# Patient Record
Sex: FEMALE | Race: WHITE | NOT HISPANIC OR LATINO
[De-identification: names, ages, dates, MRNs, and addresses within clinical notes are randomized per-mention and may not be internally consistent; named-entity substitution may affect disease eponyms.]

---

## 2020-05-04 ENCOUNTER — TRANSCRIPTION ENCOUNTER (OUTPATIENT)
Age: 85
End: 2020-05-04

## 2021-01-01 ENCOUNTER — TRANSCRIPTION ENCOUNTER (OUTPATIENT)
Age: 86
End: 2021-01-01

## 2021-01-01 ENCOUNTER — NON-APPOINTMENT (OUTPATIENT)
Age: 86
End: 2021-01-01

## 2021-01-01 ENCOUNTER — APPOINTMENT (OUTPATIENT)
Dept: UROLOGY | Facility: CLINIC | Age: 86
End: 2021-01-01
Payer: MEDICARE

## 2021-01-01 VITALS
BODY MASS INDEX: 23.6 KG/M2 | WEIGHT: 125 LBS | SYSTOLIC BLOOD PRESSURE: 133 MMHG | DIASTOLIC BLOOD PRESSURE: 81 MMHG | RESPIRATION RATE: 13 BRPM | HEIGHT: 61 IN | HEART RATE: 88 BPM

## 2021-01-01 DIAGNOSIS — Z86.79 PERSONAL HISTORY OF OTHER DISEASES OF THE CIRCULATORY SYSTEM: ICD-10-CM

## 2021-01-01 DIAGNOSIS — N32.81 OVERACTIVE BLADDER: ICD-10-CM

## 2021-01-01 LAB
APPEARANCE: ABNORMAL
BACTERIA UR CULT: NORMAL
BACTERIA: ABNORMAL
BILIRUBIN URINE: ABNORMAL
BLOOD URINE: ABNORMAL
COLOR: ABNORMAL
GLUCOSE QUALITATIVE U: NEGATIVE
HYALINE CASTS: 0 /LPF
KETONES URINE: NORMAL
LEUKOCYTE ESTERASE URINE: ABNORMAL
MICROSCOPIC-UA: NORMAL
NITRITE URINE: POSITIVE
PH URINE: 7.5
PROTEIN URINE: ABNORMAL
RED BLOOD CELLS URINE: >720 /HPF
SPECIFIC GRAVITY URINE: 1.02
SQUAMOUS EPITHELIAL CELLS: 0 /HPF
UROBILINOGEN URINE: NORMAL
WHITE BLOOD CELLS URINE: >720 /HPF

## 2021-01-01 PROCEDURE — 99204 OFFICE O/P NEW MOD 45 MIN: CPT

## 2021-01-01 RX ORDER — PROPRANOLOL HYDROCHLORIDE 80 MG/1
TABLET ORAL
Refills: 0 | Status: ACTIVE | COMMUNITY

## 2021-01-01 RX ORDER — OXYBUTYNIN CHLORIDE 5 MG/1
5 TABLET ORAL
Refills: 0 | Status: DISCONTINUED | COMMUNITY

## 2021-01-25 ENCOUNTER — TRANSCRIPTION ENCOUNTER (OUTPATIENT)
Age: 86
End: 2021-01-25

## 2021-02-10 ENCOUNTER — TRANSCRIPTION ENCOUNTER (OUTPATIENT)
Age: 86
End: 2021-02-10

## 2021-12-20 PROBLEM — Z00.00 ENCOUNTER FOR PREVENTIVE HEALTH EXAMINATION: Status: ACTIVE | Noted: 2021-01-01

## 2021-12-22 PROBLEM — N32.81 OVERACTIVE BLADDER: Status: RESOLVED | Noted: 2021-01-01 | Resolved: 2021-01-01

## 2021-12-22 PROBLEM — Z86.79 HISTORY OF HYPERTENSION: Status: RESOLVED | Noted: 2021-01-01 | Resolved: 2021-01-01

## 2021-12-22 NOTE — HISTORY OF PRESENT ILLNESS
[Currently Experiencing ___] :  [unfilled] [Urinary Urgency] : urinary urgency [Urinary Frequency] : urinary frequency [Nocturia] : nocturia [Hematuria - Gross] : gross hematuria [None] : None [FreeTextEntry1] : Ms. Gutierrez is a very pleasant 89 year old woman here today for evaluation of hematuria and possible UTI.\par She reports that for the past 2 years she has had "cloudy urine" and sometimes pink tinged.\par She reports that additionally that she has increased urgency and frequency.\par She also reports nocturnal enuresis if she is not able to stay awake.\par Records show Urgent Care visit for UTIs 5 times this year.\par She denies any dysuria or pain.\par She was given oxybutynin by Dr. Mariano Rojas, urology, with [facility], but reports it was not helpful.\par Denies personal or family history of kidney stones.\par Denies familial history of urological cancers.\par Denies history of smoking.\par \par

## 2021-12-22 NOTE — ASSESSMENT
[FreeTextEntry1] : Ms. Gutierrez is a very pleasant 89 year old woman here today for evaluation of gross hematuria and possible UTIs.\par She reports that for the past 2 years she has had "cloudy urine" and sometimes pink tinged.\par She reports that additionally that she has increased urgency and frequency.\par She also reports nocturnal enuresis if she is not able to stay awake.\par Records show Urgent Care visit for UTIs 5 times this year.\par She denies any dysuria or pain.\par She was given oxybutynin by Dr. Mariano Rojas, urology, with [facility], but reports it was not helpful.\par Denies personal or family history of kidney stones.\par Denies familial history of urological cancers.\par Denies history of smoking.\par Stop Ditropan as this has been ineffective\par Urine culture\par Urinalysis deferred as patient did not give a sufficient sample for urinalysis.  We will check urinalysis at next visit\par Start Bactrim BID for 7 days given concern for urinary tract infection\par -urine cytology deferred due to insufficient sample at this time.  We will check this at her next visit\par -CT Urogram and renal ultrasound.  I recommended that she undergo upper tract imaging with a CT scan.  She does not wish to do this.  I therefore recommended an ultrasound, however she reports that she does not feel that this is necessary.  We discussed the risk of upper tract malignancy with gross hematuria and recurrent urinary tract infections and after thorough discussion of the risks and benefits of both the exam and undiagnosed malignancy and other etiologies of hematuria she wishes to forego all imaging\par -cystoscopy.  Patient wishes to forego this as well despite recommendation to do so and risks and benefits of both exam as well as risk for malignancy\par -We discussed AUA guidelines regarding risk stratification for hematuria\par -Extensive discussion of the potential etiologies of hematuria, as well as the recommendation to complete full work up for evaluation of cancer or other  sources of hematuria, the patient wishes to defer this at this time\par Follow-up in 2 weeks\par \par \par \par \par

## 2022-01-01 ENCOUNTER — APPOINTMENT (OUTPATIENT)
Dept: UROLOGY | Facility: CLINIC | Age: 87
End: 2022-01-01
Payer: MEDICARE

## 2022-01-01 ENCOUNTER — TRANSCRIPTION ENCOUNTER (OUTPATIENT)
Age: 87
End: 2022-01-01

## 2022-01-01 ENCOUNTER — INPATIENT (INPATIENT)
Facility: HOSPITAL | Age: 87
LOS: 13 days | Discharge: NURSING, REHAB OR LONG-TERM CARE | DRG: 481 | End: 2022-02-16
Attending: ORTHOPAEDIC SURGERY | Admitting: ORTHOPAEDIC SURGERY
Payer: MEDICARE

## 2022-01-01 ENCOUNTER — INPATIENT (INPATIENT)
Facility: HOSPITAL | Age: 87
LOS: 1 days | Discharge: HOSPICE | DRG: 682 | End: 2022-03-17
Attending: STUDENT IN AN ORGANIZED HEALTH CARE EDUCATION/TRAINING PROGRAM | Admitting: STUDENT IN AN ORGANIZED HEALTH CARE EDUCATION/TRAINING PROGRAM
Payer: MEDICARE

## 2022-01-01 ENCOUNTER — INPATIENT (INPATIENT)
Facility: HOSPITAL | Age: 87
LOS: 1 days | DRG: 951 | End: 2022-03-19
Attending: FAMILY MEDICINE | Admitting: FAMILY MEDICINE
Payer: OTHER MISCELLANEOUS

## 2022-01-01 ENCOUNTER — NON-APPOINTMENT (OUTPATIENT)
Age: 87
End: 2022-01-01

## 2022-01-01 ENCOUNTER — APPOINTMENT (OUTPATIENT)
Dept: UROLOGY | Facility: CLINIC | Age: 87
End: 2022-01-01

## 2022-01-01 VITALS
OXYGEN SATURATION: 92 % | DIASTOLIC BLOOD PRESSURE: 59 MMHG | TEMPERATURE: 98 F | SYSTOLIC BLOOD PRESSURE: 95 MMHG | RESPIRATION RATE: 15 BRPM | HEART RATE: 102 BPM

## 2022-01-01 VITALS
HEART RATE: 89 BPM | OXYGEN SATURATION: 90 % | HEIGHT: 62 IN | RESPIRATION RATE: 18 BRPM | DIASTOLIC BLOOD PRESSURE: 76 MMHG | SYSTOLIC BLOOD PRESSURE: 116 MMHG | TEMPERATURE: 98 F

## 2022-01-01 VITALS
OXYGEN SATURATION: 95 % | HEART RATE: 89 BPM | SYSTOLIC BLOOD PRESSURE: 128 MMHG | RESPIRATION RATE: 18 BRPM | TEMPERATURE: 99 F | DIASTOLIC BLOOD PRESSURE: 67 MMHG

## 2022-01-01 VITALS
OXYGEN SATURATION: 97 % | HEART RATE: 74 BPM | DIASTOLIC BLOOD PRESSURE: 70 MMHG | RESPIRATION RATE: 17 BRPM | SYSTOLIC BLOOD PRESSURE: 131 MMHG | TEMPERATURE: 98 F

## 2022-01-01 VITALS
TEMPERATURE: 98 F | OXYGEN SATURATION: 100 % | DIASTOLIC BLOOD PRESSURE: 43 MMHG | SYSTOLIC BLOOD PRESSURE: 100 MMHG | RESPIRATION RATE: 18 BRPM | HEART RATE: 89 BPM

## 2022-01-01 VITALS — TEMPERATURE: 92.8 F

## 2022-01-01 VITALS — WEIGHT: 130.29 LBS

## 2022-01-01 DIAGNOSIS — Z02.9 ENCOUNTER FOR ADMINISTRATIVE EXAMINATIONS, UNSPECIFIED: ICD-10-CM

## 2022-01-01 DIAGNOSIS — D72.829 ELEVATED WHITE BLOOD CELL COUNT, UNSPECIFIED: ICD-10-CM

## 2022-01-01 DIAGNOSIS — Z29.9 ENCOUNTER FOR PROPHYLACTIC MEASURES, UNSPECIFIED: ICD-10-CM

## 2022-01-01 DIAGNOSIS — N39.0 URINARY TRACT INFECTION, SITE NOT SPECIFIED: ICD-10-CM

## 2022-01-01 DIAGNOSIS — I10 ESSENTIAL (PRIMARY) HYPERTENSION: ICD-10-CM

## 2022-01-01 DIAGNOSIS — E87.5 HYPERKALEMIA: ICD-10-CM

## 2022-01-01 DIAGNOSIS — K11.7 DISTURBANCES OF SALIVARY SECRETION: ICD-10-CM

## 2022-01-01 DIAGNOSIS — J96.91 RESPIRATORY FAILURE, UNSPECIFIED WITH HYPOXIA: ICD-10-CM

## 2022-01-01 DIAGNOSIS — C50.919 MALIGNANT NEOPLASM OF UNSPECIFIED SITE OF UNSPECIFIED FEMALE BREAST: ICD-10-CM

## 2022-01-01 DIAGNOSIS — R06.00 DYSPNEA, UNSPECIFIED: ICD-10-CM

## 2022-01-01 DIAGNOSIS — N17.9 ACUTE KIDNEY FAILURE, UNSPECIFIED: ICD-10-CM

## 2022-01-01 DIAGNOSIS — Z51.5 ENCOUNTER FOR PALLIATIVE CARE: ICD-10-CM

## 2022-01-01 DIAGNOSIS — R35.0 FREQUENCY OF MICTURITION: ICD-10-CM

## 2022-01-01 DIAGNOSIS — N18.9 CHRONIC KIDNEY DISEASE, UNSPECIFIED: ICD-10-CM

## 2022-01-01 DIAGNOSIS — J18.9 PNEUMONIA, UNSPECIFIED ORGANISM: ICD-10-CM

## 2022-01-01 DIAGNOSIS — R53.81 OTHER MALAISE: ICD-10-CM

## 2022-01-01 DIAGNOSIS — R45.1 RESTLESSNESS AND AGITATION: ICD-10-CM

## 2022-01-01 DIAGNOSIS — S72.009A FRACTURE OF UNSPECIFIED PART OF NECK OF UNSPECIFIED FEMUR, INITIAL ENCOUNTER FOR CLOSED FRACTURE: ICD-10-CM

## 2022-01-01 DIAGNOSIS — R31.9 HEMATURIA, UNSPECIFIED: ICD-10-CM

## 2022-01-01 DIAGNOSIS — N32.89 OTHER SPECIFIED DISORDERS OF BLADDER: ICD-10-CM

## 2022-01-01 DIAGNOSIS — R93.89 ABNORMAL FINDINGS ON DIAGNOSTIC IMAGING OF OTHER SPECIFIED BODY STRUCTURES: ICD-10-CM

## 2022-01-01 DIAGNOSIS — E88.3 TUMOR LYSIS SYNDROME: ICD-10-CM

## 2022-01-01 DIAGNOSIS — R19.00 INTRA-ABDOMINAL AND PELVIC SWELLING, MASS AND LUMP, UNSPECIFIED SITE: ICD-10-CM

## 2022-01-01 DIAGNOSIS — S72.002A FRACTURE OF UNSPECIFIED PART OF NECK OF LEFT FEMUR, INITIAL ENCOUNTER FOR CLOSED FRACTURE: ICD-10-CM

## 2022-01-01 DIAGNOSIS — R31.0 GROSS HEMATURIA: ICD-10-CM

## 2022-01-01 DIAGNOSIS — S42.402A UNSPECIFIED FRACTURE OF LOWER END OF LEFT HUMERUS, INITIAL ENCOUNTER FOR CLOSED FRACTURE: ICD-10-CM

## 2022-01-01 DIAGNOSIS — M25.552 PAIN IN LEFT HIP: ICD-10-CM

## 2022-01-01 DIAGNOSIS — E43 UNSPECIFIED SEVERE PROTEIN-CALORIE MALNUTRITION: ICD-10-CM

## 2022-01-01 DIAGNOSIS — K56.7 ILEUS, UNSPECIFIED: ICD-10-CM

## 2022-01-01 LAB
% ALBUMIN: 51.9 % — SIGNIFICANT CHANGE UP
% ALPHA 1: 10 % — SIGNIFICANT CHANGE UP
% ALPHA 2: 15.2 % — SIGNIFICANT CHANGE UP
% BETA: 11 % — SIGNIFICANT CHANGE UP
% GAMMA: 11.9 % — SIGNIFICANT CHANGE UP
A1C WITH ESTIMATED AVERAGE GLUCOSE RESULT: 5.4 % — SIGNIFICANT CHANGE UP (ref 4–5.6)
ABO RH CONFIRMATION: SIGNIFICANT CHANGE UP
ALBUMIN SERPL ELPH-MCNC: 1.5 G/DL — LOW (ref 3.5–5)
ALBUMIN SERPL ELPH-MCNC: 1.7 G/DL — LOW (ref 3.5–5)
ALBUMIN SERPL ELPH-MCNC: 1.7 G/DL — LOW (ref 3.5–5)
ALBUMIN SERPL ELPH-MCNC: 1.8 G/DL — LOW (ref 3.5–5)
ALBUMIN SERPL ELPH-MCNC: 1.9 G/DL — LOW (ref 3.5–5)
ALBUMIN SERPL ELPH-MCNC: 2.1 G/DL — LOW (ref 3.5–5)
ALBUMIN SERPL ELPH-MCNC: 2.1 G/DL — LOW (ref 3.5–5)
ALBUMIN SERPL ELPH-MCNC: 2.3 G/DL — LOW (ref 3.6–5.5)
ALBUMIN SERPL ELPH-MCNC: 2.6 G/DL — LOW (ref 3.5–5)
ALBUMIN/GLOB SERPL ELPH: 1 RATIO — SIGNIFICANT CHANGE UP
ALP SERPL-CCNC: 103 U/L — SIGNIFICANT CHANGE UP (ref 40–120)
ALP SERPL-CCNC: 127 U/L — HIGH (ref 40–120)
ALP SERPL-CCNC: 129 U/L — HIGH (ref 40–120)
ALP SERPL-CCNC: 136 U/L — HIGH (ref 40–120)
ALP SERPL-CCNC: 58 U/L — SIGNIFICANT CHANGE UP (ref 40–120)
ALP SERPL-CCNC: 58 U/L — SIGNIFICANT CHANGE UP (ref 40–120)
ALP SERPL-CCNC: 60 U/L — SIGNIFICANT CHANGE UP (ref 40–120)
ALP SERPL-CCNC: 63 U/L — SIGNIFICANT CHANGE UP (ref 40–120)
ALP SERPL-CCNC: 65 U/L — SIGNIFICANT CHANGE UP (ref 40–120)
ALP SERPL-CCNC: 65 U/L — SIGNIFICANT CHANGE UP (ref 40–120)
ALP SERPL-CCNC: 78 U/L — SIGNIFICANT CHANGE UP (ref 40–120)
ALP SERPL-CCNC: 79 U/L — SIGNIFICANT CHANGE UP (ref 40–120)
ALP SERPL-CCNC: 82 U/L — SIGNIFICANT CHANGE UP (ref 40–120)
ALP SERPL-CCNC: 93 U/L — SIGNIFICANT CHANGE UP (ref 40–120)
ALPHA1 GLOB SERPL ELPH-MCNC: 0.4 G/DL — SIGNIFICANT CHANGE UP (ref 0.1–0.4)
ALPHA2 GLOB SERPL ELPH-MCNC: 0.7 G/DL — SIGNIFICANT CHANGE UP (ref 0.5–1)
ALT FLD-CCNC: 10 U/L DA — SIGNIFICANT CHANGE UP (ref 10–60)
ALT FLD-CCNC: 10 U/L DA — SIGNIFICANT CHANGE UP (ref 10–60)
ALT FLD-CCNC: 11 U/L DA — SIGNIFICANT CHANGE UP (ref 10–60)
ALT FLD-CCNC: 11 U/L DA — SIGNIFICANT CHANGE UP (ref 10–60)
ALT FLD-CCNC: 12 U/L DA — SIGNIFICANT CHANGE UP (ref 10–60)
ALT FLD-CCNC: 30 U/L DA — SIGNIFICANT CHANGE UP (ref 10–60)
ALT FLD-CCNC: 31 U/L DA — SIGNIFICANT CHANGE UP (ref 10–60)
ALT FLD-CCNC: 33 U/L DA — SIGNIFICANT CHANGE UP (ref 10–60)
ALT FLD-CCNC: 36 U/L DA — SIGNIFICANT CHANGE UP (ref 10–60)
ALT FLD-CCNC: 36 U/L DA — SIGNIFICANT CHANGE UP (ref 10–60)
ALT FLD-CCNC: 40 U/L DA — SIGNIFICANT CHANGE UP (ref 10–60)
ALT FLD-CCNC: 8 U/L DA — LOW (ref 10–60)
ALT FLD-CCNC: 9 U/L DA — LOW (ref 10–60)
ALT FLD-CCNC: 9 U/L DA — LOW (ref 10–60)
ANION GAP SERPL CALC-SCNC: 1 MMOL/L — LOW (ref 5–17)
ANION GAP SERPL CALC-SCNC: 11 MMOL/L — SIGNIFICANT CHANGE UP (ref 5–17)
ANION GAP SERPL CALC-SCNC: 12 MMOL/L — SIGNIFICANT CHANGE UP (ref 5–17)
ANION GAP SERPL CALC-SCNC: 13 MMOL/L — SIGNIFICANT CHANGE UP (ref 5–17)
ANION GAP SERPL CALC-SCNC: 13 MMOL/L — SIGNIFICANT CHANGE UP (ref 5–17)
ANION GAP SERPL CALC-SCNC: 15 MMOL/L — SIGNIFICANT CHANGE UP (ref 5–17)
ANION GAP SERPL CALC-SCNC: 5 MMOL/L — SIGNIFICANT CHANGE UP (ref 5–17)
ANION GAP SERPL CALC-SCNC: 5 MMOL/L — SIGNIFICANT CHANGE UP (ref 5–17)
ANION GAP SERPL CALC-SCNC: 6 MMOL/L — SIGNIFICANT CHANGE UP (ref 5–17)
ANION GAP SERPL CALC-SCNC: 7 MMOL/L — SIGNIFICANT CHANGE UP (ref 5–17)
ANISOCYTOSIS BLD QL: SLIGHT — SIGNIFICANT CHANGE UP
APPEARANCE UR: ABNORMAL
APPEARANCE: ABNORMAL
APTT BLD: 30.8 SEC — SIGNIFICANT CHANGE UP (ref 27.5–35.5)
APTT BLD: 31 SEC — SIGNIFICANT CHANGE UP (ref 27.5–35.5)
APTT BLD: 31 SEC — SIGNIFICANT CHANGE UP (ref 27.5–35.5)
AST SERPL-CCNC: 10 U/L — SIGNIFICANT CHANGE UP (ref 10–40)
AST SERPL-CCNC: 10 U/L — SIGNIFICANT CHANGE UP (ref 10–40)
AST SERPL-CCNC: 12 U/L — SIGNIFICANT CHANGE UP (ref 10–40)
AST SERPL-CCNC: 15 U/L — SIGNIFICANT CHANGE UP (ref 10–40)
AST SERPL-CCNC: 18 U/L — SIGNIFICANT CHANGE UP (ref 10–40)
AST SERPL-CCNC: 22 U/L — SIGNIFICANT CHANGE UP (ref 10–40)
AST SERPL-CCNC: 23 U/L — SIGNIFICANT CHANGE UP (ref 10–40)
AST SERPL-CCNC: 36 U/L — SIGNIFICANT CHANGE UP (ref 10–40)
AST SERPL-CCNC: 37 U/L — SIGNIFICANT CHANGE UP (ref 10–40)
AST SERPL-CCNC: 43 U/L — HIGH (ref 10–40)
AST SERPL-CCNC: 60 U/L — HIGH (ref 10–40)
AST SERPL-CCNC: 68 U/L — HIGH (ref 10–40)
B-GLOBULIN SERPL ELPH-MCNC: 0.5 G/DL — SIGNIFICANT CHANGE UP (ref 0.5–1)
BACTERIA # UR AUTO: ABNORMAL /HPF
BACTERIA # UR AUTO: ABNORMAL /HPF
BACTERIA # UR AUTO: NEGATIVE /HPF — SIGNIFICANT CHANGE UP
BACTERIA UR CULT: ABNORMAL
BACTERIA: ABNORMAL
BASOPHILS # BLD AUTO: 0 K/UL — SIGNIFICANT CHANGE UP (ref 0–0.2)
BASOPHILS # BLD AUTO: 0 K/UL — SIGNIFICANT CHANGE UP (ref 0–0.2)
BASOPHILS # BLD AUTO: 0.01 K/UL — SIGNIFICANT CHANGE UP (ref 0–0.2)
BASOPHILS # BLD AUTO: 0.06 K/UL — SIGNIFICANT CHANGE UP (ref 0–0.2)
BASOPHILS # BLD AUTO: 0.06 K/UL — SIGNIFICANT CHANGE UP (ref 0–0.2)
BASOPHILS # BLD AUTO: 0.1 K/UL — SIGNIFICANT CHANGE UP (ref 0–0.2)
BASOPHILS NFR BLD AUTO: 0 % — SIGNIFICANT CHANGE UP (ref 0–2)
BASOPHILS NFR BLD AUTO: 0.3 % — SIGNIFICANT CHANGE UP (ref 0–2)
BILIRUB DIRECT SERPL-MCNC: 0.1 MG/DL — SIGNIFICANT CHANGE UP (ref 0–0.3)
BILIRUB INDIRECT FLD-MCNC: 0.3 MG/DL — SIGNIFICANT CHANGE UP (ref 0.2–1)
BILIRUB SERPL-MCNC: 0.4 MG/DL — SIGNIFICANT CHANGE UP (ref 0.2–1.2)
BILIRUB SERPL-MCNC: 0.5 MG/DL — SIGNIFICANT CHANGE UP (ref 0.2–1.2)
BILIRUB SERPL-MCNC: 0.6 MG/DL — SIGNIFICANT CHANGE UP (ref 0.2–1.2)
BILIRUB SERPL-MCNC: 0.6 MG/DL — SIGNIFICANT CHANGE UP (ref 0.2–1.2)
BILIRUB SERPL-MCNC: 0.7 MG/DL — SIGNIFICANT CHANGE UP (ref 0.2–1.2)
BILIRUB SERPL-MCNC: 0.7 MG/DL — SIGNIFICANT CHANGE UP (ref 0.2–1.2)
BILIRUB SERPL-MCNC: 0.8 MG/DL — SIGNIFICANT CHANGE UP (ref 0.2–1.2)
BILIRUB UR-MCNC: NEGATIVE — SIGNIFICANT CHANGE UP
BILIRUBIN URINE: NEGATIVE
BLD GP AB SCN SERPL QL: SIGNIFICANT CHANGE UP
BLD GP AB SCN SERPL QL: SIGNIFICANT CHANGE UP
BLOOD URINE: ABNORMAL
BUN SERPL-MCNC: 115 MG/DL — HIGH (ref 7–18)
BUN SERPL-MCNC: 124 MG/DL — HIGH (ref 7–18)
BUN SERPL-MCNC: 18 MG/DL — SIGNIFICANT CHANGE UP (ref 7–18)
BUN SERPL-MCNC: 22 MG/DL — HIGH (ref 7–18)
BUN SERPL-MCNC: 24 MG/DL — HIGH (ref 7–18)
BUN SERPL-MCNC: 31 MG/DL — HIGH (ref 7–18)
BUN SERPL-MCNC: 32 MG/DL — HIGH (ref 7–18)
BUN SERPL-MCNC: 32 MG/DL — HIGH (ref 7–18)
BUN SERPL-MCNC: 33 MG/DL — HIGH (ref 7–18)
BUN SERPL-MCNC: 35 MG/DL — HIGH (ref 7–18)
BUN SERPL-MCNC: 36 MG/DL — HIGH (ref 7–18)
BUN SERPL-MCNC: 39 MG/DL — HIGH (ref 7–18)
BUN SERPL-MCNC: 42 MG/DL — HIGH (ref 7–18)
BUN SERPL-MCNC: 42 MG/DL — HIGH (ref 7–18)
BUN SERPL-MCNC: 43 MG/DL — HIGH (ref 7–18)
BUN SERPL-MCNC: 44 MG/DL — HIGH (ref 7–18)
BUN SERPL-MCNC: 47 MG/DL — HIGH (ref 7–18)
BUN SERPL-MCNC: 93 MG/DL — HIGH (ref 7–18)
BUN SERPL-MCNC: 96 MG/DL — HIGH (ref 7–18)
BUN SERPL-MCNC: 98 MG/DL — HIGH (ref 7–18)
CALCIUM SERPL-MCNC: 7.3 MG/DL — LOW (ref 8.4–10.5)
CALCIUM SERPL-MCNC: 7.6 MG/DL — LOW (ref 8.4–10.5)
CALCIUM SERPL-MCNC: 7.8 MG/DL — LOW (ref 8.4–10.5)
CALCIUM SERPL-MCNC: 7.9 MG/DL — LOW (ref 8.4–10.5)
CALCIUM SERPL-MCNC: 8 MG/DL — LOW (ref 8.4–10.5)
CALCIUM SERPL-MCNC: 8.1 MG/DL — LOW (ref 8.4–10.5)
CALCIUM SERPL-MCNC: 8.3 MG/DL — LOW (ref 8.4–10.5)
CALCIUM SERPL-MCNC: 8.3 MG/DL — LOW (ref 8.4–10.5)
CALCIUM SERPL-MCNC: 8.4 MG/DL — SIGNIFICANT CHANGE UP (ref 8.4–10.5)
CALCIUM SERPL-MCNC: 8.6 MG/DL — SIGNIFICANT CHANGE UP (ref 8.4–10.5)
CALCIUM SERPL-MCNC: 8.9 MG/DL — SIGNIFICANT CHANGE UP (ref 8.4–10.5)
CALCIUM SERPL-MCNC: 9.1 MG/DL — SIGNIFICANT CHANGE UP (ref 8.4–10.5)
CHLORIDE SERPL-SCNC: 100 MMOL/L — SIGNIFICANT CHANGE UP (ref 96–108)
CHLORIDE SERPL-SCNC: 100 MMOL/L — SIGNIFICANT CHANGE UP (ref 96–108)
CHLORIDE SERPL-SCNC: 104 MMOL/L — SIGNIFICANT CHANGE UP (ref 96–108)
CHLORIDE SERPL-SCNC: 105 MMOL/L — SIGNIFICANT CHANGE UP (ref 96–108)
CHLORIDE SERPL-SCNC: 106 MMOL/L — SIGNIFICANT CHANGE UP (ref 96–108)
CHLORIDE SERPL-SCNC: 106 MMOL/L — SIGNIFICANT CHANGE UP (ref 96–108)
CHLORIDE SERPL-SCNC: 107 MMOL/L — SIGNIFICANT CHANGE UP (ref 96–108)
CHLORIDE SERPL-SCNC: 108 MMOL/L — SIGNIFICANT CHANGE UP (ref 96–108)
CHLORIDE SERPL-SCNC: 109 MMOL/L — HIGH (ref 96–108)
CHLORIDE SERPL-SCNC: 109 MMOL/L — HIGH (ref 96–108)
CHLORIDE SERPL-SCNC: 110 MMOL/L — HIGH (ref 96–108)
CHLORIDE SERPL-SCNC: 111 MMOL/L — HIGH (ref 96–108)
CHLORIDE SERPL-SCNC: 112 MMOL/L — HIGH (ref 96–108)
CHLORIDE SERPL-SCNC: 112 MMOL/L — HIGH (ref 96–108)
CHLORIDE SERPL-SCNC: 115 MMOL/L — HIGH (ref 96–108)
CHOLEST SERPL-MCNC: 100 MG/DL — SIGNIFICANT CHANGE UP
CO2 SERPL-SCNC: 13 MMOL/L — LOW (ref 22–31)
CO2 SERPL-SCNC: 14 MMOL/L — LOW (ref 22–31)
CO2 SERPL-SCNC: 15 MMOL/L — LOW (ref 22–31)
CO2 SERPL-SCNC: 15 MMOL/L — LOW (ref 22–31)
CO2 SERPL-SCNC: 17 MMOL/L — LOW (ref 22–31)
CO2 SERPL-SCNC: 20 MMOL/L — LOW (ref 22–31)
CO2 SERPL-SCNC: 21 MMOL/L — LOW (ref 22–31)
CO2 SERPL-SCNC: 21 MMOL/L — LOW (ref 22–31)
CO2 SERPL-SCNC: 22 MMOL/L — SIGNIFICANT CHANGE UP (ref 22–31)
CO2 SERPL-SCNC: 23 MMOL/L — SIGNIFICANT CHANGE UP (ref 22–31)
CO2 SERPL-SCNC: 24 MMOL/L — SIGNIFICANT CHANGE UP (ref 22–31)
CO2 SERPL-SCNC: 25 MMOL/L — SIGNIFICANT CHANGE UP (ref 22–31)
CO2 SERPL-SCNC: 26 MMOL/L — SIGNIFICANT CHANGE UP (ref 22–31)
CO2 SERPL-SCNC: 26 MMOL/L — SIGNIFICANT CHANGE UP (ref 22–31)
COLOR SPEC: ABNORMAL
COLOR: ABNORMAL
CREAT ?TM UR-MCNC: 30 MG/DL — SIGNIFICANT CHANGE UP
CREAT SERPL-MCNC: 0.53 MG/DL — SIGNIFICANT CHANGE UP (ref 0.5–1.3)
CREAT SERPL-MCNC: 0.6 MG/DL — SIGNIFICANT CHANGE UP (ref 0.5–1.3)
CREAT SERPL-MCNC: 0.78 MG/DL — SIGNIFICANT CHANGE UP (ref 0.5–1.3)
CREAT SERPL-MCNC: 0.96 MG/DL — SIGNIFICANT CHANGE UP (ref 0.5–1.3)
CREAT SERPL-MCNC: 1.03 MG/DL — SIGNIFICANT CHANGE UP (ref 0.5–1.3)
CREAT SERPL-MCNC: 1.05 MG/DL — SIGNIFICANT CHANGE UP (ref 0.5–1.3)
CREAT SERPL-MCNC: 1.06 MG/DL — SIGNIFICANT CHANGE UP (ref 0.5–1.3)
CREAT SERPL-MCNC: 1.07 MG/DL — SIGNIFICANT CHANGE UP (ref 0.5–1.3)
CREAT SERPL-MCNC: 1.09 MG/DL — SIGNIFICANT CHANGE UP (ref 0.5–1.3)
CREAT SERPL-MCNC: 1.23 MG/DL — SIGNIFICANT CHANGE UP (ref 0.5–1.3)
CREAT SERPL-MCNC: 1.27 MG/DL — SIGNIFICANT CHANGE UP (ref 0.5–1.3)
CREAT SERPL-MCNC: 1.35 MG/DL — HIGH (ref 0.5–1.3)
CREAT SERPL-MCNC: 1.48 MG/DL — HIGH (ref 0.5–1.3)
CREAT SERPL-MCNC: 1.61 MG/DL — HIGH (ref 0.5–1.3)
CREAT SERPL-MCNC: 2.84 MG/DL — HIGH (ref 0.5–1.3)
CREAT SERPL-MCNC: 5.1 MG/DL — HIGH (ref 0.5–1.3)
CREAT SERPL-MCNC: 5.21 MG/DL — HIGH (ref 0.5–1.3)
CREAT SERPL-MCNC: 5.24 MG/DL — HIGH (ref 0.5–1.3)
CREAT SERPL-MCNC: 5.73 MG/DL — HIGH (ref 0.5–1.3)
CREAT SERPL-MCNC: 6.36 MG/DL — HIGH (ref 0.5–1.3)
CULTURE RESULTS: SIGNIFICANT CHANGE UP
D DIMER BLD IA.RAPID-MCNC: 2936 NG/ML DDU — HIGH
DIFF PNL FLD: ABNORMAL
EGFR: 6 ML/MIN/1.73M2 — LOW
EGFR: 7 ML/MIN/1.73M2 — LOW
EOSINOPHIL # BLD AUTO: 0 K/UL — SIGNIFICANT CHANGE UP (ref 0–0.5)
EOSINOPHIL # BLD AUTO: 0.01 K/UL — SIGNIFICANT CHANGE UP (ref 0–0.5)
EOSINOPHIL # BLD AUTO: 0.02 K/UL — SIGNIFICANT CHANGE UP (ref 0–0.5)
EOSINOPHIL # BLD AUTO: 0.05 K/UL — SIGNIFICANT CHANGE UP (ref 0–0.5)
EOSINOPHIL NFR BLD AUTO: 0 % — SIGNIFICANT CHANGE UP (ref 0–6)
EOSINOPHIL NFR BLD AUTO: 0.3 % — SIGNIFICANT CHANGE UP (ref 0–6)
EPI CELLS # UR: NEGATIVE /HPF — SIGNIFICANT CHANGE UP
EPI CELLS # UR: NEGATIVE /HPF — SIGNIFICANT CHANGE UP
EPI CELLS # UR: SIGNIFICANT CHANGE UP /HPF
ESTIMATED AVERAGE GLUCOSE: 108 MG/DL — SIGNIFICANT CHANGE UP (ref 68–114)
FERRITIN SERPL-MCNC: 191 NG/ML — HIGH (ref 15–150)
GAMMA GLOBULIN: 0.5 G/DL — LOW (ref 0.6–1.6)
GLUCOSE BLDC GLUCOMTR-MCNC: 119 MG/DL — HIGH (ref 70–99)
GLUCOSE BLDC GLUCOMTR-MCNC: 144 MG/DL — HIGH (ref 70–99)
GLUCOSE QUALITATIVE U: NEGATIVE
GLUCOSE SERPL-MCNC: 100 MG/DL — HIGH (ref 70–99)
GLUCOSE SERPL-MCNC: 100 MG/DL — HIGH (ref 70–99)
GLUCOSE SERPL-MCNC: 105 MG/DL — HIGH (ref 70–99)
GLUCOSE SERPL-MCNC: 106 MG/DL — HIGH (ref 70–99)
GLUCOSE SERPL-MCNC: 107 MG/DL — HIGH (ref 70–99)
GLUCOSE SERPL-MCNC: 108 MG/DL — HIGH (ref 70–99)
GLUCOSE SERPL-MCNC: 110 MG/DL — HIGH (ref 70–99)
GLUCOSE SERPL-MCNC: 117 MG/DL — HIGH (ref 70–99)
GLUCOSE SERPL-MCNC: 130 MG/DL — HIGH (ref 70–99)
GLUCOSE SERPL-MCNC: 141 MG/DL — HIGH (ref 70–99)
GLUCOSE SERPL-MCNC: 161 MG/DL — HIGH (ref 70–99)
GLUCOSE SERPL-MCNC: 278 MG/DL — HIGH (ref 70–99)
GLUCOSE SERPL-MCNC: 80 MG/DL — SIGNIFICANT CHANGE UP (ref 70–99)
GLUCOSE SERPL-MCNC: 81 MG/DL — SIGNIFICANT CHANGE UP (ref 70–99)
GLUCOSE SERPL-MCNC: 87 MG/DL — SIGNIFICANT CHANGE UP (ref 70–99)
GLUCOSE SERPL-MCNC: 88 MG/DL — SIGNIFICANT CHANGE UP (ref 70–99)
GLUCOSE SERPL-MCNC: 90 MG/DL — SIGNIFICANT CHANGE UP (ref 70–99)
GLUCOSE SERPL-MCNC: 93 MG/DL — SIGNIFICANT CHANGE UP (ref 70–99)
GLUCOSE SERPL-MCNC: 96 MG/DL — SIGNIFICANT CHANGE UP (ref 70–99)
GLUCOSE SERPL-MCNC: 98 MG/DL — SIGNIFICANT CHANGE UP (ref 70–99)
GLUCOSE UR QL: NEGATIVE — SIGNIFICANT CHANGE UP
HCT VFR BLD CALC: 24.1 % — LOW (ref 34.5–45)
HCT VFR BLD CALC: 26 % — LOW (ref 34.5–45)
HCT VFR BLD CALC: 27.1 % — LOW (ref 34.5–45)
HCT VFR BLD CALC: 28.1 % — LOW (ref 34.5–45)
HCT VFR BLD CALC: 28.5 % — LOW (ref 34.5–45)
HCT VFR BLD CALC: 28.7 % — LOW (ref 34.5–45)
HCT VFR BLD CALC: 29.2 % — LOW (ref 34.5–45)
HCT VFR BLD CALC: 29.5 % — LOW (ref 34.5–45)
HCT VFR BLD CALC: 30 % — LOW (ref 34.5–45)
HCT VFR BLD CALC: 30.2 % — LOW (ref 34.5–45)
HCT VFR BLD CALC: 31.2 % — LOW (ref 34.5–45)
HCT VFR BLD CALC: 31.2 % — LOW (ref 34.5–45)
HCT VFR BLD CALC: 32.2 % — LOW (ref 34.5–45)
HCT VFR BLD CALC: 32.4 % — LOW (ref 34.5–45)
HCT VFR BLD CALC: 33.7 % — LOW (ref 34.5–45)
HCT VFR BLD CALC: 35.5 % — SIGNIFICANT CHANGE UP (ref 34.5–45)
HCT VFR BLD CALC: 36.1 % — SIGNIFICANT CHANGE UP (ref 34.5–45)
HDLC SERPL-MCNC: 48 MG/DL — LOW
HGB BLD-MCNC: 10 G/DL — LOW (ref 11.5–15.5)
HGB BLD-MCNC: 10 G/DL — LOW (ref 11.5–15.5)
HGB BLD-MCNC: 10.3 G/DL — LOW (ref 11.5–15.5)
HGB BLD-MCNC: 10.5 G/DL — LOW (ref 11.5–15.5)
HGB BLD-MCNC: 10.6 G/DL — LOW (ref 11.5–15.5)
HGB BLD-MCNC: 10.7 G/DL — LOW (ref 11.5–15.5)
HGB BLD-MCNC: 10.9 G/DL — LOW (ref 11.5–15.5)
HGB BLD-MCNC: 11.1 G/DL — LOW (ref 11.5–15.5)
HGB BLD-MCNC: 11.3 G/DL — LOW (ref 11.5–15.5)
HGB BLD-MCNC: 11.9 G/DL — SIGNIFICANT CHANGE UP (ref 11.5–15.5)
HGB BLD-MCNC: 7.9 G/DL — LOW (ref 11.5–15.5)
HGB BLD-MCNC: 8.7 G/DL — LOW (ref 11.5–15.5)
HGB BLD-MCNC: 8.9 G/DL — LOW (ref 11.5–15.5)
HGB BLD-MCNC: 9.2 G/DL — LOW (ref 11.5–15.5)
HGB BLD-MCNC: 9.3 G/DL — LOW (ref 11.5–15.5)
HGB BLD-MCNC: 9.3 G/DL — LOW (ref 11.5–15.5)
HGB BLD-MCNC: 9.6 G/DL — LOW (ref 11.5–15.5)
HGB BLD-MCNC: 9.7 G/DL — LOW (ref 11.5–15.5)
HGB BLD-MCNC: 9.9 G/DL — LOW (ref 11.5–15.5)
HYALINE CASTS: 0 /LPF
HYPOCHROMIA BLD QL: SLIGHT — SIGNIFICANT CHANGE UP
IMM GRANULOCYTES NFR BLD AUTO: 0.7 % — SIGNIFICANT CHANGE UP (ref 0–1.5)
IMM GRANULOCYTES NFR BLD AUTO: 0.9 % — SIGNIFICANT CHANGE UP (ref 0–1.5)
IMM GRANULOCYTES NFR BLD AUTO: 2.6 % — HIGH (ref 0–1.5)
IMM GRANULOCYTES NFR BLD AUTO: 2.9 % — HIGH (ref 0–1.5)
INR BLD: 1.02 RATIO — SIGNIFICANT CHANGE UP (ref 0.88–1.16)
INR BLD: 1.04 RATIO — SIGNIFICANT CHANGE UP (ref 0.88–1.16)
INR BLD: 1.04 RATIO — SIGNIFICANT CHANGE UP (ref 0.88–1.16)
INR BLD: 1.05 RATIO — SIGNIFICANT CHANGE UP (ref 0.88–1.16)
INTERPRETATION SERPL IFE-IMP: SIGNIFICANT CHANGE UP
IRON SATN MFR SERPL: 23 % — SIGNIFICANT CHANGE UP (ref 15–50)
IRON SATN MFR SERPL: 44 UG/DL — SIGNIFICANT CHANGE UP (ref 40–150)
KETONES UR-MCNC: ABNORMAL
KETONES UR-MCNC: NEGATIVE — SIGNIFICANT CHANGE UP
KETONES URINE: NEGATIVE
LACTATE SERPL-SCNC: 2.4 MMOL/L — HIGH (ref 0.7–2)
LACTATE SERPL-SCNC: 2.5 MMOL/L — HIGH (ref 0.7–2)
LACTATE SERPL-SCNC: 2.9 MMOL/L — HIGH (ref 0.7–2)
LEUKOCYTE ESTERASE UR-ACNC: ABNORMAL
LEUKOCYTE ESTERASE URINE: ABNORMAL
LIDOCAIN IGE QN: 130 U/L — SIGNIFICANT CHANGE UP (ref 73–393)
LIDOCAIN IGE QN: 52 U/L — LOW (ref 73–393)
LIPID PNL WITH DIRECT LDL SERPL: 40 MG/DL — SIGNIFICANT CHANGE UP
LYMPHOCYTES # BLD AUTO: 0.32 K/UL — LOW (ref 1–3.3)
LYMPHOCYTES # BLD AUTO: 0.71 K/UL — LOW (ref 1–3.3)
LYMPHOCYTES # BLD AUTO: 1 % — LOW (ref 13–44)
LYMPHOCYTES # BLD AUTO: 1.17 K/UL — SIGNIFICANT CHANGE UP (ref 1–3.3)
LYMPHOCYTES # BLD AUTO: 1.23 K/UL — SIGNIFICANT CHANGE UP (ref 1–3.3)
LYMPHOCYTES # BLD AUTO: 1.3 K/UL — SIGNIFICANT CHANGE UP (ref 1–3.3)
LYMPHOCYTES # BLD AUTO: 1.34 K/UL — SIGNIFICANT CHANGE UP (ref 1–3.3)
LYMPHOCYTES # BLD AUTO: 1.8 % — LOW (ref 13–44)
LYMPHOCYTES # BLD AUTO: 2 % — LOW (ref 13–44)
LYMPHOCYTES # BLD AUTO: 2.5 % — LOW (ref 13–44)
LYMPHOCYTES # BLD AUTO: 5.6 % — LOW (ref 13–44)
LYMPHOCYTES # BLD AUTO: 6 % — LOW (ref 13–44)
MAGNESIUM SERPL-MCNC: 2.2 MG/DL — SIGNIFICANT CHANGE UP (ref 1.6–2.6)
MAGNESIUM SERPL-MCNC: 2.3 MG/DL — SIGNIFICANT CHANGE UP (ref 1.6–2.6)
MAGNESIUM SERPL-MCNC: 2.4 MG/DL — SIGNIFICANT CHANGE UP (ref 1.6–2.6)
MAGNESIUM SERPL-MCNC: 2.5 MG/DL — SIGNIFICANT CHANGE UP (ref 1.6–2.6)
MANUAL SMEAR VERIFICATION: SIGNIFICANT CHANGE UP
MCHC RBC-ENTMCNC: 29.7 PG — SIGNIFICANT CHANGE UP (ref 27–34)
MCHC RBC-ENTMCNC: 30.4 PG — SIGNIFICANT CHANGE UP (ref 27–34)
MCHC RBC-ENTMCNC: 30.7 PG — SIGNIFICANT CHANGE UP (ref 27–34)
MCHC RBC-ENTMCNC: 30.9 GM/DL — LOW (ref 32–36)
MCHC RBC-ENTMCNC: 30.9 PG — SIGNIFICANT CHANGE UP (ref 27–34)
MCHC RBC-ENTMCNC: 30.9 PG — SIGNIFICANT CHANGE UP (ref 27–34)
MCHC RBC-ENTMCNC: 31 PG — SIGNIFICANT CHANGE UP (ref 27–34)
MCHC RBC-ENTMCNC: 31 PG — SIGNIFICANT CHANGE UP (ref 27–34)
MCHC RBC-ENTMCNC: 31.1 PG — SIGNIFICANT CHANGE UP (ref 27–34)
MCHC RBC-ENTMCNC: 31.2 PG — SIGNIFICANT CHANGE UP (ref 27–34)
MCHC RBC-ENTMCNC: 31.3 PG — SIGNIFICANT CHANGE UP (ref 27–34)
MCHC RBC-ENTMCNC: 31.4 PG — SIGNIFICANT CHANGE UP (ref 27–34)
MCHC RBC-ENTMCNC: 31.6 PG — SIGNIFICANT CHANGE UP (ref 27–34)
MCHC RBC-ENTMCNC: 31.8 GM/DL — LOW (ref 32–36)
MCHC RBC-ENTMCNC: 31.8 PG — SIGNIFICANT CHANGE UP (ref 27–34)
MCHC RBC-ENTMCNC: 31.9 PG — SIGNIFICANT CHANGE UP (ref 27–34)
MCHC RBC-ENTMCNC: 32.1 GM/DL — SIGNIFICANT CHANGE UP (ref 32–36)
MCHC RBC-ENTMCNC: 32.2 PG — SIGNIFICANT CHANGE UP (ref 27–34)
MCHC RBC-ENTMCNC: 32.3 GM/DL — SIGNIFICANT CHANGE UP (ref 32–36)
MCHC RBC-ENTMCNC: 32.4 GM/DL — SIGNIFICANT CHANGE UP (ref 32–36)
MCHC RBC-ENTMCNC: 32.4 GM/DL — SIGNIFICANT CHANGE UP (ref 32–36)
MCHC RBC-ENTMCNC: 32.5 GM/DL — SIGNIFICANT CHANGE UP (ref 32–36)
MCHC RBC-ENTMCNC: 32.8 GM/DL — SIGNIFICANT CHANGE UP (ref 32–36)
MCHC RBC-ENTMCNC: 32.8 GM/DL — SIGNIFICANT CHANGE UP (ref 32–36)
MCHC RBC-ENTMCNC: 32.9 GM/DL — SIGNIFICANT CHANGE UP (ref 32–36)
MCHC RBC-ENTMCNC: 32.9 GM/DL — SIGNIFICANT CHANGE UP (ref 32–36)
MCHC RBC-ENTMCNC: 33 GM/DL — SIGNIFICANT CHANGE UP (ref 32–36)
MCHC RBC-ENTMCNC: 33.1 GM/DL — SIGNIFICANT CHANGE UP (ref 32–36)
MCHC RBC-ENTMCNC: 33.3 GM/DL — SIGNIFICANT CHANGE UP (ref 32–36)
MCHC RBC-ENTMCNC: 33.5 GM/DL — SIGNIFICANT CHANGE UP (ref 32–36)
MCHC RBC-ENTMCNC: 33.9 GM/DL — SIGNIFICANT CHANGE UP (ref 32–36)
MCHC RBC-ENTMCNC: 34.9 GM/DL — SIGNIFICANT CHANGE UP (ref 32–36)
MCV RBC AUTO: 91.2 FL — SIGNIFICANT CHANGE UP (ref 80–100)
MCV RBC AUTO: 93.2 FL — SIGNIFICANT CHANGE UP (ref 80–100)
MCV RBC AUTO: 93.7 FL — SIGNIFICANT CHANGE UP (ref 80–100)
MCV RBC AUTO: 93.9 FL — SIGNIFICANT CHANGE UP (ref 80–100)
MCV RBC AUTO: 94.5 FL — SIGNIFICANT CHANGE UP (ref 80–100)
MCV RBC AUTO: 94.7 FL — SIGNIFICANT CHANGE UP (ref 80–100)
MCV RBC AUTO: 94.8 FL — SIGNIFICANT CHANGE UP (ref 80–100)
MCV RBC AUTO: 94.8 FL — SIGNIFICANT CHANGE UP (ref 80–100)
MCV RBC AUTO: 94.9 FL — SIGNIFICANT CHANGE UP (ref 80–100)
MCV RBC AUTO: 94.9 FL — SIGNIFICANT CHANGE UP (ref 80–100)
MCV RBC AUTO: 95.1 FL — SIGNIFICANT CHANGE UP (ref 80–100)
MCV RBC AUTO: 95.5 FL — SIGNIFICANT CHANGE UP (ref 80–100)
MCV RBC AUTO: 95.6 FL — SIGNIFICANT CHANGE UP (ref 80–100)
MCV RBC AUTO: 95.6 FL — SIGNIFICANT CHANGE UP (ref 80–100)
MCV RBC AUTO: 96.3 FL — SIGNIFICANT CHANGE UP (ref 80–100)
MCV RBC AUTO: 96.4 FL — SIGNIFICANT CHANGE UP (ref 80–100)
MCV RBC AUTO: 99.4 FL — SIGNIFICANT CHANGE UP (ref 80–100)
MICROCYTES BLD QL: SLIGHT — SIGNIFICANT CHANGE UP
MICROSCOPIC-UA: NORMAL
MONOCYTES # BLD AUTO: 1.19 K/UL — HIGH (ref 0–0.9)
MONOCYTES # BLD AUTO: 1.28 K/UL — HIGH (ref 0–0.9)
MONOCYTES # BLD AUTO: 1.7 K/UL — HIGH (ref 0–0.9)
MONOCYTES # BLD AUTO: 1.71 K/UL — HIGH (ref 0–0.9)
MONOCYTES # BLD AUTO: 1.78 K/UL — HIGH (ref 0–0.9)
MONOCYTES # BLD AUTO: 3.69 K/UL — HIGH (ref 0–0.9)
MONOCYTES NFR BLD AUTO: 3 % — SIGNIFICANT CHANGE UP (ref 2–14)
MONOCYTES NFR BLD AUTO: 3.1 % — SIGNIFICANT CHANGE UP (ref 2–14)
MONOCYTES NFR BLD AUTO: 4 % — SIGNIFICANT CHANGE UP (ref 2–14)
MONOCYTES NFR BLD AUTO: 6 % — SIGNIFICANT CHANGE UP (ref 2–14)
MONOCYTES NFR BLD AUTO: 7.3 % — SIGNIFICANT CHANGE UP (ref 2–14)
MONOCYTES NFR BLD AUTO: 9.2 % — SIGNIFICANT CHANGE UP (ref 2–14)
NEUTROPHILS # BLD AUTO: 16.14 K/UL — HIGH (ref 1.8–7.4)
NEUTROPHILS # BLD AUTO: 20.1 K/UL — HIGH (ref 1.8–7.4)
NEUTROPHILS # BLD AUTO: 30.49 K/UL — HIGH (ref 1.8–7.4)
NEUTROPHILS # BLD AUTO: 36.5 K/UL — HIGH (ref 1.8–7.4)
NEUTROPHILS # BLD AUTO: 50.01 K/UL — HIGH (ref 1.8–7.4)
NEUTROPHILS # BLD AUTO: 56.64 K/UL — HIGH (ref 1.8–7.4)
NEUTROPHILS NFR BLD AUTO: 83.3 % — HIGH (ref 43–77)
NEUTROPHILS NFR BLD AUTO: 86.1 % — HIGH (ref 43–77)
NEUTROPHILS NFR BLD AUTO: 91 % — HIGH (ref 43–77)
NEUTROPHILS NFR BLD AUTO: 91.5 % — HIGH (ref 43–77)
NEUTROPHILS NFR BLD AUTO: 92.3 % — HIGH (ref 43–77)
NEUTROPHILS NFR BLD AUTO: 93 % — HIGH (ref 43–77)
NEUTS BAND # BLD: 2 % — SIGNIFICANT CHANGE UP (ref 0–8)
NITRITE UR-MCNC: NEGATIVE — SIGNIFICANT CHANGE UP
NITRITE URINE: NEGATIVE
NON HDL CHOLESTEROL: 52 MG/DL — SIGNIFICANT CHANGE UP
NRBC # BLD: 0 /100 WBCS — SIGNIFICANT CHANGE UP (ref 0–0)
NRBC # BLD: 0 /100 — SIGNIFICANT CHANGE UP (ref 0–0)
NT-PROBNP SERPL-SCNC: 454 PG/ML — HIGH (ref 0–450)
NT-PROBNP SERPL-SCNC: 896 PG/ML — HIGH (ref 0–450)
OSMOLALITY UR: 404 MOS/KG — SIGNIFICANT CHANGE UP (ref 50–1200)
PH UR: 6.5 — SIGNIFICANT CHANGE UP (ref 5–8)
PH UR: 7 — SIGNIFICANT CHANGE UP (ref 5–8)
PH URINE: 8
PHOSPHATE SERPL-MCNC: 2.4 MG/DL — LOW (ref 2.5–4.5)
PHOSPHATE SERPL-MCNC: 3.1 MG/DL — SIGNIFICANT CHANGE UP (ref 2.5–4.5)
PHOSPHATE SERPL-MCNC: 5.2 MG/DL — HIGH (ref 2.5–4.5)
PHOSPHATE SERPL-MCNC: 5.5 MG/DL — HIGH (ref 2.5–4.5)
PLAT MORPH BLD: NORMAL — SIGNIFICANT CHANGE UP
PLATELET # BLD AUTO: 217 K/UL — SIGNIFICANT CHANGE UP (ref 150–400)
PLATELET # BLD AUTO: 224 K/UL — SIGNIFICANT CHANGE UP (ref 150–400)
PLATELET # BLD AUTO: 274 K/UL — SIGNIFICANT CHANGE UP (ref 150–400)
PLATELET # BLD AUTO: 275 K/UL — SIGNIFICANT CHANGE UP (ref 150–400)
PLATELET # BLD AUTO: 278 K/UL — SIGNIFICANT CHANGE UP (ref 150–400)
PLATELET # BLD AUTO: 302 K/UL — SIGNIFICANT CHANGE UP (ref 150–400)
PLATELET # BLD AUTO: 305 K/UL — SIGNIFICANT CHANGE UP (ref 150–400)
PLATELET # BLD AUTO: 316 K/UL — SIGNIFICANT CHANGE UP (ref 150–400)
PLATELET # BLD AUTO: 317 K/UL — SIGNIFICANT CHANGE UP (ref 150–400)
PLATELET # BLD AUTO: 321 K/UL — SIGNIFICANT CHANGE UP (ref 150–400)
PLATELET # BLD AUTO: 353 K/UL — SIGNIFICANT CHANGE UP (ref 150–400)
PLATELET # BLD AUTO: 355 K/UL — SIGNIFICANT CHANGE UP (ref 150–400)
PLATELET # BLD AUTO: 358 K/UL — SIGNIFICANT CHANGE UP (ref 150–400)
PLATELET # BLD AUTO: 367 K/UL — SIGNIFICANT CHANGE UP (ref 150–400)
PLATELET # BLD AUTO: 369 K/UL — SIGNIFICANT CHANGE UP (ref 150–400)
PLATELET # BLD AUTO: 456 K/UL — HIGH (ref 150–400)
PLATELET # BLD AUTO: 463 K/UL — HIGH (ref 150–400)
PLATELET # BLD AUTO: 471 K/UL — HIGH (ref 150–400)
PLATELET # BLD AUTO: 489 K/UL — HIGH (ref 150–400)
POIKILOCYTOSIS BLD QL AUTO: SLIGHT — SIGNIFICANT CHANGE UP
POLYCHROMASIA BLD QL SMEAR: SLIGHT — SIGNIFICANT CHANGE UP
POTASSIUM SERPL-MCNC: 3.7 MMOL/L — SIGNIFICANT CHANGE UP (ref 3.5–5.3)
POTASSIUM SERPL-MCNC: 3.9 MMOL/L — SIGNIFICANT CHANGE UP (ref 3.5–5.3)
POTASSIUM SERPL-MCNC: 4.1 MMOL/L — SIGNIFICANT CHANGE UP (ref 3.5–5.3)
POTASSIUM SERPL-MCNC: 4.2 MMOL/L — SIGNIFICANT CHANGE UP (ref 3.5–5.3)
POTASSIUM SERPL-MCNC: 4.3 MMOL/L — SIGNIFICANT CHANGE UP (ref 3.5–5.3)
POTASSIUM SERPL-MCNC: 4.3 MMOL/L — SIGNIFICANT CHANGE UP (ref 3.5–5.3)
POTASSIUM SERPL-MCNC: 4.5 MMOL/L — SIGNIFICANT CHANGE UP (ref 3.5–5.3)
POTASSIUM SERPL-MCNC: 4.7 MMOL/L — SIGNIFICANT CHANGE UP (ref 3.5–5.3)
POTASSIUM SERPL-MCNC: 4.8 MMOL/L — SIGNIFICANT CHANGE UP (ref 3.5–5.3)
POTASSIUM SERPL-MCNC: 5 MMOL/L — SIGNIFICANT CHANGE UP (ref 3.5–5.3)
POTASSIUM SERPL-MCNC: 5 MMOL/L — SIGNIFICANT CHANGE UP (ref 3.5–5.3)
POTASSIUM SERPL-MCNC: 5.1 MMOL/L — SIGNIFICANT CHANGE UP (ref 3.5–5.3)
POTASSIUM SERPL-MCNC: 5.5 MMOL/L — HIGH (ref 3.5–5.3)
POTASSIUM SERPL-MCNC: 5.6 MMOL/L — HIGH (ref 3.5–5.3)
POTASSIUM SERPL-MCNC: 6 MMOL/L — HIGH (ref 3.5–5.3)
POTASSIUM SERPL-MCNC: 6.2 MMOL/L — CRITICAL HIGH (ref 3.5–5.3)
POTASSIUM SERPL-MCNC: 6.3 MMOL/L — CRITICAL HIGH (ref 3.5–5.3)
POTASSIUM SERPL-MCNC: 6.6 MMOL/L — CRITICAL HIGH (ref 3.5–5.3)
POTASSIUM SERPL-SCNC: 3.7 MMOL/L — SIGNIFICANT CHANGE UP (ref 3.5–5.3)
POTASSIUM SERPL-SCNC: 3.9 MMOL/L — SIGNIFICANT CHANGE UP (ref 3.5–5.3)
POTASSIUM SERPL-SCNC: 4.1 MMOL/L — SIGNIFICANT CHANGE UP (ref 3.5–5.3)
POTASSIUM SERPL-SCNC: 4.2 MMOL/L — SIGNIFICANT CHANGE UP (ref 3.5–5.3)
POTASSIUM SERPL-SCNC: 4.3 MMOL/L — SIGNIFICANT CHANGE UP (ref 3.5–5.3)
POTASSIUM SERPL-SCNC: 4.3 MMOL/L — SIGNIFICANT CHANGE UP (ref 3.5–5.3)
POTASSIUM SERPL-SCNC: 4.5 MMOL/L — SIGNIFICANT CHANGE UP (ref 3.5–5.3)
POTASSIUM SERPL-SCNC: 4.7 MMOL/L — SIGNIFICANT CHANGE UP (ref 3.5–5.3)
POTASSIUM SERPL-SCNC: 4.8 MMOL/L — SIGNIFICANT CHANGE UP (ref 3.5–5.3)
POTASSIUM SERPL-SCNC: 5 MMOL/L — SIGNIFICANT CHANGE UP (ref 3.5–5.3)
POTASSIUM SERPL-SCNC: 5 MMOL/L — SIGNIFICANT CHANGE UP (ref 3.5–5.3)
POTASSIUM SERPL-SCNC: 5.1 MMOL/L — SIGNIFICANT CHANGE UP (ref 3.5–5.3)
POTASSIUM SERPL-SCNC: 5.5 MMOL/L — HIGH (ref 3.5–5.3)
POTASSIUM SERPL-SCNC: 5.6 MMOL/L — HIGH (ref 3.5–5.3)
POTASSIUM SERPL-SCNC: 6 MMOL/L — HIGH (ref 3.5–5.3)
POTASSIUM SERPL-SCNC: 6.2 MMOL/L — CRITICAL HIGH (ref 3.5–5.3)
POTASSIUM SERPL-SCNC: 6.3 MMOL/L — CRITICAL HIGH (ref 3.5–5.3)
POTASSIUM SERPL-SCNC: 6.6 MMOL/L — CRITICAL HIGH (ref 3.5–5.3)
PROT PATTERN SERPL ELPH-IMP: SIGNIFICANT CHANGE UP
PROT SERPL-MCNC: 4.5 G/DL — LOW (ref 6–8.3)
PROT SERPL-MCNC: 4.5 G/DL — LOW (ref 6–8.3)
PROT SERPL-MCNC: 4.6 G/DL — LOW (ref 6–8.3)
PROT SERPL-MCNC: 4.8 G/DL — LOW (ref 6–8.3)
PROT SERPL-MCNC: 4.8 G/DL — LOW (ref 6–8.3)
PROT SERPL-MCNC: 4.9 G/DL — LOW (ref 6–8.3)
PROT SERPL-MCNC: 5 G/DL — LOW (ref 6–8.3)
PROT SERPL-MCNC: 5.1 G/DL — LOW (ref 6–8.3)
PROT SERPL-MCNC: 5.1 G/DL — LOW (ref 6–8.3)
PROT SERPL-MCNC: 5.3 G/DL — LOW (ref 6–8.3)
PROT SERPL-MCNC: 5.4 G/DL — LOW (ref 6–8.3)
PROT SERPL-MCNC: 5.7 G/DL — LOW (ref 6–8.3)
PROT SERPL-MCNC: 6 G/DL — SIGNIFICANT CHANGE UP (ref 6–8.3)
PROT SERPL-MCNC: 6.5 G/DL — SIGNIFICANT CHANGE UP (ref 6–8.3)
PROT UR-MCNC: 100
PROT UR-MCNC: 500 MG/DL
PROTEIN URINE: ABNORMAL
PROTHROM AB SERPL-ACNC: 12.1 SEC — SIGNIFICANT CHANGE UP (ref 10.6–13.6)
PROTHROM AB SERPL-ACNC: 12.3 SEC — SIGNIFICANT CHANGE UP (ref 10.6–13.6)
PROTHROM AB SERPL-ACNC: 12.4 SEC — SIGNIFICANT CHANGE UP (ref 10.6–13.6)
PROTHROM AB SERPL-ACNC: 12.5 SEC — SIGNIFICANT CHANGE UP (ref 10.6–13.6)
RBC # BLD: 2.5 M/UL — LOW (ref 3.8–5.2)
RBC # BLD: 2.77 M/UL — LOW (ref 3.8–5.2)
RBC # BLD: 2.86 M/UL — LOW (ref 3.8–5.2)
RBC # BLD: 2.98 M/UL — LOW (ref 3.8–5.2)
RBC # BLD: 2.98 M/UL — LOW (ref 3.8–5.2)
RBC # BLD: 3 M/UL — LOW (ref 3.8–5.2)
RBC # BLD: 3.07 M/UL — LOW (ref 3.8–5.2)
RBC # BLD: 3.11 M/UL — LOW (ref 3.8–5.2)
RBC # BLD: 3.14 M/UL — LOW (ref 3.8–5.2)
RBC # BLD: 3.19 M/UL — LOW (ref 3.8–5.2)
RBC # BLD: 3.26 M/UL — LOW (ref 3.8–5.2)
RBC # BLD: 3.29 M/UL — LOW (ref 3.8–5.2)
RBC # BLD: 3.39 M/UL — LOW (ref 3.8–5.2)
RBC # BLD: 3.4 M/UL — LOW (ref 3.8–5.2)
RBC # BLD: 3.42 M/UL — LOW (ref 3.8–5.2)
RBC # BLD: 3.42 M/UL — LOW (ref 3.8–5.2)
RBC # BLD: 3.55 M/UL — LOW (ref 3.8–5.2)
RBC # BLD: 3.81 M/UL — SIGNIFICANT CHANGE UP (ref 3.8–5.2)
RBC # BLD: 3.82 M/UL — SIGNIFICANT CHANGE UP (ref 3.8–5.2)
RBC # FLD: 13.3 % — SIGNIFICANT CHANGE UP (ref 10.3–14.5)
RBC # FLD: 13.4 % — SIGNIFICANT CHANGE UP (ref 10.3–14.5)
RBC # FLD: 13.8 % — SIGNIFICANT CHANGE UP (ref 10.3–14.5)
RBC # FLD: 13.9 % — SIGNIFICANT CHANGE UP (ref 10.3–14.5)
RBC # FLD: 14 % — SIGNIFICANT CHANGE UP (ref 10.3–14.5)
RBC # FLD: 14.1 % — SIGNIFICANT CHANGE UP (ref 10.3–14.5)
RBC # FLD: 14.1 % — SIGNIFICANT CHANGE UP (ref 10.3–14.5)
RBC # FLD: 14.2 % — SIGNIFICANT CHANGE UP (ref 10.3–14.5)
RBC # FLD: 14.3 % — SIGNIFICANT CHANGE UP (ref 10.3–14.5)
RBC # FLD: 14.3 % — SIGNIFICANT CHANGE UP (ref 10.3–14.5)
RBC # FLD: 14.4 % — SIGNIFICANT CHANGE UP (ref 10.3–14.5)
RBC # FLD: 14.5 % — SIGNIFICANT CHANGE UP (ref 10.3–14.5)
RBC # FLD: 14.5 % — SIGNIFICANT CHANGE UP (ref 10.3–14.5)
RBC # FLD: 14.6 % — HIGH (ref 10.3–14.5)
RBC # FLD: 14.6 % — HIGH (ref 10.3–14.5)
RBC # FLD: 14.7 % — HIGH (ref 10.3–14.5)
RBC # FLD: 14.9 % — HIGH (ref 10.3–14.5)
RBC BLD AUTO: ABNORMAL
RBC CASTS # UR COMP ASSIST: >50 /HPF (ref 0–2)
RBC CASTS # UR COMP ASSIST: >50 /HPF (ref 0–2)
RBC CASTS # UR COMP ASSIST: ABNORMAL /HPF (ref 0–2)
RED BLOOD CELLS URINE: 98 /HPF
SARS-COV-2 RNA SPEC QL NAA+PROBE: SIGNIFICANT CHANGE UP
SODIUM SERPL-SCNC: 130 MMOL/L — LOW (ref 135–145)
SODIUM SERPL-SCNC: 130 MMOL/L — LOW (ref 135–145)
SODIUM SERPL-SCNC: 132 MMOL/L — LOW (ref 135–145)
SODIUM SERPL-SCNC: 132 MMOL/L — LOW (ref 135–145)
SODIUM SERPL-SCNC: 134 MMOL/L — LOW (ref 135–145)
SODIUM SERPL-SCNC: 134 MMOL/L — LOW (ref 135–145)
SODIUM SERPL-SCNC: 136 MMOL/L — SIGNIFICANT CHANGE UP (ref 135–145)
SODIUM SERPL-SCNC: 136 MMOL/L — SIGNIFICANT CHANGE UP (ref 135–145)
SODIUM SERPL-SCNC: 138 MMOL/L — SIGNIFICANT CHANGE UP (ref 135–145)
SODIUM SERPL-SCNC: 139 MMOL/L — SIGNIFICANT CHANGE UP (ref 135–145)
SODIUM SERPL-SCNC: 140 MMOL/L — SIGNIFICANT CHANGE UP (ref 135–145)
SODIUM SERPL-SCNC: 141 MMOL/L — SIGNIFICANT CHANGE UP (ref 135–145)
SODIUM UR-SCNC: 44 MMOL/L — SIGNIFICANT CHANGE UP
SP GR SPEC: 1.01 — SIGNIFICANT CHANGE UP (ref 1.01–1.02)
SP GR SPEC: 1.01 — SIGNIFICANT CHANGE UP (ref 1.01–1.02)
SP GR SPEC: 1.02 — SIGNIFICANT CHANGE UP (ref 1.01–1.02)
SP GR SPEC: 1.02 — SIGNIFICANT CHANGE UP (ref 1.01–1.02)
SPECIFIC GRAVITY URINE: 1.02
SPECIMEN SOURCE: SIGNIFICANT CHANGE UP
SPHEROCYTES BLD QL SMEAR: SLIGHT — SIGNIFICANT CHANGE UP
SQUAMOUS EPITHELIAL CELLS: 2 /HPF
TIBC SERPL-MCNC: 192 UG/DL — LOW (ref 250–450)
TRIGL SERPL-MCNC: 60 MG/DL — SIGNIFICANT CHANGE UP
TROPONIN I, HIGH SENSITIVITY RESULT: 13.3 NG/L — SIGNIFICANT CHANGE UP
TROPONIN I, HIGH SENSITIVITY RESULT: 9 NG/L — SIGNIFICANT CHANGE UP
UIBC SERPL-MCNC: 148 UG/DL — SIGNIFICANT CHANGE UP (ref 110–370)
URATE SERPL-MCNC: 10 MG/DL — HIGH (ref 2.5–7)
UROBILINOGEN FLD QL: 1
UROBILINOGEN FLD QL: NEGATIVE — SIGNIFICANT CHANGE UP
UROBILINOGEN URINE: NORMAL
WBC # BLD: 19.38 K/UL — HIGH (ref 3.8–10.5)
WBC # BLD: 20.32 K/UL — HIGH (ref 3.8–10.5)
WBC # BLD: 21.08 K/UL — HIGH (ref 3.8–10.5)
WBC # BLD: 23.34 K/UL — HIGH (ref 3.8–10.5)
WBC # BLD: 24.6 K/UL — HIGH (ref 3.8–10.5)
WBC # BLD: 24.79 K/UL — HIGH (ref 3.8–10.5)
WBC # BLD: 25.52 K/UL — HIGH (ref 3.8–10.5)
WBC # BLD: 25.52 K/UL — HIGH (ref 3.8–10.5)
WBC # BLD: 25.91 K/UL — HIGH (ref 3.8–10.5)
WBC # BLD: 28.62 K/UL — HIGH (ref 3.8–10.5)
WBC # BLD: 29.12 K/UL — HIGH (ref 3.8–10.5)
WBC # BLD: 29.48 K/UL — HIGH (ref 3.8–10.5)
WBC # BLD: 32.09 K/UL — HIGH (ref 3.8–10.5)
WBC # BLD: 32.21 K/UL — HIGH (ref 3.8–10.5)
WBC # BLD: 32.43 K/UL — HIGH (ref 3.8–10.5)
WBC # BLD: 38.21 K/UL — HIGH (ref 3.8–10.5)
WBC # BLD: 39.51 K/UL — HIGH (ref 3.8–10.5)
WBC # BLD: 54.69 K/UL — CRITICAL HIGH (ref 3.8–10.5)
WBC # BLD: 61.56 K/UL — CRITICAL HIGH (ref 3.8–10.5)
WBC # FLD AUTO: 19.38 K/UL — HIGH (ref 3.8–10.5)
WBC # FLD AUTO: 20.32 K/UL — HIGH (ref 3.8–10.5)
WBC # FLD AUTO: 21.08 K/UL — HIGH (ref 3.8–10.5)
WBC # FLD AUTO: 23.34 K/UL — HIGH (ref 3.8–10.5)
WBC # FLD AUTO: 24.6 K/UL — HIGH (ref 3.8–10.5)
WBC # FLD AUTO: 24.79 K/UL — HIGH (ref 3.8–10.5)
WBC # FLD AUTO: 25.52 K/UL — HIGH (ref 3.8–10.5)
WBC # FLD AUTO: 25.52 K/UL — HIGH (ref 3.8–10.5)
WBC # FLD AUTO: 25.91 K/UL — HIGH (ref 3.8–10.5)
WBC # FLD AUTO: 28.62 K/UL — HIGH (ref 3.8–10.5)
WBC # FLD AUTO: 29.12 K/UL — HIGH (ref 3.8–10.5)
WBC # FLD AUTO: 29.48 K/UL — HIGH (ref 3.8–10.5)
WBC # FLD AUTO: 32.09 K/UL — HIGH (ref 3.8–10.5)
WBC # FLD AUTO: 32.21 K/UL — HIGH (ref 3.8–10.5)
WBC # FLD AUTO: 32.43 K/UL — HIGH (ref 3.8–10.5)
WBC # FLD AUTO: 38.21 K/UL — HIGH (ref 3.8–10.5)
WBC # FLD AUTO: 39.51 K/UL — HIGH (ref 3.8–10.5)
WBC # FLD AUTO: 54.69 K/UL — CRITICAL HIGH (ref 3.8–10.5)
WBC # FLD AUTO: 61.56 K/UL — CRITICAL HIGH (ref 3.8–10.5)
WBC UR QL: >50 /HPF (ref 0–5)
WBC UR QL: ABNORMAL /HPF (ref 0–5)
WBC UR QL: SIGNIFICANT CHANGE UP /HPF (ref 0–5)
WHITE BLOOD CELLS URINE: 62 /HPF

## 2022-01-01 PROCEDURE — 70450 CT HEAD/BRAIN W/O DYE: CPT | Mod: 26,MA

## 2022-01-01 PROCEDURE — 81001 URINALYSIS AUTO W/SCOPE: CPT

## 2022-01-01 PROCEDURE — 99233 SBSQ HOSP IP/OBS HIGH 50: CPT | Mod: GC

## 2022-01-01 PROCEDURE — 86850 RBC ANTIBODY SCREEN: CPT

## 2022-01-01 PROCEDURE — 99233 SBSQ HOSP IP/OBS HIGH 50: CPT

## 2022-01-01 PROCEDURE — 85610 PROTHROMBIN TIME: CPT

## 2022-01-01 PROCEDURE — 74177 CT ABD & PELVIS W/CONTRAST: CPT

## 2022-01-01 PROCEDURE — A9503: CPT

## 2022-01-01 PROCEDURE — 24345 REPR ELBW MED LIGMNT W/TISSU: CPT | Mod: AS,59,LT

## 2022-01-01 PROCEDURE — 70450 CT HEAD/BRAIN W/O DYE: CPT | Mod: MA

## 2022-01-01 PROCEDURE — 77071 MNL APPL STRS JT RADIOGRAPHY: CPT

## 2022-01-01 PROCEDURE — 84155 ASSAY OF PROTEIN SERUM: CPT

## 2022-01-01 PROCEDURE — 71045 X-RAY EXAM CHEST 1 VIEW: CPT | Mod: 26

## 2022-01-01 PROCEDURE — 74177 CT ABD & PELVIS W/CONTRAST: CPT | Mod: 26

## 2022-01-01 PROCEDURE — 83036 HEMOGLOBIN GLYCOSYLATED A1C: CPT

## 2022-01-01 PROCEDURE — 99497 ADVNCD CARE PLAN 30 MIN: CPT | Mod: 25

## 2022-01-01 PROCEDURE — 73080 X-RAY EXAM OF ELBOW: CPT | Mod: 26,LT

## 2022-01-01 PROCEDURE — 74178 CT ABD&PLV WO CNTR FLWD CNTR: CPT

## 2022-01-01 PROCEDURE — 85025 COMPLETE CBC W/AUTO DIFF WBC: CPT

## 2022-01-01 PROCEDURE — 99232 SBSQ HOSP IP/OBS MODERATE 35: CPT

## 2022-01-01 PROCEDURE — 72195 MRI PELVIS W/O DYE: CPT

## 2022-01-01 PROCEDURE — 86334 IMMUNOFIX E-PHORESIS SERUM: CPT

## 2022-01-01 PROCEDURE — 99223 1ST HOSP IP/OBS HIGH 75: CPT | Mod: GC

## 2022-01-01 PROCEDURE — 72192 CT PELVIS W/O DYE: CPT | Mod: 26

## 2022-01-01 PROCEDURE — 36415 COLL VENOUS BLD VENIPUNCTURE: CPT

## 2022-01-01 PROCEDURE — 92610 EVALUATE SWALLOWING FUNCTION: CPT

## 2022-01-01 PROCEDURE — 24343 REPR ELBOW LAT LIGMNT W/TISS: CPT | Mod: AS,59,LT

## 2022-01-01 PROCEDURE — 71250 CT THORAX DX C-: CPT

## 2022-01-01 PROCEDURE — 74176 CT ABD & PELVIS W/O CONTRAST: CPT | Mod: MA

## 2022-01-01 PROCEDURE — P9040: CPT

## 2022-01-01 PROCEDURE — 85027 COMPLETE CBC AUTOMATED: CPT

## 2022-01-01 PROCEDURE — 80053 COMPREHEN METABOLIC PANEL: CPT

## 2022-01-01 PROCEDURE — 83550 IRON BINDING TEST: CPT

## 2022-01-01 PROCEDURE — 80048 BASIC METABOLIC PNL TOTAL CA: CPT

## 2022-01-01 PROCEDURE — 78306 BONE IMAGING WHOLE BODY: CPT | Mod: 26

## 2022-01-01 PROCEDURE — 99231 SBSQ HOSP IP/OBS SF/LOW 25: CPT

## 2022-01-01 PROCEDURE — 97116 GAIT TRAINING THERAPY: CPT

## 2022-01-01 PROCEDURE — 84484 ASSAY OF TROPONIN QUANT: CPT

## 2022-01-01 PROCEDURE — 84100 ASSAY OF PHOSPHORUS: CPT

## 2022-01-01 PROCEDURE — 74018 RADEX ABDOMEN 1 VIEW: CPT

## 2022-01-01 PROCEDURE — 83540 ASSAY OF IRON: CPT

## 2022-01-01 PROCEDURE — 99222 1ST HOSP IP/OBS MODERATE 55: CPT

## 2022-01-01 PROCEDURE — 27066 REMOVE HIP BONE LES DEEP: CPT | Mod: AS,59,LT

## 2022-01-01 PROCEDURE — 82728 ASSAY OF FERRITIN: CPT

## 2022-01-01 PROCEDURE — 83735 ASSAY OF MAGNESIUM: CPT

## 2022-01-01 PROCEDURE — 74178 CT ABD&PLV WO CNTR FLWD CNTR: CPT | Mod: 26

## 2022-01-01 PROCEDURE — 86900 BLOOD TYPING SEROLOGIC ABO: CPT

## 2022-01-01 PROCEDURE — 87635 SARS-COV-2 COVID-19 AMP PRB: CPT

## 2022-01-01 PROCEDURE — 80076 HEPATIC FUNCTION PANEL: CPT

## 2022-01-01 PROCEDURE — C1713: CPT

## 2022-01-01 PROCEDURE — 87040 BLOOD CULTURE FOR BACTERIA: CPT

## 2022-01-01 PROCEDURE — 83935 ASSAY OF URINE OSMOLALITY: CPT

## 2022-01-01 PROCEDURE — 73718 MRI LOWER EXTREMITY W/O DYE: CPT

## 2022-01-01 PROCEDURE — 71250 CT THORAX DX C-: CPT | Mod: 26

## 2022-01-01 PROCEDURE — 99223 1ST HOSP IP/OBS HIGH 75: CPT

## 2022-01-01 PROCEDURE — 71250 CT THORAX DX C-: CPT | Mod: MA

## 2022-01-01 PROCEDURE — 24685 OPTX ULNAR FX PROX END W/FIX: CPT | Mod: AS,LT

## 2022-01-01 PROCEDURE — 97530 THERAPEUTIC ACTIVITIES: CPT

## 2022-01-01 PROCEDURE — 74176 CT ABD & PELVIS W/O CONTRAST: CPT | Mod: 26,MA

## 2022-01-01 PROCEDURE — 97110 THERAPEUTIC EXERCISES: CPT

## 2022-01-01 PROCEDURE — 36430 TRANSFUSION BLD/BLD COMPNT: CPT

## 2022-01-01 PROCEDURE — 74018 RADEX ABDOMEN 1 VIEW: CPT | Mod: 26

## 2022-01-01 PROCEDURE — 82962 GLUCOSE BLOOD TEST: CPT

## 2022-01-01 PROCEDURE — 83880 ASSAY OF NATRIURETIC PEPTIDE: CPT

## 2022-01-01 PROCEDURE — 76000 FLUOROSCOPY <1 HR PHYS/QHP: CPT | Mod: 26

## 2022-01-01 PROCEDURE — 72192 CT PELVIS W/O DYE: CPT

## 2022-01-01 PROCEDURE — 99239 HOSP IP/OBS DSCHRG MGMT >30: CPT

## 2022-01-01 PROCEDURE — 93005 ELECTROCARDIOGRAM TRACING: CPT

## 2022-01-01 PROCEDURE — 82570 ASSAY OF URINE CREATININE: CPT

## 2022-01-01 PROCEDURE — 99214 OFFICE O/P EST MOD 30 MIN: CPT

## 2022-01-01 PROCEDURE — 71250 CT THORAX DX C-: CPT | Mod: 26,MA

## 2022-01-01 PROCEDURE — 83690 ASSAY OF LIPASE: CPT

## 2022-01-01 PROCEDURE — 73502 X-RAY EXAM HIP UNI 2-3 VIEWS: CPT | Mod: 26,LT

## 2022-01-01 PROCEDURE — 99285 EMERGENCY DEPT VISIT HI MDM: CPT

## 2022-01-01 PROCEDURE — 73718 MRI LOWER EXTREMITY W/O DYE: CPT | Mod: 26,LT

## 2022-01-01 PROCEDURE — 83605 ASSAY OF LACTIC ACID: CPT

## 2022-01-01 PROCEDURE — 73080 X-RAY EXAM OF ELBOW: CPT

## 2022-01-01 PROCEDURE — 84300 ASSAY OF URINE SODIUM: CPT

## 2022-01-01 PROCEDURE — 27245 TREAT THIGH FRACTURE: CPT | Mod: AS,LT

## 2022-01-01 PROCEDURE — 85379 FIBRIN DEGRADATION QUANT: CPT

## 2022-01-01 PROCEDURE — 80061 LIPID PANEL: CPT

## 2022-01-01 PROCEDURE — 94640 AIRWAY INHALATION TREATMENT: CPT

## 2022-01-01 PROCEDURE — 73502 X-RAY EXAM HIP UNI 2-3 VIEWS: CPT

## 2022-01-01 PROCEDURE — 84165 PROTEIN E-PHORESIS SERUM: CPT

## 2022-01-01 PROCEDURE — 85730 THROMBOPLASTIN TIME PARTIAL: CPT

## 2022-01-01 PROCEDURE — 99291 CRITICAL CARE FIRST HOUR: CPT

## 2022-01-01 PROCEDURE — 86901 BLOOD TYPING SEROLOGIC RH(D): CPT

## 2022-01-01 PROCEDURE — 87086 URINE CULTURE/COLONY COUNT: CPT

## 2022-01-01 PROCEDURE — 71045 X-RAY EXAM CHEST 1 VIEW: CPT

## 2022-01-01 PROCEDURE — 72195 MRI PELVIS W/O DYE: CPT | Mod: 26

## 2022-01-01 PROCEDURE — 84550 ASSAY OF BLOOD/URIC ACID: CPT

## 2022-01-01 PROCEDURE — 97163 PT EVAL HIGH COMPLEX 45 MIN: CPT

## 2022-01-01 PROCEDURE — 20900 REMOVAL OF BONE FOR GRAFT: CPT | Mod: AS

## 2022-01-01 PROCEDURE — 76000 FLUOROSCOPY <1 HR PHYS/QHP: CPT

## 2022-01-01 PROCEDURE — 78306 BONE IMAGING WHOLE BODY: CPT

## 2022-01-01 PROCEDURE — 86923 COMPATIBILITY TEST ELECTRIC: CPT

## 2022-01-01 DEVICE — SCREW LOK FT T2 5X40MM VIT: Type: IMPLANTABLE DEVICE | Site: LEFT | Status: FUNCTIONAL

## 2022-01-01 DEVICE — IMPLANTABLE DEVICE: Type: IMPLANTABLE DEVICE | Site: LEFT | Status: FUNCTIONAL

## 2022-01-01 DEVICE — SCREW LOKG 3.5X14MM: Type: IMPLANTABLE DEVICE | Site: LEFT | Status: FUNCTIONAL

## 2022-01-01 DEVICE — SCREW LOCKING 3.5X20: Type: IMPLANTABLE DEVICE | Site: LEFT | Status: FUNCTIONAL

## 2022-01-01 DEVICE — SCREW 3.5X22: Type: IMPLANTABLE DEVICE | Site: LEFT | Status: FUNCTIONAL

## 2022-01-01 DEVICE — SCREW 3.5X20: Type: IMPLANTABLE DEVICE | Site: LEFT | Status: FUNCTIONAL

## 2022-01-01 DEVICE — SET SCR GAMMA: Type: IMPLANTABLE DEVICE | Site: LEFT | Status: FUNCTIONAL

## 2022-01-01 DEVICE — KWIRE 3.2X450MM: Type: IMPLANTABLE DEVICE | Site: LEFT | Status: FUNCTIONAL

## 2022-01-01 DEVICE — WIRE 2806 0085S: Type: IMPLANTABLE DEVICE | Site: LEFT | Status: FUNCTIONAL

## 2022-01-01 DEVICE — SCREW GAMMA LAG 10.5MMX80MM: Type: IMPLANTABLE DEVICE | Site: LEFT | Status: FUNCTIONAL

## 2022-01-01 DEVICE — SCREW VARIAX LOCKING 3.5X18MM: Type: IMPLANTABLE DEVICE | Site: LEFT | Status: FUNCTIONAL

## 2022-01-01 RX ORDER — OXYCODONE HYDROCHLORIDE 5 MG/1
2.5 TABLET ORAL EVERY 4 HOURS
Refills: 0 | Status: DISCONTINUED | OUTPATIENT
Start: 2022-01-01 | End: 2022-01-01

## 2022-01-01 RX ORDER — INSULIN HUMAN 100 [IU]/ML
10 INJECTION, SOLUTION SUBCUTANEOUS ONCE
Refills: 0 | Status: COMPLETED | OUTPATIENT
Start: 2022-01-01 | End: 2022-01-01

## 2022-01-01 RX ORDER — OXYBUTYNIN CHLORIDE 5 MG
10 TABLET ORAL
Refills: 0 | Status: DISCONTINUED | OUTPATIENT
Start: 2022-01-01 | End: 2022-01-01

## 2022-01-01 RX ORDER — HYDROMORPHONE HYDROCHLORIDE 2 MG/ML
0.5 INJECTION INTRAMUSCULAR; INTRAVENOUS; SUBCUTANEOUS
Qty: 0 | Refills: 0 | DISCHARGE
Start: 2022-01-01

## 2022-01-01 RX ORDER — OXYCODONE AND ACETAMINOPHEN 5; 325 MG/1; MG/1
1 TABLET ORAL ONCE
Refills: 0 | Status: DISCONTINUED | OUTPATIENT
Start: 2022-01-01 | End: 2022-01-01

## 2022-01-01 RX ORDER — ACETAMINOPHEN 500 MG
650 TABLET ORAL ONCE
Refills: 0 | Status: COMPLETED | OUTPATIENT
Start: 2022-01-01 | End: 2022-01-01

## 2022-01-01 RX ORDER — HYDROMORPHONE HYDROCHLORIDE 2 MG/ML
0.5 INJECTION INTRAMUSCULAR; INTRAVENOUS; SUBCUTANEOUS EVERY 4 HOURS
Refills: 0 | Status: DISCONTINUED | OUTPATIENT
Start: 2022-01-01 | End: 2022-01-01

## 2022-01-01 RX ORDER — ACETAMINOPHEN 500 MG
1000 TABLET ORAL EVERY 8 HOURS
Refills: 0 | Status: DISCONTINUED | OUTPATIENT
Start: 2022-01-01 | End: 2022-01-01

## 2022-01-01 RX ORDER — ENOXAPARIN SODIUM 100 MG/ML
40 INJECTION SUBCUTANEOUS
Qty: 0 | Refills: 0 | DISCHARGE
Start: 2022-01-01 | End: 2022-01-01

## 2022-01-01 RX ORDER — SODIUM ZIRCONIUM CYCLOSILICATE 10 G/10G
10 POWDER, FOR SUSPENSION ORAL ONCE
Refills: 0 | Status: COMPLETED | OUTPATIENT
Start: 2022-01-01 | End: 2022-01-01

## 2022-01-01 RX ORDER — FERROUS SULFATE 325(65) MG
1 TABLET ORAL
Qty: 0 | Refills: 0 | DISCHARGE

## 2022-01-01 RX ORDER — SENNA PLUS 8.6 MG/1
2 TABLET ORAL AT BEDTIME
Refills: 0 | Status: DISCONTINUED | OUTPATIENT
Start: 2022-01-01 | End: 2022-01-01

## 2022-01-01 RX ORDER — ALBUTEROL 90 UG/1
2.5 AEROSOL, METERED ORAL ONCE
Refills: 0 | Status: COMPLETED | OUTPATIENT
Start: 2022-01-01 | End: 2022-01-01

## 2022-01-01 RX ORDER — POLYETHYLENE GLYCOL 3350 17 G/17G
17 POWDER, FOR SOLUTION ORAL
Refills: 0 | Status: DISCONTINUED | OUTPATIENT
Start: 2022-01-01 | End: 2022-01-01

## 2022-01-01 RX ORDER — HYDROMORPHONE HYDROCHLORIDE 2 MG/ML
0.5 INJECTION INTRAMUSCULAR; INTRAVENOUS; SUBCUTANEOUS
Qty: 100 | Refills: 0 | Status: DISCONTINUED | OUTPATIENT
Start: 2022-01-01 | End: 2022-01-01

## 2022-01-01 RX ORDER — CEFTRIAXONE 500 MG/1
1000 INJECTION, POWDER, FOR SOLUTION INTRAMUSCULAR; INTRAVENOUS EVERY 24 HOURS
Refills: 0 | Status: COMPLETED | OUTPATIENT
Start: 2022-01-01 | End: 2022-01-01

## 2022-01-01 RX ORDER — SULFAMETHOXAZOLE AND TRIMETHOPRIM 800; 160 MG/1; MG/1
800-160 TABLET ORAL TWICE DAILY
Qty: 14 | Refills: 0 | Status: ACTIVE | COMMUNITY
Start: 2021-01-01 | End: 1900-01-01

## 2022-01-01 RX ORDER — RASBURICASE 7.5 MG
6 KIT INTRAVENOUS ONCE
Refills: 0 | Status: COMPLETED | OUTPATIENT
Start: 2022-01-01 | End: 2022-01-01

## 2022-01-01 RX ORDER — OXYCODONE HYDROCHLORIDE 5 MG/1
1 TABLET ORAL
Qty: 0 | Refills: 0 | DISCHARGE
Start: 2022-01-01

## 2022-01-01 RX ORDER — SODIUM CHLORIDE 9 MG/ML
1000 INJECTION INTRAMUSCULAR; INTRAVENOUS; SUBCUTANEOUS
Refills: 0 | Status: DISCONTINUED | OUTPATIENT
Start: 2022-01-01 | End: 2022-01-01

## 2022-01-01 RX ORDER — MEROPENEM 1 G/30ML
500 INJECTION INTRAVENOUS EVERY 24 HOURS
Refills: 0 | Status: DISCONTINUED | OUTPATIENT
Start: 2022-01-01 | End: 2022-01-01

## 2022-01-01 RX ORDER — MEROPENEM 1 G/30ML
INJECTION INTRAVENOUS
Refills: 0 | Status: DISCONTINUED | OUTPATIENT
Start: 2022-01-01 | End: 2022-01-01

## 2022-01-01 RX ORDER — HYDROMORPHONE HYDROCHLORIDE 2 MG/ML
0.5 INJECTION INTRAMUSCULAR; INTRAVENOUS; SUBCUTANEOUS ONCE
Refills: 0 | Status: DISCONTINUED | OUTPATIENT
Start: 2022-01-01 | End: 2022-01-01

## 2022-01-01 RX ORDER — SODIUM CHLORIDE 9 MG/ML
1000 INJECTION, SOLUTION INTRAVENOUS
Refills: 0 | Status: DISCONTINUED | OUTPATIENT
Start: 2022-01-01 | End: 2022-01-01

## 2022-01-01 RX ORDER — BUDESONIDE AND FORMOTEROL FUMARATE DIHYDRATE 160; 4.5 UG/1; UG/1
2 AEROSOL RESPIRATORY (INHALATION)
Refills: 0 | Status: DISCONTINUED | OUTPATIENT
Start: 2022-01-01 | End: 2022-01-01

## 2022-01-01 RX ORDER — FERROUS SULFATE 325(65) MG
1 TABLET ORAL
Qty: 0 | Refills: 0 | DISCHARGE
Start: 2022-01-01

## 2022-01-01 RX ORDER — HEPARIN SODIUM 5000 [USP'U]/ML
5000 INJECTION INTRAVENOUS; SUBCUTANEOUS EVERY 8 HOURS
Refills: 0 | Status: DISCONTINUED | OUTPATIENT
Start: 2022-01-01 | End: 2022-01-01

## 2022-01-01 RX ORDER — ACETAMINOPHEN 500 MG
1000 TABLET ORAL EVERY 8 HOURS
Refills: 0 | Status: COMPLETED | OUTPATIENT
Start: 2022-01-01 | End: 2022-01-01

## 2022-01-01 RX ORDER — HYDROMORPHONE HYDROCHLORIDE 2 MG/ML
0.5 INJECTION INTRAMUSCULAR; INTRAVENOUS; SUBCUTANEOUS
Refills: 0 | Status: DISCONTINUED | OUTPATIENT
Start: 2022-01-01 | End: 2022-01-01

## 2022-01-01 RX ORDER — SODIUM CHLORIDE 9 MG/ML
1000 INJECTION, SOLUTION INTRAVENOUS
Qty: 0 | Refills: 0 | DISCHARGE
Start: 2022-01-01

## 2022-01-01 RX ORDER — ONDANSETRON 8 MG/1
1 TABLET, FILM COATED ORAL
Qty: 0 | Refills: 0 | DISCHARGE

## 2022-01-01 RX ORDER — CEFAZOLIN SODIUM 1 G
2000 VIAL (EA) INJECTION EVERY 8 HOURS
Refills: 0 | Status: DISCONTINUED | OUTPATIENT
Start: 2022-01-01 | End: 2022-01-01

## 2022-01-01 RX ORDER — MAGNESIUM HYDROXIDE 400 MG/1
30 TABLET, CHEWABLE ORAL DAILY
Refills: 0 | Status: DISCONTINUED | OUTPATIENT
Start: 2022-01-01 | End: 2022-01-01

## 2022-01-01 RX ORDER — HYDROMORPHONE HYDROCHLORIDE 2 MG/ML
0.25 INJECTION INTRAMUSCULAR; INTRAVENOUS; SUBCUTANEOUS
Refills: 0 | Status: DISCONTINUED | OUTPATIENT
Start: 2022-01-01 | End: 2022-01-01

## 2022-01-01 RX ORDER — ALBUTEROL 90 UG/1
2 AEROSOL, METERED ORAL
Qty: 0 | Refills: 0 | DISCHARGE

## 2022-01-01 RX ORDER — MORPHINE SULFATE 50 MG/1
1 CAPSULE, EXTENDED RELEASE ORAL EVERY 4 HOURS
Refills: 0 | Status: DISCONTINUED | OUTPATIENT
Start: 2022-01-01 | End: 2022-01-01

## 2022-01-01 RX ORDER — ALBUTEROL 90 UG/1
2 AEROSOL, METERED ORAL EVERY 6 HOURS
Refills: 0 | Status: DISCONTINUED | OUTPATIENT
Start: 2022-01-01 | End: 2022-01-01

## 2022-01-01 RX ORDER — FUROSEMIDE 40 MG
1 TABLET ORAL
Qty: 0 | Refills: 0 | DISCHARGE

## 2022-01-01 RX ORDER — VANCOMYCIN HCL 1 G
1000 VIAL (EA) INTRAVENOUS ONCE
Refills: 0 | Status: COMPLETED | OUTPATIENT
Start: 2022-01-01 | End: 2022-01-01

## 2022-01-01 RX ORDER — LACTULOSE 10 G/15ML
10 SOLUTION ORAL ONCE
Refills: 0 | Status: COMPLETED | OUTPATIENT
Start: 2022-01-01 | End: 2022-01-01

## 2022-01-01 RX ORDER — ROBINUL 0.2 MG/ML
0 INJECTION INTRAMUSCULAR; INTRAVENOUS
Qty: 0 | Refills: 0 | DISCHARGE
Start: 2022-01-01

## 2022-01-01 RX ORDER — SOLIFENACIN SUCCINATE 10 MG/1
1 TABLET ORAL
Qty: 0 | Refills: 0 | DISCHARGE

## 2022-01-01 RX ORDER — SOLIFENACIN SUCCINATE 10 MG/1
10 TABLET ORAL
Qty: 90 | Refills: 1 | Status: ACTIVE | COMMUNITY
Start: 2021-01-01 | End: 1900-01-01

## 2022-01-01 RX ORDER — MAGNESIUM HYDROXIDE 400 MG/1
30 TABLET, CHEWABLE ORAL ONCE
Refills: 0 | Status: COMPLETED | OUTPATIENT
Start: 2022-01-01 | End: 2022-01-01

## 2022-01-01 RX ORDER — FERROUS SULFATE 325(65) MG
325 TABLET ORAL
Refills: 0 | Status: DISCONTINUED | OUTPATIENT
Start: 2022-01-01 | End: 2022-01-01

## 2022-01-01 RX ORDER — ZINC GLUCONATE 30 MG
0 TABLET ORAL
Qty: 0 | Refills: 0 | DISCHARGE

## 2022-01-01 RX ORDER — ACETAMINOPHEN 500 MG
975 TABLET ORAL EVERY 8 HOURS
Refills: 0 | Status: DISCONTINUED | OUTPATIENT
Start: 2022-01-01 | End: 2022-01-01

## 2022-01-01 RX ORDER — CALCIUM GLUCONATE 100 MG/ML
2 VIAL (ML) INTRAVENOUS ONCE
Refills: 0 | Status: COMPLETED | OUTPATIENT
Start: 2022-01-01 | End: 2022-01-01

## 2022-01-01 RX ORDER — ONDANSETRON 8 MG/1
4 TABLET, FILM COATED ORAL ONCE
Refills: 0 | Status: DISCONTINUED | OUTPATIENT
Start: 2022-01-01 | End: 2022-01-01

## 2022-01-01 RX ORDER — ROBINUL 0.2 MG/ML
0.4 INJECTION INTRAMUSCULAR; INTRAVENOUS
Refills: 0 | Status: DISCONTINUED | OUTPATIENT
Start: 2022-01-01 | End: 2022-01-01

## 2022-01-01 RX ORDER — POLYETHYLENE GLYCOL 3350 17 G/17G
17 POWDER, FOR SOLUTION ORAL DAILY
Refills: 0 | Status: DISCONTINUED | OUTPATIENT
Start: 2022-01-01 | End: 2022-01-01

## 2022-01-01 RX ORDER — LOSARTAN POTASSIUM 100 MG/1
1 TABLET, FILM COATED ORAL
Qty: 0 | Refills: 0 | DISCHARGE

## 2022-01-01 RX ORDER — OXYBUTYNIN CHLORIDE 15 MG/1
15 TABLET, EXTENDED RELEASE ORAL DAILY
Qty: 30 | Refills: 0 | Status: ACTIVE | COMMUNITY
Start: 2022-01-01 | End: 1900-01-01

## 2022-01-01 RX ORDER — LOSARTAN POTASSIUM 100 MG/1
50 TABLET, FILM COATED ORAL DAILY
Refills: 0 | Status: DISCONTINUED | OUTPATIENT
Start: 2022-01-01 | End: 2022-01-01

## 2022-01-01 RX ORDER — OXYCODONE AND ACETAMINOPHEN 5; 325 MG/1; MG/1
2 TABLET ORAL ONCE
Refills: 0 | Status: DISCONTINUED | OUTPATIENT
Start: 2022-01-01 | End: 2022-01-01

## 2022-01-01 RX ORDER — ROBINUL 0.2 MG/ML
0.4 INJECTION INTRAMUSCULAR; INTRAVENOUS EVERY 6 HOURS
Refills: 0 | Status: DISCONTINUED | OUTPATIENT
Start: 2022-01-01 | End: 2022-01-01

## 2022-01-01 RX ORDER — ONDANSETRON 8 MG/1
4 TABLET, FILM COATED ORAL EVERY 4 HOURS
Refills: 0 | Status: DISCONTINUED | OUTPATIENT
Start: 2022-01-01 | End: 2022-01-01

## 2022-01-01 RX ORDER — ENOXAPARIN SODIUM 100 MG/ML
40 INJECTION SUBCUTANEOUS EVERY 24 HOURS
Refills: 0 | Status: DISCONTINUED | OUTPATIENT
Start: 2022-01-01 | End: 2022-01-01

## 2022-01-01 RX ORDER — CIPROFLOXACIN HYDROCHLORIDE 500 MG/1
500 TABLET, FILM COATED ORAL TWICE DAILY
Qty: 28 | Refills: 0 | Status: ACTIVE | COMMUNITY
Start: 2022-01-01 | End: 1900-01-01

## 2022-01-01 RX ORDER — PIPERACILLIN AND TAZOBACTAM 4; .5 G/20ML; G/20ML
3.38 INJECTION, POWDER, LYOPHILIZED, FOR SOLUTION INTRAVENOUS ONCE
Refills: 0 | Status: COMPLETED | OUTPATIENT
Start: 2022-01-01 | End: 2022-01-01

## 2022-01-01 RX ORDER — SENNA PLUS 8.6 MG/1
2 TABLET ORAL
Qty: 0 | Refills: 0 | DISCHARGE
Start: 2022-01-01

## 2022-01-01 RX ORDER — OXYCODONE HYDROCHLORIDE 5 MG/1
5 TABLET ORAL EVERY 6 HOURS
Refills: 0 | Status: DISCONTINUED | OUTPATIENT
Start: 2022-01-01 | End: 2022-01-01

## 2022-01-01 RX ORDER — SODIUM CHLORIDE 9 MG/ML
1000 INJECTION INTRAMUSCULAR; INTRAVENOUS; SUBCUTANEOUS ONCE
Refills: 0 | Status: COMPLETED | OUTPATIENT
Start: 2022-01-01 | End: 2022-01-01

## 2022-01-01 RX ORDER — CEFAZOLIN SODIUM 1 G
1000 VIAL (EA) INJECTION EVERY 8 HOURS
Refills: 0 | Status: COMPLETED | OUTPATIENT
Start: 2022-01-01 | End: 2022-01-01

## 2022-01-01 RX ORDER — PANTOPRAZOLE SODIUM 20 MG/1
40 TABLET, DELAYED RELEASE ORAL
Refills: 0 | Status: DISCONTINUED | OUTPATIENT
Start: 2022-01-01 | End: 2022-01-01

## 2022-01-01 RX ORDER — ALBUTEROL 90 UG/1
2 AEROSOL, METERED ORAL
Qty: 0 | Refills: 0 | DISCHARGE
Start: 2022-01-01

## 2022-01-01 RX ORDER — ASCORBIC ACID 60 MG
1 TABLET,CHEWABLE ORAL
Qty: 0 | Refills: 0 | DISCHARGE

## 2022-01-01 RX ORDER — TIOTROPIUM BROMIDE 18 UG/1
1 CAPSULE ORAL; RESPIRATORY (INHALATION)
Qty: 0 | Refills: 0 | DISCHARGE
Start: 2022-01-01

## 2022-01-01 RX ORDER — FOLIC ACID 0.8 MG
1 TABLET ORAL
Qty: 0 | Refills: 0 | DISCHARGE

## 2022-01-01 RX ORDER — CEFAZOLIN SODIUM 1 G
2000 VIAL (EA) INJECTION ONCE
Refills: 0 | Status: COMPLETED | OUTPATIENT
Start: 2022-01-01 | End: 2022-01-01

## 2022-01-01 RX ORDER — TIOTROPIUM BROMIDE 18 UG/1
1 CAPSULE ORAL; RESPIRATORY (INHALATION) DAILY
Refills: 0 | Status: DISCONTINUED | OUTPATIENT
Start: 2022-01-01 | End: 2022-01-01

## 2022-01-01 RX ORDER — DEXTROSE 50 % IN WATER 50 %
50 SYRINGE (ML) INTRAVENOUS ONCE
Refills: 0 | Status: COMPLETED | OUTPATIENT
Start: 2022-01-01 | End: 2022-01-01

## 2022-01-01 RX ORDER — MINERAL OIL
133 OIL (ML) MISCELLANEOUS ONCE
Refills: 0 | Status: COMPLETED | OUTPATIENT
Start: 2022-01-01 | End: 2022-01-01

## 2022-01-01 RX ORDER — SODIUM ZIRCONIUM CYCLOSILICATE 10 G/10G
10 POWDER, FOR SUSPENSION ORAL EVERY 8 HOURS
Refills: 0 | Status: DISCONTINUED | OUTPATIENT
Start: 2022-01-01 | End: 2022-01-01

## 2022-01-01 RX ORDER — ACETAMINOPHEN 500 MG
650 TABLET ORAL EVERY 6 HOURS
Refills: 0 | Status: DISCONTINUED | OUTPATIENT
Start: 2022-01-01 | End: 2022-01-01

## 2022-01-01 RX ORDER — CEFAZOLIN SODIUM 1 G
VIAL (EA) INJECTION
Refills: 0 | Status: DISCONTINUED | OUTPATIENT
Start: 2022-01-01 | End: 2022-01-01

## 2022-01-01 RX ORDER — CEPHALEXIN 500 MG
500 CAPSULE ORAL
Refills: 0 | Status: DISCONTINUED | OUTPATIENT
Start: 2022-01-01 | End: 2022-01-01

## 2022-01-01 RX ORDER — POLYETHYLENE GLYCOL 3350 17 G/17G
17 POWDER, FOR SOLUTION ORAL
Qty: 0 | Refills: 0 | DISCHARGE
Start: 2022-01-01

## 2022-01-01 RX ORDER — PANTOPRAZOLE SODIUM 20 MG/1
1 TABLET, DELAYED RELEASE ORAL
Qty: 0 | Refills: 0 | DISCHARGE
Start: 2022-01-01

## 2022-01-01 RX ORDER — IPRATROPIUM BROMIDE 0.2 MG/ML
2 SOLUTION, NON-ORAL INHALATION
Qty: 0 | Refills: 0 | DISCHARGE

## 2022-01-01 RX ORDER — MIRTAZAPINE 45 MG/1
7.5 TABLET, ORALLY DISINTEGRATING ORAL AT BEDTIME
Refills: 0 | Status: DISCONTINUED | OUTPATIENT
Start: 2022-01-01 | End: 2022-01-01

## 2022-01-01 RX ORDER — ALBUTEROL 90 UG/1
2.5 AEROSOL, METERED ORAL EVERY 4 HOURS
Refills: 0 | Status: DISCONTINUED | OUTPATIENT
Start: 2022-01-01 | End: 2022-01-01

## 2022-01-01 RX ORDER — SENNA PLUS 8.6 MG/1
2 TABLET ORAL
Qty: 0 | Refills: 0 | DISCHARGE

## 2022-01-01 RX ORDER — ROBINUL 0.2 MG/ML
0.2 INJECTION INTRAMUSCULAR; INTRAVENOUS EVERY 6 HOURS
Refills: 0 | Status: DISCONTINUED | OUTPATIENT
Start: 2022-01-01 | End: 2022-01-01

## 2022-01-01 RX ORDER — METOCLOPRAMIDE HCL 10 MG
5 TABLET ORAL
Refills: 0 | Status: DISCONTINUED | OUTPATIENT
Start: 2022-01-01 | End: 2022-01-01

## 2022-01-01 RX ORDER — SODIUM CHLORIDE 9 MG/ML
500 INJECTION INTRAMUSCULAR; INTRAVENOUS; SUBCUTANEOUS ONCE
Refills: 0 | Status: COMPLETED | OUTPATIENT
Start: 2022-01-01 | End: 2022-01-01

## 2022-01-01 RX ORDER — ACETAMINOPHEN 500 MG
2 TABLET ORAL
Qty: 0 | Refills: 0 | DISCHARGE

## 2022-01-01 RX ORDER — MEROPENEM 1 G/30ML
500 INJECTION INTRAVENOUS ONCE
Refills: 0 | Status: COMPLETED | OUTPATIENT
Start: 2022-01-01 | End: 2022-01-01

## 2022-01-01 RX ORDER — OXYBUTYNIN CHLORIDE 5 MG
5 TABLET ORAL
Refills: 0 | Status: DISCONTINUED | OUTPATIENT
Start: 2022-01-01 | End: 2022-01-01

## 2022-01-01 RX ORDER — HEPARIN SODIUM 5000 [USP'U]/ML
5000 INJECTION INTRAVENOUS; SUBCUTANEOUS EVERY 12 HOURS
Refills: 0 | Status: DISCONTINUED | OUTPATIENT
Start: 2022-01-01 | End: 2022-01-01

## 2022-01-01 RX ADMIN — HYDROMORPHONE HYDROCHLORIDE 0.5 MILLIGRAM(S): 2 INJECTION INTRAMUSCULAR; INTRAVENOUS; SUBCUTANEOUS at 18:19

## 2022-01-01 RX ADMIN — SENNA PLUS 2 TABLET(S): 8.6 TABLET ORAL at 21:48

## 2022-01-01 RX ADMIN — INSULIN HUMAN 10 UNIT(S): 100 INJECTION, SOLUTION SUBCUTANEOUS at 09:25

## 2022-01-01 RX ADMIN — PANTOPRAZOLE SODIUM 40 MILLIGRAM(S): 20 TABLET, DELAYED RELEASE ORAL at 06:19

## 2022-01-01 RX ADMIN — ALBUTEROL 2 PUFF(S): 90 AEROSOL, METERED ORAL at 08:26

## 2022-01-01 RX ADMIN — ROBINUL 0.4 MILLIGRAM(S): 0.2 INJECTION INTRAMUSCULAR; INTRAVENOUS at 10:24

## 2022-01-01 RX ADMIN — OXYCODONE HYDROCHLORIDE 2.5 MILLIGRAM(S): 5 TABLET ORAL at 06:40

## 2022-01-01 RX ADMIN — SENNA PLUS 2 TABLET(S): 8.6 TABLET ORAL at 22:07

## 2022-01-01 RX ADMIN — HYDROMORPHONE HYDROCHLORIDE 0.5 MILLIGRAM(S): 2 INJECTION INTRAMUSCULAR; INTRAVENOUS; SUBCUTANEOUS at 00:42

## 2022-01-01 RX ADMIN — ONDANSETRON 4 MILLIGRAM(S): 8 TABLET, FILM COATED ORAL at 17:58

## 2022-01-01 RX ADMIN — Medication 500 MILLIGRAM(S): at 06:43

## 2022-01-01 RX ADMIN — SODIUM CHLORIDE 80 MILLILITER(S): 9 INJECTION INTRAMUSCULAR; INTRAVENOUS; SUBCUTANEOUS at 06:22

## 2022-01-01 RX ADMIN — HEPARIN SODIUM 5000 UNIT(S): 5000 INJECTION INTRAVENOUS; SUBCUTANEOUS at 18:06

## 2022-01-01 RX ADMIN — Medication 325 MILLIGRAM(S): at 05:55

## 2022-01-01 RX ADMIN — HYDROMORPHONE HYDROCHLORIDE 0.5 MILLIGRAM(S): 2 INJECTION INTRAMUSCULAR; INTRAVENOUS; SUBCUTANEOUS at 10:25

## 2022-01-01 RX ADMIN — HEPARIN SODIUM 5000 UNIT(S): 5000 INJECTION INTRAVENOUS; SUBCUTANEOUS at 06:22

## 2022-01-01 RX ADMIN — ENOXAPARIN SODIUM 40 MILLIGRAM(S): 100 INJECTION SUBCUTANEOUS at 06:08

## 2022-01-01 RX ADMIN — MAGNESIUM HYDROXIDE 30 MILLILITER(S): 400 TABLET, CHEWABLE ORAL at 13:13

## 2022-01-01 RX ADMIN — HYDROMORPHONE HYDROCHLORIDE 0.5 MILLIGRAM(S): 2 INJECTION INTRAMUSCULAR; INTRAVENOUS; SUBCUTANEOUS at 13:35

## 2022-01-01 RX ADMIN — OXYCODONE HYDROCHLORIDE 2.5 MILLIGRAM(S): 5 TABLET ORAL at 22:11

## 2022-01-01 RX ADMIN — HYDROMORPHONE HYDROCHLORIDE 0.5 MILLIGRAM(S): 2 INJECTION INTRAMUSCULAR; INTRAVENOUS; SUBCUTANEOUS at 05:50

## 2022-01-01 RX ADMIN — POLYETHYLENE GLYCOL 3350 17 GRAM(S): 17 POWDER, FOR SOLUTION ORAL at 17:48

## 2022-01-01 RX ADMIN — HYDROMORPHONE HYDROCHLORIDE 0.5 MILLIGRAM(S): 2 INJECTION INTRAMUSCULAR; INTRAVENOUS; SUBCUTANEOUS at 10:30

## 2022-01-01 RX ADMIN — HYDROMORPHONE HYDROCHLORIDE 0.5 MILLIGRAM(S): 2 INJECTION INTRAMUSCULAR; INTRAVENOUS; SUBCUTANEOUS at 12:28

## 2022-01-01 RX ADMIN — ENOXAPARIN SODIUM 40 MILLIGRAM(S): 100 INJECTION SUBCUTANEOUS at 22:07

## 2022-01-01 RX ADMIN — Medication 133 MILLILITER(S): at 10:39

## 2022-01-01 RX ADMIN — Medication 500 MILLIGRAM(S): at 17:48

## 2022-01-01 RX ADMIN — Medication 10 MILLIGRAM(S): at 06:08

## 2022-01-01 RX ADMIN — PANTOPRAZOLE SODIUM 40 MILLIGRAM(S): 20 TABLET, DELAYED RELEASE ORAL at 05:40

## 2022-01-01 RX ADMIN — SODIUM CHLORIDE 75 MILLILITER(S): 9 INJECTION, SOLUTION INTRAVENOUS at 21:48

## 2022-01-01 RX ADMIN — Medication 500 MILLIGRAM(S): at 12:00

## 2022-01-01 RX ADMIN — ALBUTEROL 2.5 MILLIGRAM(S): 90 AEROSOL, METERED ORAL at 16:48

## 2022-01-01 RX ADMIN — Medication 1000 MILLIGRAM(S): at 05:37

## 2022-01-01 RX ADMIN — Medication 5 MILLIGRAM(S): at 05:46

## 2022-01-01 RX ADMIN — Medication 100 MILLIGRAM(S): at 11:48

## 2022-01-01 RX ADMIN — PANTOPRAZOLE SODIUM 40 MILLIGRAM(S): 20 TABLET, DELAYED RELEASE ORAL at 05:19

## 2022-01-01 RX ADMIN — SODIUM CHLORIDE 500 MILLILITER(S): 9 INJECTION INTRAMUSCULAR; INTRAVENOUS; SUBCUTANEOUS at 17:33

## 2022-01-01 RX ADMIN — Medication 100 MILLIGRAM(S): at 22:18

## 2022-01-01 RX ADMIN — Medication 1000 MILLIGRAM(S): at 22:20

## 2022-01-01 RX ADMIN — POLYETHYLENE GLYCOL 3350 17 GRAM(S): 17 POWDER, FOR SOLUTION ORAL at 09:07

## 2022-01-01 RX ADMIN — HYDROMORPHONE HYDROCHLORIDE 0.5 MILLIGRAM(S): 2 INJECTION INTRAMUSCULAR; INTRAVENOUS; SUBCUTANEOUS at 12:30

## 2022-01-01 RX ADMIN — HEPARIN SODIUM 5000 UNIT(S): 5000 INJECTION INTRAVENOUS; SUBCUTANEOUS at 13:29

## 2022-01-01 RX ADMIN — HYDROMORPHONE HYDROCHLORIDE 0.5 MILLIGRAM(S): 2 INJECTION INTRAMUSCULAR; INTRAVENOUS; SUBCUTANEOUS at 08:55

## 2022-01-01 RX ADMIN — SENNA PLUS 2 TABLET(S): 8.6 TABLET ORAL at 22:40

## 2022-01-01 RX ADMIN — HEPARIN SODIUM 5000 UNIT(S): 5000 INJECTION INTRAVENOUS; SUBCUTANEOUS at 21:15

## 2022-01-01 RX ADMIN — Medication 1000 MILLIGRAM(S): at 21:21

## 2022-01-01 RX ADMIN — PIPERACILLIN AND TAZOBACTAM 200 GRAM(S): 4; .5 INJECTION, POWDER, LYOPHILIZED, FOR SOLUTION INTRAVENOUS at 17:32

## 2022-01-01 RX ADMIN — Medication 100 MILLIGRAM(S): at 02:02

## 2022-01-01 RX ADMIN — INSULIN HUMAN 10 UNIT(S): 100 INJECTION, SOLUTION SUBCUTANEOUS at 16:31

## 2022-01-01 RX ADMIN — Medication 500 MILLIGRAM(S): at 13:26

## 2022-01-01 RX ADMIN — HYDROMORPHONE HYDROCHLORIDE 0.5 MILLIGRAM(S): 2 INJECTION INTRAMUSCULAR; INTRAVENOUS; SUBCUTANEOUS at 13:04

## 2022-01-01 RX ADMIN — PANTOPRAZOLE SODIUM 40 MILLIGRAM(S): 20 TABLET, DELAYED RELEASE ORAL at 06:10

## 2022-01-01 RX ADMIN — Medication 10 MILLIGRAM(S): at 05:56

## 2022-01-01 RX ADMIN — Medication 1000 MILLIGRAM(S): at 14:02

## 2022-01-01 RX ADMIN — HYDROMORPHONE HYDROCHLORIDE 0.5 MILLIGRAM(S): 2 INJECTION INTRAMUSCULAR; INTRAVENOUS; SUBCUTANEOUS at 03:52

## 2022-01-01 RX ADMIN — HYDROMORPHONE HYDROCHLORIDE 0.5 MILLIGRAM(S): 2 INJECTION INTRAMUSCULAR; INTRAVENOUS; SUBCUTANEOUS at 09:13

## 2022-01-01 RX ADMIN — MORPHINE SULFATE 1 MILLIGRAM(S): 50 CAPSULE, EXTENDED RELEASE ORAL at 10:20

## 2022-01-01 RX ADMIN — SENNA PLUS 2 TABLET(S): 8.6 TABLET ORAL at 21:13

## 2022-01-01 RX ADMIN — SODIUM CHLORIDE 80 MILLILITER(S): 9 INJECTION INTRAMUSCULAR; INTRAVENOUS; SUBCUTANEOUS at 22:18

## 2022-01-01 RX ADMIN — Medication 1000 MILLIGRAM(S): at 21:14

## 2022-01-01 RX ADMIN — POLYETHYLENE GLYCOL 3350 17 GRAM(S): 17 POWDER, FOR SOLUTION ORAL at 11:47

## 2022-01-01 RX ADMIN — ALBUTEROL 2 PUFF(S): 90 AEROSOL, METERED ORAL at 11:31

## 2022-01-01 RX ADMIN — Medication 500 MILLIGRAM(S): at 06:10

## 2022-01-01 RX ADMIN — Medication 1000 MILLIGRAM(S): at 13:33

## 2022-01-01 RX ADMIN — ROBINUL 0.2 MILLIGRAM(S): 0.2 INJECTION INTRAMUSCULAR; INTRAVENOUS at 00:55

## 2022-01-01 RX ADMIN — Medication 650 MILLIGRAM(S): at 16:52

## 2022-01-01 RX ADMIN — Medication 250 MILLIGRAM(S): at 19:59

## 2022-01-01 RX ADMIN — HYDROMORPHONE HYDROCHLORIDE 0.5 MILLIGRAM(S): 2 INJECTION INTRAMUSCULAR; INTRAVENOUS; SUBCUTANEOUS at 17:14

## 2022-01-01 RX ADMIN — MEROPENEM 100 MILLIGRAM(S): 1 INJECTION INTRAVENOUS at 23:54

## 2022-01-01 RX ADMIN — SENNA PLUS 2 TABLET(S): 8.6 TABLET ORAL at 21:52

## 2022-01-01 RX ADMIN — MORPHINE SULFATE 1 MILLIGRAM(S): 50 CAPSULE, EXTENDED RELEASE ORAL at 09:54

## 2022-01-01 RX ADMIN — OXYCODONE HYDROCHLORIDE 2.5 MILLIGRAM(S): 5 TABLET ORAL at 04:23

## 2022-01-01 RX ADMIN — HYDROMORPHONE HYDROCHLORIDE 0.5 MILLIGRAM(S): 2 INJECTION INTRAMUSCULAR; INTRAVENOUS; SUBCUTANEOUS at 01:24

## 2022-01-01 RX ADMIN — Medication 10 MILLIGRAM(S): at 18:48

## 2022-01-01 RX ADMIN — ROBINUL 0.4 MILLIGRAM(S): 0.2 INJECTION INTRAMUSCULAR; INTRAVENOUS at 23:45

## 2022-01-01 RX ADMIN — Medication 1000 MILLIGRAM(S): at 21:12

## 2022-01-01 RX ADMIN — Medication 1000 MILLIGRAM(S): at 06:00

## 2022-01-01 RX ADMIN — Medication 50 MILLILITER(S): at 01:44

## 2022-01-01 RX ADMIN — Medication 10 MILLIGRAM(S): at 05:38

## 2022-01-01 RX ADMIN — Medication 325 MILLIGRAM(S): at 11:46

## 2022-01-01 RX ADMIN — HYDROMORPHONE HYDROCHLORIDE 0.5 MILLIGRAM(S): 2 INJECTION INTRAMUSCULAR; INTRAVENOUS; SUBCUTANEOUS at 17:19

## 2022-01-01 RX ADMIN — Medication 1000 MILLIGRAM(S): at 06:30

## 2022-01-01 RX ADMIN — POLYETHYLENE GLYCOL 3350 17 GRAM(S): 17 POWDER, FOR SOLUTION ORAL at 12:32

## 2022-01-01 RX ADMIN — HYDROMORPHONE HYDROCHLORIDE 0.5 MILLIGRAM(S): 2 INJECTION INTRAMUSCULAR; INTRAVENOUS; SUBCUTANEOUS at 00:12

## 2022-01-01 RX ADMIN — Medication 325 MILLIGRAM(S): at 17:09

## 2022-01-01 RX ADMIN — Medication 5 MILLIGRAM(S): at 11:57

## 2022-01-01 RX ADMIN — HYDROMORPHONE HYDROCHLORIDE 0.5 MILLIGRAM(S): 2 INJECTION INTRAMUSCULAR; INTRAVENOUS; SUBCUTANEOUS at 07:02

## 2022-01-01 RX ADMIN — POLYETHYLENE GLYCOL 3350 17 GRAM(S): 17 POWDER, FOR SOLUTION ORAL at 15:11

## 2022-01-01 RX ADMIN — Medication 200 GRAM(S): at 09:25

## 2022-01-01 RX ADMIN — HEPARIN SODIUM 5000 UNIT(S): 5000 INJECTION INTRAVENOUS; SUBCUTANEOUS at 10:56

## 2022-01-01 RX ADMIN — POLYETHYLENE GLYCOL 3350 17 GRAM(S): 17 POWDER, FOR SOLUTION ORAL at 14:57

## 2022-01-01 RX ADMIN — INSULIN HUMAN 10 UNIT(S): 100 INJECTION, SOLUTION SUBCUTANEOUS at 01:44

## 2022-01-01 RX ADMIN — HYDROMORPHONE HYDROCHLORIDE 0.5 MILLIGRAM(S): 2 INJECTION INTRAMUSCULAR; INTRAVENOUS; SUBCUTANEOUS at 08:25

## 2022-01-01 RX ADMIN — ENOXAPARIN SODIUM 40 MILLIGRAM(S): 100 INJECTION SUBCUTANEOUS at 05:46

## 2022-01-01 RX ADMIN — PANTOPRAZOLE SODIUM 40 MILLIGRAM(S): 20 TABLET, DELAYED RELEASE ORAL at 05:10

## 2022-01-01 RX ADMIN — HYDROMORPHONE HYDROCHLORIDE 0.5 MILLIGRAM(S): 2 INJECTION INTRAMUSCULAR; INTRAVENOUS; SUBCUTANEOUS at 21:47

## 2022-01-01 RX ADMIN — ENOXAPARIN SODIUM 40 MILLIGRAM(S): 100 INJECTION SUBCUTANEOUS at 05:37

## 2022-01-01 RX ADMIN — HYDROMORPHONE HYDROCHLORIDE 0.5 MILLIGRAM(S): 2 INJECTION INTRAMUSCULAR; INTRAVENOUS; SUBCUTANEOUS at 23:58

## 2022-01-01 RX ADMIN — HYDROMORPHONE HYDROCHLORIDE 0.5 MILLIGRAM(S): 2 INJECTION INTRAMUSCULAR; INTRAVENOUS; SUBCUTANEOUS at 00:54

## 2022-01-01 RX ADMIN — Medication 500 MILLIGRAM(S): at 12:30

## 2022-01-01 RX ADMIN — SODIUM CHLORIDE 1000 MILLILITER(S): 9 INJECTION INTRAMUSCULAR; INTRAVENOUS; SUBCUTANEOUS at 16:47

## 2022-01-01 RX ADMIN — HYDROMORPHONE HYDROCHLORIDE 0.5 MILLIGRAM(S): 2 INJECTION INTRAMUSCULAR; INTRAVENOUS; SUBCUTANEOUS at 02:12

## 2022-01-01 RX ADMIN — ROBINUL 0.4 MILLIGRAM(S): 0.2 INJECTION INTRAMUSCULAR; INTRAVENOUS at 05:35

## 2022-01-01 RX ADMIN — RASBURICASE 108 MILLIGRAM(S): KIT at 11:18

## 2022-01-01 RX ADMIN — Medication 1000 MILLIGRAM(S): at 13:54

## 2022-01-01 RX ADMIN — Medication 5 MILLIGRAM(S): at 14:56

## 2022-01-01 RX ADMIN — ROBINUL 0.4 MILLIGRAM(S): 0.2 INJECTION INTRAMUSCULAR; INTRAVENOUS at 12:39

## 2022-01-01 RX ADMIN — Medication 10 MILLIGRAM(S): at 21:24

## 2022-01-01 RX ADMIN — Medication 50 MILLILITER(S): at 16:31

## 2022-01-01 RX ADMIN — POLYETHYLENE GLYCOL 3350 17 GRAM(S): 17 POWDER, FOR SOLUTION ORAL at 08:58

## 2022-01-01 RX ADMIN — SODIUM CHLORIDE 75 MILLILITER(S): 9 INJECTION, SOLUTION INTRAVENOUS at 00:12

## 2022-01-01 RX ADMIN — POLYETHYLENE GLYCOL 3350 17 GRAM(S): 17 POWDER, FOR SOLUTION ORAL at 13:26

## 2022-01-01 RX ADMIN — PANTOPRAZOLE SODIUM 40 MILLIGRAM(S): 20 TABLET, DELAYED RELEASE ORAL at 06:00

## 2022-01-01 RX ADMIN — ENOXAPARIN SODIUM 40 MILLIGRAM(S): 100 INJECTION SUBCUTANEOUS at 06:43

## 2022-01-01 RX ADMIN — HYDROMORPHONE HYDROCHLORIDE 0.5 MILLIGRAM(S): 2 INJECTION INTRAMUSCULAR; INTRAVENOUS; SUBCUTANEOUS at 05:34

## 2022-01-01 RX ADMIN — ENOXAPARIN SODIUM 40 MILLIGRAM(S): 100 INJECTION SUBCUTANEOUS at 05:32

## 2022-01-01 RX ADMIN — POLYETHYLENE GLYCOL 3350 17 GRAM(S): 17 POWDER, FOR SOLUTION ORAL at 09:52

## 2022-01-01 RX ADMIN — Medication 100 MILLIGRAM(S): at 18:22

## 2022-01-01 RX ADMIN — ENOXAPARIN SODIUM 40 MILLIGRAM(S): 100 INJECTION SUBCUTANEOUS at 21:26

## 2022-01-01 RX ADMIN — SENNA PLUS 2 TABLET(S): 8.6 TABLET ORAL at 21:12

## 2022-01-01 RX ADMIN — ALBUTEROL 2.5 MILLIGRAM(S): 90 AEROSOL, METERED ORAL at 16:34

## 2022-01-01 RX ADMIN — Medication 325 MILLIGRAM(S): at 05:38

## 2022-01-01 RX ADMIN — Medication 10 MILLIGRAM(S): at 22:40

## 2022-01-01 RX ADMIN — Medication 1000 MILLIGRAM(S): at 05:10

## 2022-01-01 RX ADMIN — ENOXAPARIN SODIUM 40 MILLIGRAM(S): 100 INJECTION SUBCUTANEOUS at 06:18

## 2022-01-01 RX ADMIN — Medication 10 MILLIGRAM(S): at 17:37

## 2022-01-01 RX ADMIN — ROBINUL 0.4 MILLIGRAM(S): 0.2 INJECTION INTRAMUSCULAR; INTRAVENOUS at 17:33

## 2022-01-01 RX ADMIN — Medication 500 MILLIGRAM(S): at 05:46

## 2022-01-01 RX ADMIN — Medication 10 MILLIGRAM(S): at 05:37

## 2022-01-01 RX ADMIN — Medication 100 MILLIGRAM(S): at 14:56

## 2022-01-01 RX ADMIN — HYDROMORPHONE HYDROCHLORIDE 0.5 MILLIGRAM(S): 2 INJECTION INTRAMUSCULAR; INTRAVENOUS; SUBCUTANEOUS at 20:03

## 2022-01-01 RX ADMIN — HYDROMORPHONE HYDROCHLORIDE 0.5 MILLIGRAM(S): 2 INJECTION INTRAMUSCULAR; INTRAVENOUS; SUBCUTANEOUS at 22:17

## 2022-01-01 RX ADMIN — ALBUTEROL 2.5 MILLIGRAM(S): 90 AEROSOL, METERED ORAL at 17:32

## 2022-01-01 RX ADMIN — LACTULOSE 10 GRAM(S): 10 SOLUTION ORAL at 13:27

## 2022-01-01 RX ADMIN — Medication 1000 MILLIGRAM(S): at 06:39

## 2022-01-01 RX ADMIN — Medication 1 MILLIGRAM(S): at 02:12

## 2022-01-01 RX ADMIN — CEFTRIAXONE 100 MILLIGRAM(S): 500 INJECTION, POWDER, FOR SOLUTION INTRAMUSCULAR; INTRAVENOUS at 20:06

## 2022-01-01 RX ADMIN — Medication 50 MILLILITER(S): at 09:25

## 2022-01-01 RX ADMIN — MIRTAZAPINE 7.5 MILLIGRAM(S): 45 TABLET, ORALLY DISINTEGRATING ORAL at 21:52

## 2022-01-01 RX ADMIN — HEPARIN SODIUM 5000 UNIT(S): 5000 INJECTION INTRAVENOUS; SUBCUTANEOUS at 17:12

## 2022-01-01 RX ADMIN — SENNA PLUS 2 TABLET(S): 8.6 TABLET ORAL at 21:41

## 2022-01-01 RX ADMIN — CEFTRIAXONE 100 MILLIGRAM(S): 500 INJECTION, POWDER, FOR SOLUTION INTRAMUSCULAR; INTRAVENOUS at 18:05

## 2022-01-01 RX ADMIN — HYDROMORPHONE HYDROCHLORIDE 0.5 MILLIGRAM(S): 2 INJECTION INTRAMUSCULAR; INTRAVENOUS; SUBCUTANEOUS at 10:20

## 2022-01-01 RX ADMIN — HYDROMORPHONE HYDROCHLORIDE 0.5 MILLIGRAM(S): 2 INJECTION INTRAMUSCULAR; INTRAVENOUS; SUBCUTANEOUS at 02:38

## 2022-01-01 RX ADMIN — HYDROMORPHONE HYDROCHLORIDE 0.5 MILLIGRAM(S): 2 INJECTION INTRAMUSCULAR; INTRAVENOUS; SUBCUTANEOUS at 20:20

## 2022-01-01 RX ADMIN — ROBINUL 0.2 MILLIGRAM(S): 0.2 INJECTION INTRAMUSCULAR; INTRAVENOUS at 17:46

## 2022-01-01 RX ADMIN — ENOXAPARIN SODIUM 40 MILLIGRAM(S): 100 INJECTION SUBCUTANEOUS at 06:10

## 2022-01-01 RX ADMIN — PANTOPRAZOLE SODIUM 40 MILLIGRAM(S): 20 TABLET, DELAYED RELEASE ORAL at 06:44

## 2022-01-01 RX ADMIN — HYDROMORPHONE HYDROCHLORIDE 0.5 MILLIGRAM(S): 2 INJECTION INTRAMUSCULAR; INTRAVENOUS; SUBCUTANEOUS at 09:15

## 2022-01-01 RX ADMIN — PANTOPRAZOLE SODIUM 40 MILLIGRAM(S): 20 TABLET, DELAYED RELEASE ORAL at 05:11

## 2022-01-01 RX ADMIN — PANTOPRAZOLE SODIUM 40 MILLIGRAM(S): 20 TABLET, DELAYED RELEASE ORAL at 06:18

## 2022-01-01 RX ADMIN — Medication 500 MILLIGRAM(S): at 06:08

## 2022-01-01 RX ADMIN — HYDROMORPHONE HYDROCHLORIDE 0.5 MILLIGRAM(S): 2 INJECTION INTRAMUSCULAR; INTRAVENOUS; SUBCUTANEOUS at 22:33

## 2022-01-01 RX ADMIN — Medication 100 MILLIGRAM(S): at 05:33

## 2022-01-01 RX ADMIN — Medication 100 MILLIGRAM(S): at 22:12

## 2022-01-01 RX ADMIN — Medication 325 MILLIGRAM(S): at 21:22

## 2022-01-01 RX ADMIN — Medication 10 MILLIGRAM(S): at 21:48

## 2022-01-01 RX ADMIN — ALBUTEROL 2.5 MILLIGRAM(S): 90 AEROSOL, METERED ORAL at 13:47

## 2022-01-01 RX ADMIN — ONDANSETRON 4 MILLIGRAM(S): 8 TABLET, FILM COATED ORAL at 05:11

## 2022-01-01 RX ADMIN — HYDROMORPHONE HYDROCHLORIDE 0.5 MILLIGRAM(S): 2 INJECTION INTRAMUSCULAR; INTRAVENOUS; SUBCUTANEOUS at 09:49

## 2022-01-01 RX ADMIN — Medication 5 MILLIGRAM(S): at 17:48

## 2022-01-01 RX ADMIN — Medication 1000 MILLIGRAM(S): at 22:55

## 2022-01-01 RX ADMIN — PANTOPRAZOLE SODIUM 40 MILLIGRAM(S): 20 TABLET, DELAYED RELEASE ORAL at 06:20

## 2022-01-01 RX ADMIN — Medication 1000 MILLIGRAM(S): at 05:27

## 2022-01-01 RX ADMIN — Medication 500 MILLIGRAM(S): at 00:04

## 2022-01-01 RX ADMIN — ENOXAPARIN SODIUM 40 MILLIGRAM(S): 100 INJECTION SUBCUTANEOUS at 05:54

## 2022-01-01 RX ADMIN — Medication 650 MILLIGRAM(S): at 19:11

## 2022-01-01 RX ADMIN — OXYCODONE HYDROCHLORIDE 2.5 MILLIGRAM(S): 5 TABLET ORAL at 21:11

## 2022-01-01 RX ADMIN — HYDROMORPHONE HYDROCHLORIDE 0.5 MILLIGRAM(S): 2 INJECTION INTRAMUSCULAR; INTRAVENOUS; SUBCUTANEOUS at 18:21

## 2022-01-01 RX ADMIN — Medication 10 MILLIGRAM(S): at 18:55

## 2022-01-01 RX ADMIN — Medication 10 MILLIGRAM(S): at 17:23

## 2022-01-01 RX ADMIN — MAGNESIUM HYDROXIDE 30 MILLILITER(S): 400 TABLET, CHEWABLE ORAL at 13:54

## 2022-01-01 RX ADMIN — Medication 325 MILLIGRAM(S): at 18:48

## 2022-01-01 RX ADMIN — Medication 500 MILLIGRAM(S): at 19:02

## 2022-01-01 RX ADMIN — Medication 500 MILLIGRAM(S): at 23:47

## 2022-01-01 RX ADMIN — POLYETHYLENE GLYCOL 3350 17 GRAM(S): 17 POWDER, FOR SOLUTION ORAL at 11:35

## 2022-01-01 RX ADMIN — CEFTRIAXONE 100 MILLIGRAM(S): 500 INJECTION, POWDER, FOR SOLUTION INTRAMUSCULAR; INTRAVENOUS at 17:32

## 2022-01-01 RX ADMIN — HYDROMORPHONE HYDROCHLORIDE 0.5 MILLIGRAM(S): 2 INJECTION INTRAMUSCULAR; INTRAVENOUS; SUBCUTANEOUS at 17:44

## 2022-01-01 RX ADMIN — HYDROMORPHONE HYDROCHLORIDE 0.5 MILLIGRAM(S): 2 INJECTION INTRAMUSCULAR; INTRAVENOUS; SUBCUTANEOUS at 14:05

## 2022-01-01 RX ADMIN — HYDROMORPHONE HYDROCHLORIDE 0.5 MILLIGRAM(S): 2 INJECTION INTRAMUSCULAR; INTRAVENOUS; SUBCUTANEOUS at 23:43

## 2022-01-01 RX ADMIN — Medication 500 MILLIGRAM(S): at 17:36

## 2022-01-01 RX ADMIN — Medication 500 MILLIGRAM(S): at 00:19

## 2022-01-01 RX ADMIN — HYDROMORPHONE HYDROCHLORIDE 0.5 MILLIGRAM(S): 2 INJECTION INTRAMUSCULAR; INTRAVENOUS; SUBCUTANEOUS at 04:22

## 2022-01-01 RX ADMIN — ROBINUL 0.2 MILLIGRAM(S): 0.2 INJECTION INTRAMUSCULAR; INTRAVENOUS at 11:32

## 2022-01-01 RX ADMIN — Medication 1000 MILLIGRAM(S): at 13:32

## 2022-01-01 RX ADMIN — Medication 10 MILLIGRAM(S): at 19:03

## 2022-01-01 RX ADMIN — HYDROMORPHONE HYDROCHLORIDE 0.5 MILLIGRAM(S): 2 INJECTION INTRAMUSCULAR; INTRAVENOUS; SUBCUTANEOUS at 22:03

## 2022-01-01 RX ADMIN — Medication 100 MILLIGRAM(S): at 06:18

## 2022-01-01 RX ADMIN — Medication 500 MILLIGRAM(S): at 11:54

## 2022-01-01 RX ADMIN — SODIUM ZIRCONIUM CYCLOSILICATE 10 GRAM(S): 10 POWDER, FOR SUSPENSION ORAL at 13:31

## 2022-01-01 RX ADMIN — HYDROMORPHONE HYDROCHLORIDE 0.5 MILLIGRAM(S): 2 INJECTION INTRAMUSCULAR; INTRAVENOUS; SUBCUTANEOUS at 16:41

## 2022-01-01 RX ADMIN — Medication 500 MILLIGRAM(S): at 01:01

## 2022-01-01 RX ADMIN — ROBINUL 0.4 MILLIGRAM(S): 0.2 INJECTION INTRAMUSCULAR; INTRAVENOUS at 23:53

## 2022-01-01 RX ADMIN — HYDROMORPHONE HYDROCHLORIDE 0.5 MILLIGRAM(S): 2 INJECTION INTRAMUSCULAR; INTRAVENOUS; SUBCUTANEOUS at 07:32

## 2022-01-01 RX ADMIN — HEPARIN SODIUM 5000 UNIT(S): 5000 INJECTION INTRAVENOUS; SUBCUTANEOUS at 21:16

## 2022-01-01 RX ADMIN — POLYETHYLENE GLYCOL 3350 17 GRAM(S): 17 POWDER, FOR SOLUTION ORAL at 12:30

## 2022-01-01 RX ADMIN — Medication 1000 MILLIGRAM(S): at 16:53

## 2022-01-01 RX ADMIN — ROBINUL 0.2 MILLIGRAM(S): 0.2 INJECTION INTRAMUSCULAR; INTRAVENOUS at 07:02

## 2022-01-01 RX ADMIN — SODIUM CHLORIDE 70 MILLILITER(S): 9 INJECTION INTRAMUSCULAR; INTRAVENOUS; SUBCUTANEOUS at 11:18

## 2022-01-01 RX ADMIN — HYDROMORPHONE HYDROCHLORIDE 0.5 MG/HR: 2 INJECTION INTRAMUSCULAR; INTRAVENOUS; SUBCUTANEOUS at 11:33

## 2022-01-01 RX ADMIN — PANTOPRAZOLE SODIUM 40 MILLIGRAM(S): 20 TABLET, DELAYED RELEASE ORAL at 05:27

## 2022-01-12 NOTE — ASSESSMENT
[FreeTextEntry1] : Ms. Gutierrez is a very pleasant 89 year old woman here today for evaluation of gross hematuria and possible UTIs.\par She reports that for the past 2 years she has had "cloudy urine" and sometimes pink tinged.\par She reports that additionally that she has increased urgency and frequency.\par She also reports nocturnal enuresis if she is not able to stay awake.\par Records show Urgent Care visit for UTIs 5 times this year.\par She denies any dysuria or pain.\par She was given oxybutynin by Dr. Mariano Rojas, urology, with [facility], but reports it was not helpful.\par Denies personal or family history of kidney stones.\par Denies familial history of urological cancers.\par Denies history of smoking.\par Stop Ditropan as this has been ineffective\par Urine culture\par Urinalysis deferred as patient did not give a sufficient sample for urinalysis. We will check urinalysis at next visit\par Start Bactrim BID for 7 days given concern for urinary tract infection\par \par -UA\par -UCx\par -urine cytology \par -CT Urogram, patient agreed \par -cystoscopy- patient refusing\par -continue ditropan\par -Extensive discussion of the potential etiologies of hematuria, as well as the recommendation to complete full work up for evaluation of cancer or other  sources of hematuria, the patient wishes to defer this at this time\par Follow-up in 2 weeks

## 2022-01-12 NOTE — HISTORY OF PRESENT ILLNESS
[FreeTextEntry1] : Ms. Gutierrez is a very pleasant 89 year old woman here today for evaluation of hematuria and urinary frequency.\par She reports that for the past 2 years she has had "cloudy urine" and sometimes pink tinged.\par She reports that additionally that she has increased urgency and frequency.\par She also reports nocturnal enuresis if she is not able to stay awake.\par Records show Urgent Care visit for UTIs 5 times this year.\par She denies any dysuria or pain.\par She was given oxybutynin by Dr. Mariano Rojas, urology, with [facility], but reports it was not helpful.\par Denies personal or family history of kidney stones.\par Denies familial history of urological cancers.\par Denies history of smoking.\par \par On previous visit, UA with gross hematuria, >720 RBC and WBC. Sample not enough quantity for cytology. Refused CT urogram at that time. \par \par Continues to report bleeding when wiping but does not see blood in urine. Urine sample in the office grossly hematuric. Denies pain, dysuria, fevers. Continues to wake up every 2 hours at night to urinate. Does not see benefit with change from vesicare to oxybutynin.

## 2022-02-01 PROBLEM — R35.0 INCREASED FREQUENCY OF URINATION: Status: ACTIVE | Noted: 2021-01-01

## 2022-02-01 PROBLEM — R31.0 GROSS HEMATURIA: Status: ACTIVE | Noted: 2021-01-01

## 2022-02-01 PROBLEM — N39.0 URINARY TRACT INFECTION: Status: ACTIVE | Noted: 2021-01-01

## 2022-02-01 NOTE — HISTORY OF PRESENT ILLNESS
[FreeTextEntry1] : 89-year-old woman who presents for follow-up of gross hematuria, urinary frequency, UTI..  She was scheduled for a cystoscopy today, however she reports that she is unwilling to proceed with the examination.  She reports "I do not have bladder cancer".  She reports that she no longer sees blood in her urine.  She denies dysuria.  She is completing a course of Bactrim.  She reports that solifenacin improves her urinary symptoms.  She is unwilling to undergo evaluation for gross hematuria.

## 2022-02-01 NOTE — ASSESSMENT
[FreeTextEntry1] : 89-year-old woman who presents for follow-up of urinary frequency, urinary urgency, gross hematuria, UTI\par -Prior urinalysis demonstrates 98 red blood cells per high-powered field, 62 white blood cells per high-powered field, moderate leukocyte esterase\par -Urine culture demonstrates Klebsiella urinary tract infection sensitive to Bactrim which she is completing\par -Discussed I am very concerned about the possibility of a urologic malignancy given gross hematuria.  She is unwilling to entertain the possibility of malignancy.  She is also unwilling to undergo an evaluation for etiology of gross hematuria.  She understands the risks of this and verbalized this understanding\par -Complete course of Bactrim\par -Continue solifenacin–refill sent to the pharmacy\par -Follow-up in 3 months with telehealth visit per patient request

## 2022-02-02 NOTE — ED ADULT NURSE REASSESSMENT NOTE - NS ED NURSE REASSESS COMMENT FT1
Pt for transport to floor via stretcher with transport, A*Ox3, verbalizes understanding of continuity of care. jalloh flushed as per MD verbal order. Report given to RN Ne.

## 2022-02-02 NOTE — ED ADULT NURSE NOTE - OBJECTIVE STATEMENT
pt presents to ed for fall last night while attempting to use the restroom. pt neighbor at bedside. pt denies hitting head, loosing consciousness.

## 2022-02-02 NOTE — H&P ADULT - NSHPREVIEWOFSYSTEMS_GEN_ALL_CORE
CONSTITUTIONAL: No weakness, fevers or chills, no weight loss   EYES/ENT: No visual changes;  No vertigo or throat pain   NECK: No pain or stiffness  RESPIRATORY: No cough, wheezing, hemoptysis; No shortness of breath  CARDIOVASCULAR: No chest pain or palpitations  GASTROINTESTINAL: No abdominal or epigastric pain. No nausea, vomiting, or hematemesis; No diarrhea or constipation. No melena or hematochezia.  GENITOURINARY: No discharge, hematuria  NEUROLOGICAL: No numbness or weakness  All other review of systems is negative unless indicated above.

## 2022-02-02 NOTE — H&P ADULT - PROBLEM SELECTOR PLAN 1
Vitals stable  Physical exam Tenderness and LROM in left elbow and left hip  Orthopedics consulted  Will likely get an ORIF Left olecranon and Left hip IM nailing  Pt is NPO from midnight  started on IVF NS @80cc  ordered Coags, type and screen  Pain control with PRN morphine, oxycodone, tylenol

## 2022-02-02 NOTE — CHART NOTE - NSCHARTNOTEFT_GEN_A_CORE
EVENT:     OBJECTIVE:  Vital Signs Last 24 Hrs  T(C): 36.7 (02 Feb 2022 21:10), Max: 36.7 (02 Feb 2022 10:12)  T(F): 98.1 (02 Feb 2022 21:10), Max: 98.1 (02 Feb 2022 21:10)  HR: 83 (02 Feb 2022 21:10) (74 - 91)  BP: 118/61 (02 Feb 2022 21:10) (118/61 - 131/70)  BP(mean): --  RR: 18 (02 Feb 2022 21:10) (17 - 18)  SpO2: 97% (02 Feb 2022 21:10) (95% - 97%)    FOCUSED PHYSICAL EXAM:    LABS:                        10.3   25.91 )-----------( 305      ( 02 Feb 2022 22:13 )             31.2     02-02    138  |  107  |  32<H>  ----------------------------<  106<H>  5.0   |  24  |  0.96    Ca    8.9      02 Feb 2022 12:27  Mg     2.3     02-02    TPro  5.7<L>  /  Alb  2.6<L>  /  TBili  0.5  /  DBili  x   /  AST  37  /  ALT  31  /  AlkPhos  82  02-02      EKG:   IMGAGING:    ASSESSMENT:  HPI:  Pt is a 89 yo F lives alone at home, independent, ambulates sometimes with a rolator, baseline AAOx2-3 with PMH of Urinary incontinence, HTN and PSH of left mastectomy (left breast CA 30 yrs ago) presents to the ED after she had a fall this morning at home. Pt was going to the bathroom around 3am and on the way tripped between her legs and fell down. She couldn't stand up after the incident and called for help. She did not hit her head on the floor or lose consciousness. Her neighbor heard her around 7am and helped her to get up and called EMS to come to the hospital. No h/o fever, headache, dizziness, abdominal pain, N/V. Pt was prescribed yesterday Solifenacin for urinary incontinence. No recent travel/illness.  (02 Feb 2022 18:03)      PLAN:     FOLLOW UP / RESULT: EVENT: Poor IV access, multiple failed attempt, ICU contacted    BRIEF HPI:  89 yo F lives alone at home, independent, ambulates sometimes with a rolator, baseline AAOx2-3 with PMH of Urinary incontinence, HTN and PSH of left mastectomy (left breast CA 30 yrs ago) presents to the ED after she had a fall this morning at home. Consent for blood and IV contrast from emergency contact Summer Parveen [phone] placed on chart.    OBJECTIVE:  Vital Signs Last 24 Hrs  T(C): 36.7 (02 Feb 2022 21:10), Max: 36.7 (02 Feb 2022 10:12)  T(F): 98.1 (02 Feb 2022 21:10), Max: 98.1 (02 Feb 2022 21:10)  HR: 83 (02 Feb 2022 21:10) (74 - 91)  BP: 118/61 (02 Feb 2022 21:10) (118/61 - 131/70)  BP(mean): --  RR: 18 (02 Feb 2022 21:10) (17 - 18)  SpO2: 97% (02 Feb 2022 21:10) (95% - 97%)    FOCUSED PHYSICAL EXAM:  EXT: Elastic dsg to rt arm, with sling, pedal pulses 2+  NEURO: Alert, conversant, oriented X 2  RESP: Even, unlabored  CV: S1 S2  : Verduzco, mild hematuria    LABS:                        10.3   25.91 )-----------( 305      ( 02 Feb 2022 22:13 )             31.2     02-02    138  |  107  |  32<H>  ----------------------------<  106<H>  5.0   |  24  |  0.96    Ca    8.9      02 Feb 2022 12:27  Mg     2.3     02-02    TPro  5.7<L>  /  Alb  2.6<L>  /  TBili  0.5  /  DBili  x   /  AST  37  /  ALT  31  /  AlkPhos  82  02-02    IMAGING: Acute comminuted displaced fracture of the left intratrochanteric femur.  ACC: [id number] EXAM:  CT PELVIS BONY ONLY                        PROCEDURE DATE:  02/02/2022    Acute comminuted displaced fracture of the left intratrochanteric femur.  Large lobulated soft tissue density in curvilinear mineralization   expected region of the urinary bladder/uterus which raises likely related   to neoplasm. Verduzco catheter is in place with the balloon displaced to the   left by the soft tissue lesion. The tip of the Verduzco catheter tents the   urinary bladder at the left pelvic sidewall. Dedicated CT of the abdomen   and pelvis is recommended. Findings discussed with Dr. Tomlinson.    PROBLEM:  Acute comminuted displaced fracture of the left intratrochanteric femur, dedicated CT of the abdomen and pelvis is recommended  PLAN:   1. CT abdomen and pelvis with contrast pending.    FOLLOW UP / RESULT: IV access  ADDENDUM:   RT A/C access obtained by ICU. CT informed. Consent on chart.

## 2022-02-02 NOTE — PROGRESS NOTE ADULT - SUBJECTIVE AND OBJECTIVE BOX
89 yo F with left hip fracture, left olecranon fracture    AAOx3 pleasant female  no family, siblings or children available  patient designates her neighbor, Summer Saini, as per emergency contact for medical decisions, in case of mental incapacity.    Full code    no advanced directives

## 2022-02-02 NOTE — H&P ADULT - PROBLEM SELECTOR PLAN 3
Pt had a jalloh catheter placed in ED  Has gross hematuria  Pelvic Xray suspicious for a pelvic mass  Has history of left breast CA 30 yrs ago and had mastectomy  Urology consulted  ordered CT abdomen and pelvis with IV contrast  follow up CBC Q12h

## 2022-02-02 NOTE — ED ADULT TRIAGE NOTE - CHIEF COMPLAINT QUOTE
BIBA for unwitnessed fall in the bathroom around 3AM. Found by neighbor around 9 Am this morning. c/o of right elbow pain. No LOC. Unsure if she hit her head. No on AC

## 2022-02-02 NOTE — ED PROVIDER NOTE - CLINICAL SUMMARY MEDICAL DECISION MAKING FREE TEXT BOX
89 year old female with a mechanical fall with generalized weakness and a left elbow injury. Suspicion for underlying infection. Will perform labs, X-rays of the elbow, and reassess. 89 year old female with a mechanical fall with generalized weakness and a left elbow injury. Suspicion for underlying infection, elbow fracture. Will perform labs, X-rays of the elbow, and reassess.

## 2022-02-02 NOTE — ED PROVIDER NOTE - OBJECTIVE STATEMENT
89 year old female with PMHx of urinary frequency presents to the ED with complaints of a fall this morning. Patient states that she woke up at 03:00 this morning to use the bathroom and felt her legs give away, causing her to fall. Patient states that she was then unable to get back up due to weakness. Patient's neighbor, Summer, reportedly heard her yelling and called EMS. Upon EMS arrival, they were unable to have patient stand due to weakness. Patient is now complaining of pain to her left elbow. Patient states that she has been on medications through her urologist for the past week. Patient otherwise denies any fever, vomiting, shortness of breath, chest pain, changes in appetite, head injuries, and all other acute complaints. NKDA.

## 2022-02-02 NOTE — CONSULT NOTE ADULT - SUBJECTIVE AND OBJECTIVE BOX
HPI:  Pt is a 89 yo F lives alone at home, independent, ambulates sometimes with a rolator, baseline AAOx2-3 with PMH of Urinary incontinence, HTN and PSH of left mastectomy (left breast CA 30 yrs ago) presents to the ED after she had a fall this morning at home. Pt was going to the bathroom around 3am and on the way tripped between her legs and fell down. She couldn't stand up after the incident and called for help. She did not hit her head on the floor or lose consciousness. Her neighbor heard her around 7am and helped her to get up and called EMS to come to the hospital. No h/o fever, headache, dizziness, abdominal pain, N/V. Pt was prescribed yesterday Solifenacin for urinary incontinence. No recent travel/illness.  (02 Feb 2022 18:03)    < from: CT Pelvis Bony Only No Cont (02.02.22 @ 18:46) >  IMPRESSION:    Acute comminuted displaced fracture of the left intratrochanteric femur.    Large lobulated soft tissue density in curvilinear mineralization   expected region of the urinary bladder/uterus which raises likely related   to neoplasm. Jalloh catheter is in place with the balloon displaced to the   left by the soft tissue lesion. The tip of the Jalloh catheter tents the   urinary bladder at the left pelvic sidewall. Dedicated CT of the abdomen   and pelvis is recommended. Findings discussed with Dr. Tomlinson.    < end of copied text >    Orthopedics consulted for L hip fx  primary team called urology consult for hematuria and CT question of bladder mass  CT was done without contrast    jalloh cath was flushed in ED and cleared some    PAST MEDICAL & SURGICAL HISTORY:  Urinary frequency    Hypertension        Vital Signs Last 24 Hrs  T(C): 36.6 (02 Feb 2022 19:42), Max: 36.7 (02 Feb 2022 10:12)  T(F): 97.9 (02 Feb 2022 19:42), Max: 98 (02 Feb 2022 10:12)  HR: 85 (02 Feb 2022 19:42) (74 - 91)  BP: 118/66 (02 Feb 2022 19:42) (118/66 - 131/70)  BP(mean): --  RR: 17 (02 Feb 2022 19:42) (17 - 17)  SpO2: 95% (02 Feb 2022 19:42) (95% - 97%)                          10.7   32.09 )-----------( 316      ( 02 Feb 2022 12:27 )             32.4     02-02    138  |  107  |  32<H>  ----------------------------<  106<H>  5.0   |  24  |  0.96    Ca    8.9      02 Feb 2022 12:27  Mg     2.3     02-02    TPro  5.7<L>  /  Alb  2.6<L>  /  TBili  0.5  /  DBili  x   /  AST  37  /  ALT  31  /  AlkPhos  82  02-02        PHYSICAL EXAM  General: WN/WD NAD  Neurology: awake, alert, nonfocal  Respiratory: CTA B/L  CV: S1S2  Abdominal: Soft, NT, ND   Extremities: No edema, + peripheral pulses  jalloh urine bloody but no clots      ASSESSMENT/ PLAN:  case/CT findings discussed with Dr Dunham  cont med mgmt/clearance  flush jalloh prn, trend H&H  may need CBI if continues bloody or clotting  hip fx mgmt per ortho  rec repeat CT with iv contrast   HPI:  Pt is a 89 yo F lives alone at home, independent, ambulates sometimes with a rolator, baseline AAOx2-3 with PMH of Urinary incontinence, HTN and PSH of left mastectomy (left breast CA 30 yrs ago) presents to the ED after she had a fall at home. Pt was going to the bathroom around 3am and on the way tripped between her legs and fell down. She couldn't stand up after the incident and called for help. She did not hit her head on the floor or lose consciousness. Her neighbor heard her around 7am and helped her to get up and called EMS to come to the hospital. No h/o fever, headache, dizziness, abdominal pain, N/V. No recent travel/illness. No specific timing to her symptoms. No aggravating factors. Reports hematuria has cleared and now urine is clear yellow. No fevers or chills.        < from: CT Pelvis Bony Only No Cont (02.02.22 @ 18:46) >  IMPRESSION:    Acute comminuted displaced fracture of the left intratrochanteric femur.    Large lobulated soft tissue density in curvilinear mineralization   expected region of the urinary bladder/uterus which raises likely related   to neoplasm. Jalloh catheter is in place with the balloon displaced to the   left by the soft tissue lesion. The tip of the Jalloh catheter tents the   urinary bladder at the left pelvic sidewall. Dedicated CT of the abdomen   and pelvis is recommended. Findings discussed with Dr. Tomlinson.    < end of copied text >    Orthopedics consulted for L hip fx  primary team called urology consult for hematuria and CT question of bladder mass  CT was done without contrast    jalloh cath was flushed in ED and urine is now clear yellow    PAST MEDICAL & SURGICAL HISTORY:  Urinary frequency  Hypertension    Family History: No family history of  malignancy  Social History: Does not smoke    ROS: All others negative except as noted above.    Home Medications:  losartan 50 mg oral tablet: 1 tab(s) orally once a day (02 Feb 2022 17:25)  solifenacin 10 mg oral tablet: 1 tab(s) orally once a day (02 Feb 2022 17:25)    Allergies    No Known Allergies      Vital Signs Last 24 Hrs  T(C): 36.6 (02 Feb 2022 19:42), Max: 36.7 (02 Feb 2022 10:12)  T(F): 97.9 (02 Feb 2022 19:42), Max: 98 (02 Feb 2022 10:12)  HR: 85 (02 Feb 2022 19:42) (74 - 91)  BP: 118/66 (02 Feb 2022 19:42) (118/66 - 131/70)  BP(mean): --  RR: 17 (02 Feb 2022 19:42) (17 - 17)  SpO2: 95% (02 Feb 2022 19:42) (95% - 97%)                          10.7   32.09 )-----------( 316      ( 02 Feb 2022 12:27 )             32.4     02-02    138  |  107  |  32<H>  ----------------------------<  106<H>  5.0   |  24  |  0.96    Ca    8.9      02 Feb 2022 12:27  Mg     2.3     02-02    TPro  5.7<L>  /  Alb  2.6<L>  /  TBili  0.5  /  DBili  x   /  AST  37  /  ALT  31  /  AlkPhos  82  02-02

## 2022-02-02 NOTE — H&P ADULT - PROBLEM SELECTOR PLAN 5
On admission pt had white count of 32k  likely due to leukemoid reaction 2/2 fracture, infection or malignancy  will trend CBC with differential

## 2022-02-02 NOTE — CONSULT NOTE ADULT - SUBJECTIVE AND OBJECTIVE BOX
MARCOS GUEVARA  MRN-[id number]    ORTHOPEDICS    Diagnosis:    - Left Hip Intertrochanteric Fracture  - Left Olecranon fx (closed)     89yFemale, with Pmhx: urinary frequency, being treated by urologist outpatient, who was admitted s/p fall at home at approximately 3am early today, where she sustained a left hip intertroch fx and left olecranon fx. Pt was unable to stand nor ambulate. Patient called for help, where her neighbor came and called for an ambulance to seek medical attention. Pt accompanied by her neighbor, Summer Saini, [phone], in the ER. Pt reports no family or siblings/children, and wishes Summer to be her emergency contact for medical decisions, if patient may one day not have mental capacity to make medical decisions. Pain of the left hip is rated 5/10 at rest right now, pain is severe with ROM of left hip. Pain of the left elbow is rated 3/10, controlled with splint.   Pt denies Chest pain, SOB, dyspnea, paresthesias, N/V/D, abdominal pain, syncope, or pain anywhere else. Denies head trauma        Vital Signs Last 24 Hrs  T(C): 36.6 (2022 15:27), Max: 36.7 (2022 10:12)  T(F): 97.9 (2022 15:27), Max: 98 (2022 10:12)  HR: 91 (2022 15:27) (74 - 91)  BP: 126/63 (2022 15:27) (126/63 - 131/70)  BP(mean): --  RR: 17 (2022 15:27) (17 - 17)  SpO2: 96% (2022 15:27) (96% - 97%)  I&O's Detail      Physical Exam:    General: AAOx3, NAD, resting comfortably in bed.    Left elbow: Skin is spink, warm and tender over the fracture site. Skin is intact with bone tenting. no open lesions. Decreased ROM left elbow 2* to pain. +tricipital weakness noted against resistance  compartments soft. +ecchymoses noted. good cap refill 5/5  strength. FROM of the wrist and fingers. NT over the left shoulder and c-spine. good cap refill. Rad/uln/median nerves intact.     Left Hip:   Skin is pink and warm. Tender at the fracture site. Decreased ROM of the affected hip due to pain. SILT.  Positive logroll test.  Lower extremity:  No calf tenderness, calves are soft. 2+pulses. NVI. 5/5 Strength of EHL/TA/gastrocnemius B/L.  Good capillary refill. Warm, well-perfused.                           10.7   32.09 )-----------( 316      ( 2022 12:27 )             32.4     02-    138  |  107  |  32<H>  ----------------------------<  106<H>  5.0   |  24  |  0.96    Ca    8.9      2022 12:27  Mg     2.3         TPro  5.7<L>  /  Alb  2.6<L>  /  TBili  0.5  /  DBili  x   /  AST  37  /  ALT  31  /  AlkPhos  82        Urinalysis Basic - ( 2022 13:49 )    Color: Red / Appearance: very cloudy / S.020 / pH: x  Gluc: x / Ketone: Small  / Bili: Negative / Urobili: Negative   Blood: x / Protein: 500 mg/dL / Nitrite: Negative   Leuk Esterase: Small / RBC: >50 /HPF / WBC 3-5 /HPF   Sq Epi: x / Non Sq Epi: Negative /HPF / Bacteria: TNTC /HPF    < from: Xray Hip w/ Pelvis 2 or 3 Views, Left (22 @ 15:05) >    ACC: [id number] EXAM:  XR HIP WITH PELV 2-3V LT                          PROCEDURE DATE:  2022          INTERPRETATION:  Pelvis and left hip    HISTORY: Patient fell    Rotated view of the pelvis shows no evidence of fracture nor destructive   change of the intrinsic bones of the pelvis.    Three views of the left hip show angulated fracture of the femoral neck   with shortening of the femur.    IMPRESSION: Femoral neck fracture.        Thank you for this referral.    --- End of Report ---            SAVANNAH KOWALSKI MD; Attending Interventional Radiologist  This document has been electronically signed. 2022  3:24PM    < end of copied text >          Impression:  89yFemale  - Left Hip Intertrochanteric Fracture  - Left olecranon fracture    Plan:  -  Pain management  -  Dvt prophylaxis with Venodynes  -  Keep NPO after midnight tonight     > Verduzco cath to monitor i/o's  -  Surgeon:  Dr. Majano  -  Procedure:  Left hip IM nailing ORIF Left olecranon  -  For Surgery possibly tomorrow if medically optimized     > concerns for infection, where a work-up will be needed prior to surgery  -  Medical Optimization  -  Consent: verbal consent  -  Dr Majano called. Awaiting response   - Fracture care, bedrest. NWB of the affected extremity. MARCOS GUEVARA  [id number]    ORTHOPEDICS    Diagnosis:    - Left Hip Intertrochanteric Fracture  - Left Olecranon fx (closed)     89yFemale, with Pmhx: urinary frequency, being treated by urologist outpatient, who was admitted s/p fall at home at approximately 3am early today, where she sustained a left hip intertroch fx and left olecranon fx. Pt was unable to stand nor ambulate. Patient called for help, where her neighbor came and called for an ambulance to seek medical attention. Pt accompanied by her neighbor, Summer Saini, [phone], in the ER. Pt reports no family or siblings/children, and wishes Summer to be her emergency contact for medical decisions, if patient may one day not have mental capacity to make medical decisions. Pain of the left hip is rated 5/10 at rest right now, pain is severe with ROM of left hip. Pain of the left elbow is rated 3/10, controlled with splint.   Pt denies Chest pain, SOB, dyspnea, paresthesias, N/V/D, abdominal pain, syncope, or pain anywhere else. Denies head trauma        Vital Signs Last 24 Hrs  T(C): 36.6 (2022 15:27), Max: 36.7 (2022 10:12)  T(F): 97.9 (2022 15:27), Max: 98 (2022 10:12)  HR: 91 (2022 15:27) (74 - 91)  BP: 126/63 (2022 15:27) (126/63 - 131/70)  BP(mean): --  RR: 17 (2022 15:27) (17 - 17)  SpO2: 96% (2022 15:27) (96% - 97%)  I&O's Detail      Physical Exam:    General: AAOx3, NAD, resting comfortably in bed.    Left elbow: Skin is spink, warm and tender over the fracture site. Skin is intact with bone tenting. no open lesions. Decreased ROM left elbow 2* to pain. +tricipital weakness noted against resistance  compartments soft. +ecchymoses noted. good cap refill 5/5  strength. FROM of the wrist and fingers. NT over the left shoulder and c-spine. good cap refill. Rad/uln/median nerves intact.     Left Hip:   Skin is pink and warm. Tender at the fracture site. Decreased ROM of the affected hip due to pain. SILT.  Positive logroll test.  Lower extremity:  No calf tenderness, calves are soft. 2+pulses. NVI. 5/5 Strength of EHL/TA/gastrocnemius B/L.  Good capillary refill. Warm, well-perfused.                           10.7   32.09 )-----------( 316      ( 2022 12:27 )             32.4     02-    138  |  107  |  32<H>  ----------------------------<  106<H>  5.0   |  24  |  0.96    Ca    8.9      2022 12:27  Mg     2.3         TPro  5.7<L>  /  Alb  2.6<L>  /  TBili  0.5  /  DBili  x   /  AST  37  /  ALT  31  /  AlkPhos  82        Urinalysis Basic - ( 2022 13:49 )    Color: Red / Appearance: very cloudy / S.020 / pH: x  Gluc: x / Ketone: Small  / Bili: Negative / Urobili: Negative   Blood: x / Protein: 500 mg/dL / Nitrite: Negative   Leuk Esterase: Small / RBC: >50 /HPF / WBC 3-5 /HPF   Sq Epi: x / Non Sq Epi: Negative /HPF / Bacteria: TNTC /HPF    < from: Xray Hip w/ Pelvis 2 or 3 Views, Left (22 @ 15:05) >    ACC: [id number] EXAM:  XR HIP WITH PELV 2-3V LT                          PROCEDURE DATE:  2022          INTERPRETATION:  Pelvis and left hip    HISTORY: Patient fell    Rotated view of the pelvis shows no evidence of fracture nor destructive   change of the intrinsic bones of the pelvis.    Three views of the left hip show angulated fracture of the femoral neck   with shortening of the femur.    IMPRESSION: Femoral neck fracture.        Thank you for this referral.    --- End of Report ---            SAVANNAH KOWALSKI MD; Attending Interventional Radiologist  This document has been electronically signed. 2022  3:24PM    < end of copied text >          Impression:  89yFemale  - Left Hip Intertrochanteric Fracture  - Left olecranon fracture    Plan:  #LEFT HIP FX  -  Pain management  -  Dvt prophylaxis with Venodynes  -  Keep NPO after midnight tonight     > Verduzco cath to monitor i/o's  -  Surgeon:  Dr. Majano  -  Procedure:         > Left hip IM nailing AND ORIF Left olecranon       > Moderate Blood loss intra-op is expected. Dr Majano requesting to keep H/h above 10/30 prior to surgery.        > Will need blood cultures to rule out bacteremia prior to surgery   -  For Surgery possibly tomorrow if medically optimized     > concerns for infection, where a work-up will be needed prior to surgery  -  Medical Optimization  -  Consent: verbal consent  -  Dr Majano called. Awaiting response   - Fracture care, bedrest. NWB of the affected extremity.    #UTI  - c/w ROCEPHIN IV for UTI as per medicine    #?INFILTRATE on CXR  - Plan as per medicine MARCOS GUEVARA  MRN-[id number]    ORTHOPEDICS    Diagnosis:    - Left Hip Intertrochanteric Fracture  - Left Olecranon fx (closed)     89yFemale, with Pmhx: urinary frequency, being treated by urologist outpatient, who was admitted s/p fall at home at approximately 3am early today, where she sustained a left hip intertroch fx and left olecranon fx. Pt was unable to stand nor ambulate. Patient called for help, where her neighbor came and called for an ambulance to seek medical attention. Pt accompanied by her neighbor, Summer Saini, [phone], in the ER. Pt reports no family or siblings/children, and wishes Summer to be her emergency contact for medical decisions, if patient may one day not have mental capacity to make medical decisions. Pain of the left hip is rated 5/10 at rest right now, pain is severe with ROM of left hip. Pain of the left elbow is rated 3/10, controlled with splint.   Pt denies Chest pain, SOB, dyspnea, paresthesias, N/V/D, abdominal pain, syncope, or pain anywhere else. Denies head trauma        Vital Signs Last 24 Hrs  T(C): 36.6 (2022 15:27), Max: 36.7 (2022 10:12)  T(F): 97.9 (2022 15:27), Max: 98 (2022 10:12)  HR: 91 (2022 15:27) (74 - 91)  BP: 126/63 (2022 15:27) (126/63 - 131/70)  BP(mean): --  RR: 17 (2022 15:27) (17 - 17)  SpO2: 96% (2022 15:27) (96% - 97%)  I&O's Detail      Physical Exam:    General: AAOx3, NAD, resting comfortably in bed.    Left elbow: Skin is spink, warm and tender over the fracture site. Skin is intact with bone tenting. no open lesions. Decreased ROM left elbow 2* to pain. +tricipital weakness noted against resistance  compartments soft. +ecchymoses noted. good cap refill 5/5  strength. FROM of the wrist and fingers. NT over the left shoulder and c-spine. good cap refill. Rad/uln/median nerves intact.     Left Hip:   Skin is pink and warm. Tender at the fracture site. Decreased ROM of the affected hip due to pain. SILT.  Positive logroll test.  Lower extremity:  No calf tenderness, calves are soft. 2+pulses. NVI. 5/5 Strength of EHL/TA/gastrocnemius B/L.  Good capillary refill. Warm, well-perfused.                           10.7   32.09 )-----------( 316      ( 2022 12:27 )             32.4     02-    138  |  107  |  32<H>  ----------------------------<  106<H>  5.0   |  24  |  0.96    Ca    8.9      2022 12:27  Mg     2.3         TPro  5.7<L>  /  Alb  2.6<L>  /  TBili  0.5  /  DBili  x   /  AST  37  /  ALT  31  /  AlkPhos  82        Urinalysis Basic - ( 2022 13:49 )    Color: Red / Appearance: very cloudy / S.020 / pH: x  Gluc: x / Ketone: Small  / Bili: Negative / Urobili: Negative   Blood: x / Protein: 500 mg/dL / Nitrite: Negative   Leuk Esterase: Small / RBC: >50 /HPF / WBC 3-5 /HPF   Sq Epi: x / Non Sq Epi: Negative /HPF / Bacteria: TNTC /HPF    < from: Xray Hip w/ Pelvis 2 or 3 Views, Left (22 @ 15:05) >    ACC: [id number] EXAM:  XR HIP WITH PELV 2-3V LT                          PROCEDURE DATE:  2022          INTERPRETATION:  Pelvis and left hip    HISTORY: Patient fell    Rotated view of the pelvis shows no evidence of fracture nor destructive   change of the intrinsic bones of the pelvis.    Three views of the left hip show angulated fracture of the femoral neck   with shortening of the femur.    IMPRESSION: Femoral neck fracture.        Thank you for this referral.    --- End of Report ---            SAVANNAH KOWALSKI MD; Attending Interventional Radiologist  This document has been electronically signed. 2022  3:24PM    < end of copied text >          Impression:  89yFemale  - Left Hip Intertrochanteric Fracture  - Left olecranon fracture    Plan:  #LEFT HIP FX  -  Pain management  -  Dvt prophylaxis with Venodynes     > Verduzco cath to monitor i/o's       > Will need blood cultures to rule out bacteremia prior to surgery   -  May require surgery if medically optimized     > concerns for infection, where a work-up will be needed prior to surgery  -  Medical Optimization  -  Consent: verbal consent  - treat with closed Fracture treatment / care, bedrest. NWB of the affected extremity.    #UTI  - c/w ROCEPHIN IV for UTI as per medicine    #?INFILTRATE on CXR  - Plan as per medicine

## 2022-02-02 NOTE — CHART NOTE - NSCHARTNOTEFT_GEN_A_CORE
MEDICAL ATTENDING NOTE  Patient is a 89y old  Female who presents with a chief complaint of Left elbow and hip fracture (2022 18:03)      HPI:  Pt is a 89 yo F lives alone at home, independent, ambulates sometimes with a rolator, baseline AAOx2-3 with PMH of Urinary incontinence, HTN and PSH of left mastectomy presents to the ED after she had a fall this morning at home. Pt was going to the bathroom around 3am and on the way tripped between her legs and fell down. She couldn't stand up after the incident and called for help. She did not hit her head on the floor or lose consciousness. Her neighbor heard her around 7am and helped her to get up and called EMS to come to the hospital. No h/o fever, headache, dizziness, abdominal pain, N/V. Pt was prescribed yesterday Solifenacin for urinary incontinence. No recent travel/illness.  (2022 18:03)      INTERVAL HPI/OVERNIGHT EVENTS: no new complaints    MEDICATIONS  (STANDING):  cefTRIAXone   IVPB 1000 milliGRAM(s) IV Intermittent every 24 hours  sodium chloride 0.9%. 1000 milliLiter(s) (80 mL/Hr) IV Continuous <Continuous>    MEDICATIONS  (PRN):  acetaminophen     Tablet .. 975 milliGRAM(s) Oral every 8 hours PRN Mild Pain (1 - 3)  morphine  - Injectable 1 milliGRAM(s) IV Push every 4 hours PRN Severe Pain (7 - 10)  oxyCODONE    IR 2.5 milliGRAM(s) Oral every 4 hours PRN Moderate Pain (4 - 6)      __________________________________________________  REVIEW OF SYSTEMS:    CONSTITUTIONAL: No fever,   EYES: no acute visual disturbances  NECK: No pain or stiffness  RESPIRATORY: No cough; No shortness of breath  CARDIOVASCULAR: No chest pain, no palpitations  GASTROINTESTINAL: No pain. No nausea or vomiting; No diarrhea   NEUROLOGICAL: No headache or numbness, no tremors  MUSCULOSKELETAL: No joint pain, no muscle pain  GENITOURINARY: no dysuria, no frequency, no hesitancy  PSYCHIATRY: no depression , no anxiety  ALL OTHER  ROS negative        Vital Signs Last 24 Hrs  T(C): 36.6 (2022 19:42), Max: 36.7 (2022 10:12)  T(F): 97.9 (2022 19:42), Max: 98 (2022 10:12)  HR: 85 (2022 19:42) (74 - 91)  BP: 118/66 (2022 19:42) (118/66 - 131/70)  BP(mean): --  RR: 17 (2022 19:42) (17 - 17)  SpO2: 95% (2022 19:42) (95% - 97%)    ________________________________________________  PHYSICAL EXAM:  GENERAL: Alert, cooperative elderly woman in NAD  HEENT: Normocephalic;  conjunctivae and sclerae clear; moist mucous membranes;   NECK : supple  CHEST/LUNG: Clear to auscultation bilaterally with good air entry   HEART: S1 S2  regular; no murmurs, gallops or rubs  ABDOMEN: Soft, Nontender, Nondistended; Bowel sounds present  EXTREMITIES: left hip is externally rotated; no cyanosis; no edema; no calf tenderness; stasis changes on the anterior left leg; hypermelanotic areas on the right leg.   SKIN: stasis changes on the anterior left leg; hypermelanotic areas on the right leg  NERVOUS SYSTEM:  Awake and alert; Oriented  to place, person and time; motion of the left LE is limited by pain.     _________________________________________________  LABS:                        10.7   32.09 )-----------( 316      ( 2022 12:27 )             32.4     02-    138  |  107  |  32<H>  ----------------------------<  106<H>  5.0   |  24  |  0.96    Ca    8.9      2022 12:27  Mg     2.3         TPro  5.7<L>  /  Alb  2.6<L>  /  TBili  0.5  /  DBili  x   /  AST  37  /  ALT  31  /  AlkPhos  82        Urinalysis Basic - ( 2022 13:49 )    Color: Red / Appearance: very cloudy / S.020 / pH: x  Gluc: x / Ketone: Small  / Bili: Negative / Urobili: Negative   Blood: x / Protein: 500 mg/dL / Nitrite: Negative   Leuk Esterase: Small / RBC: >50 /HPF / WBC 3-5 /HPF   Sq Epi: x / Non Sq Epi: Negative /HPF / Bacteria: TNTC /HPF    < from: Xray Elbow AP + Lateral + Oblique, Left (22 @ 12:10) >      IMPRESSION: Olecranon fracture.    < end of copied text >    < from: Xray Chest 1 View AP/PA (22 @ 13:52) >    IMPRESSION:  Questionable left infiltrate. Follow-up study is recommended as   clinically warranted.    < end of copied text >    < from: Xray Hip w/ Pelvis 2 or 3 Views, Left (22 @ 15:05) >      IMPRESSION: Femoral neck fracture.    < end of copied text >    < from: CT Pelvis Bony Only No Cont (22 @ 18:46) >    Large lobulated soft tissue density in curvilinear mineralization   expected region of the urinary bladder/uterus which raises likely related   to neoplasm. Verduzco catheter is in place with the balloon displaced to the   left by the soft tissue lesion. The tip of the Verduzco catheter tents the   urinary bladder at the left pelvic sidewall. Dedicated CT of the abdomen   and pelvis is recommended. Findings discussed with Dr. Tomlinson.    < end of copied text >    EKG:  NSR, LVH    IMP  Left hip fracture after a fall has prevented here from mobilizing.           Hematuria with finding of a pelvic mass on the CT of the pelvis, likely a bladder mass, but radiologist advises CT with IV contrast to define.           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           Possible left infiltrate, no clinical evidence of hypoxia or sepsis.           venous stasis          melanotic skin lesion, right leg  Plan: Orthopedics consultation, appreciated.          Urology consultation, Dr. Dunham (Dr. Paze)          3-way Verduzco, as recommended by Urology.           CT pelvis with IV contrast.  CT Chest to follow up possible infiltrate.  No need to contrast.           IV hydration.          Type and screen PRBC         follow H/H at midnight and AM.

## 2022-02-02 NOTE — H&P ADULT - ATTENDING COMMENTS
Pt is a 89 yo F lives alone at home, independent, ambulates sometimes with a rolator, baseline AAOx2-3 with PMH of Urinary incontinence, HTN and PSH of left mastectomy (left breast CA 30 yrs ago) presents to the ED after she had a fall this morning at home. Pt was going to the bathroom around 3am and on the way tripped between her legs and fell down. She couldn't stand up after the incident and called for help. She did not hit her head on the floor or lose consciousness. Her neighbor heard her around 7am and helped her to get up and called EMS to come to the hospital. No h/o fever, headache, dizziness, abdominal pain, N/V. Pt was prescribed yesterday Solifenacin for urinary incontinence. No recent travel/illness.       PHYSICAL EXAM:  GENERAL: Alert, cooperative elderly woman in NAD  HEENT: Normocephalic;  conjunctivae and sclerae clear; moist mucous membranes;   NECK : supple  CHEST/LUNG: Clear to auscultation bilaterally with good air entry   HEART: S1 S2  regular; no murmurs, gallops or rubs  ABDOMEN: Soft, Nontender, Nondistended; Bowel sounds present  EXTREMITIES: left hip is externally rotated; no cyanosis; no edema; no calf tenderness; stasis changes on the anterior left leg; hypermelanotic areas on the right leg.   SKIN: stasis changes on the anterior left leg; hypermelanotic areas on the right leg  NERVOUS SYSTEM:  Awake and alert; Oriented  to place, person and time; motion of the left LE is limited by pain.     _________________________________________________  LABS:                        10.7   32.09 )-----------( 316      ( 2022 12:27 )             32.4     02-02    138  |  107  |  32<H>  ----------------------------<  106<H>  5.0   |  24  |  0.96    Ca    8.9      2022 12:27  Mg     2.3     02-    TPro  5.7<L>  /  Alb  2.6<L>  /  TBili  0.5  /  DBili  x   /  AST  37  /  ALT  31  /  AlkPhos  82        Urinalysis Basic - ( 2022 13:49 )    Color: Red / Appearance: very cloudy / S.020 / pH: x  Gluc: x / Ketone: Small  / Bili: Negative / Urobili: Negative   Blood: x / Protein: 500 mg/dL / Nitrite: Negative   Leuk Esterase: Small / RBC: >50 /HPF / WBC 3-5 /HPF   Sq Epi: x / Non Sq Epi: Negative /HPF / Bacteria: TNTC /HPF    < from: Xray Elbow AP + Lateral + Oblique, Left (22 @ 12:10) >      IMPRESSION: Olecranon fracture.    < end of copied text >    < from: Xray Chest 1 View AP/PA (22 @ 13:52) >    IMPRESSION:  Questionable left infiltrate. Follow-up study is recommended as   clinically warranted.    < end of copied text >    < from: Xray Hip w/ Pelvis 2 or 3 Views, Left (22 @ 15:05) >      IMPRESSION: Femoral neck fracture.    < end of copied text >    < from: CT Pelvis Bony Only No Cont (22 @ 18:46) >    Large lobulated soft tissue density in curvilinear mineralization   expected region of the urinary bladder/uterus which raises likely related   to neoplasm. Verduzco catheter is in place with the balloon displaced to the   left by the soft tissue lesion. The tip of the Verduzco catheter tents the   urinary bladder at the left pelvic sidewall. Dedicated CT of the abdomen   and pelvis is recommended. Findings discussed with Dr. Tomlinson.    < end of copied text >    EKG:  NSR, LVH    IMP  Left hip fracture after a fall has prevented here from mobilizing.           Hematuria with finding of a pelvic mass on the CT of the pelvis, likely a bladder mass, but radiologist advises CT with IV contrast to define.           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           Possible left infiltrate, no clinical evidence of hypoxia or sepsis.           venous stasis          melanotic skin lesion, right leg  Plan: Orthopedics consultation, appreciated.          Urology consultation, Dr. Dunham (Dr. Paez)          3-way Verduzco, as recommended by Urology.           CT pelvis with IV contrast.  CT Chest to follow up possible infiltrate.  No need to contrast.           IV hydration.          Type and screen PRBC         follow H/H at midnight and AM.

## 2022-02-02 NOTE — CONSULT NOTE ADULT - ASSESSMENT
89 yo female followed by Dr. Patel for gross hematuria in the office, refusing cystoscopy and CT urogram for hematuria workup, presenting after fall with hip and left olecranon fracture. Jalloh placed noting gross hematuria yesterday that resolved overnight.     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 Possibly involving bladder in setting of recurrent gross hematuria.  -- Patient reports that she refuses to believe that she has cancer despite discussing the very high likelihood of malignancy given findings on CT  -- Discussed concern for likely malignancy with patient who states that "this is not cancer"   -- Patient refusing cystoscopy; regardless, would not recommend at this time with WBC 23 and +UA  -- f/u urine culture  -- continue antibiotics  -- continue jalloh; monitor color and hand irrigate prn  -- recommend CT Urogram with delayed images  -- f/u oncologic workup   -- recommend goals of care discussion   -- will follow     Discussed with house staff

## 2022-02-02 NOTE — H&P ADULT - ASSESSMENT
Tenderness and LROM in left elbow and left hip Pt is a 89 yo F lives alone at home, independent, ambulates sometimes with a rolator, baseline AAOx2-3 with PMH of Urinary incontinence, HTN and PSH of left mastectomy presents to the ED after she had a fall this morning at home.    Tenderness and LROM in left elbow and left hip Pt is a 89 yo F lives alone at home, independent, ambulates sometimes with a rolator, baseline AAOx2-3 with PMH of Urinary incontinence, HTN and PSH of left mastectomy (left breast CA 30 yrs ago) presents to the ED after she had a fall this morning at home.      RCRI score 1 point - Class 1 risk 6% 30 day risk of death, MI, or cardiac arrest

## 2022-02-02 NOTE — ED PROVIDER NOTE - MUSCULOSKELETAL, MLM
Tenderness and ecchymosis with swelling to the left olecranon. Bilateral venous stasis changes in the lower extremities.

## 2022-02-02 NOTE — CONSULT NOTE ADULT - ATTENDING COMMENTS
Patient seen/examined. Agree with above and edited as appropriate. Patient seen/examined. Agree with above and edited as appropriate.  Pt will followed by Dr Patel.

## 2022-02-02 NOTE — H&P ADULT - HISTORY OF PRESENT ILLNESS
Pt is a 89 yo F lives alone at home, independent, ambulates sometimes with a rolator, baseline AAOx2-3 with PMH of Urinary incontinence, HTN and PSH of left mastectomy presents to the ED after she had a fall this morning at home. Pt was going to the bathroom around 3am and on the way tripped between her legs and fell down. She couldn't stand up after the incident and called for help. She did not hit her head on the floor or lose consciousness. Her neighbor heard her around 7am and helped her to get up and called EMS to come to the hospital. No h/o fever, headache, dizziness, abdominal pain, N/V. Pt was prescribed yesterday Solifenacin for urinary incontinence. No recent travel/illness.  Pt is a 89 yo F lives alone at home, independent, ambulates sometimes with a rolator, baseline AAOx2-3 with PMH of Urinary incontinence, HTN and PSH of left mastectomy (left breast CA 30 yrs ago) presents to the ED after she had a fall this morning at home. Pt was going to the bathroom around 3am and on the way tripped between her legs and fell down. She couldn't stand up after the incident and called for help. She did not hit her head on the floor or lose consciousness. Her neighbor heard her around 7am and helped her to get up and called EMS to come to the hospital. No h/o fever, headache, dizziness, abdominal pain, N/V. Pt was prescribed yesterday Solifenacin for urinary incontinence. No recent travel/illness.

## 2022-02-02 NOTE — CONSULT NOTE ADULT - ATTENDING COMMENTS
pt evaluated agree.  treat with closed fracture care / treatemnt of l IT / subtroch fx and l olecrenon fracture.  splint applie to LUE.  nwb l ll and lue, will need medical optimization, treatemtn of infection.  may need surgical fixation.  will follow

## 2022-02-03 NOTE — CONSULT NOTE ADULT - SUBJECTIVE AND OBJECTIVE BOX
Source of information: , Chart review, patient  Patient language: English  : n/a    CC: Patient is a 89y old  Female who presents with a chief complaint of Left elbow and hip fracture (2022 09:25)      HPI:  Pt is a 89 yo F lives alone at home, independent, ambulates sometimes with a rolator, baseline AAOx2-3 with PMH of Urinary incontinence, HTN and PSH of left mastectomy (left breast CA 30 yrs ago) presents to the ED after she had a fall this morning at home. Pt was going to the bathroom around 3am and on the way tripped between her legs and fell down. She couldn't stand up after the incident and called for help. She did not hit her head on the floor or lose consciousness. Her neighbor heard her around 7am and helped her to get up and called EMS to come to the hospital. No h/o fever, headache, dizziness, abdominal pain, N/V. Pt was prescribed yesterday Solifenacin for urinary incontinence. No recent travel/illness.  (2022 18:03)      Patient stated goal for pain control: to be able to take deep breaths, utilize incentive spirometer, get out of bed to chair and ambulate with tolerable pain control.     PAIN SCORE:       SCALE USED: (1-10 VNRS)    PAST MEDICAL & SURGICAL HISTORY:  Urinary frequency    Hypertension        FAMILY HISTORY:        SOCIAL HISTORY:  [ ] Denies Smoking, Alcohol, or Drug Use    Allergies    No Known Allergies    Intolerances        MEDICATIONS:    MEDICATIONS  (STANDING):  cefTRIAXone   IVPB 1000 milliGRAM(s) IV Intermittent every 24 hours  heparin   Injectable 5000 Unit(s) SubCutaneous every 8 hours  sodium chloride 0.9%. 1000 milliLiter(s) (80 mL/Hr) IV Continuous <Continuous>    MEDICATIONS  (PRN):  acetaminophen     Tablet .. 975 milliGRAM(s) Oral every 8 hours PRN Mild Pain (1 - 3)  morphine  - Injectable 1 milliGRAM(s) IV Push every 4 hours PRN Severe Pain (7 - 10)  oxyCODONE    IR 2.5 milliGRAM(s) Oral every 4 hours PRN Moderate Pain (4 - 6)      Vital Signs Last 24 Hrs  T(C): 36.7 (2022 13:04), Max: 36.8 (2022 05:09)  T(F): 98 (2022 13:04), Max: 98.2 (2022 05:09)  HR: 79 (2022 13:04) (79 - 91)  BP: 110/55 (2022 13:04) (110/55 - 126/63)  BP(mean): --  RR: 18 (2022 13:04) (17 - 18)  SpO2: 95% (2022 13:04) (95% - 97%)    LABS:                          8.7    23.34 )-----------( 275      ( 2022 05:58 )             26.0     02-03    139  |  109<H>  |  22<H>  ----------------------------<  88  4.3   |  23  |  0.78    Ca    8.4      2022 05:58  Mg     2.2     02-03    TPro  4.9<L>  /  Alb  2.1<L>  /  TBili  0.5  /  DBili  x   /  AST  68<H>  /  ALT  36  /  AlkPhos  63  02-03    PT/INR - ( 2022 05:58 )   PT: 12.3 sec;   INR: 1.04 ratio         PTT - ( 2022 05:58 )  PTT:30.8 sec  LIVER FUNCTIONS - ( 2022 05:58 )  Alb: 2.1 g/dL / Pro: 4.9 g/dL / ALK PHOS: 63 U/L / ALT: 36 U/L DA / AST: 68 U/L / GGT: x           Urinalysis Basic - ( 2022 22:13 )    Color: Brown / Appearance: bloody / S.020 / pH: x  Gluc: x / Ketone: Trace  / Bili: Negative / Urobili: Negative   Blood: x / Protein: 500 mg/dL / Nitrite: Negative   Leuk Esterase: Moderate / RBC: >50 /HPF / WBC >50 /HPF   Sq Epi: x / Non Sq Epi: Few /HPF / Bacteria: Moderate /HPF      CAPILLARY BLOOD GLUCOSE          REVIEW OF SYSTEMS:  CONSTITUTIONAL: No fever or fatigue  RESPIRATORY: No cough, wheezing, chills or hemoptysis; No shortness of breath  CARDIOVASCULAR: No chest pain, palpitations, dizziness, or leg swelling  GASTROINTESTINAL: No abdominal or epigastric pain. No nausea, vomiting; No diarrhea or constipation.   GENITOURINARY: No dysuria, frequency, hematuria, retention or incontinence  MUSCULOSKELETAL: No joint pain or swelling; No muscle, back, or extremity pain, no upper or lower motor strength weakness, no saddle anesthesia, bowel/bladder incontinence, no falls   NEURO: No weakness, no numbness   PSYCHIATRIC: No depression, anxiety, mood swings, or difficulty sleeping    PHYSICAL EXAM:  GENERAL:  Alert & Oriented X3, NAD, Good concentration  CHEST/LUNG: Clear to auscultation bilaterally; No rales, rhonchi, wheezing, or rubs  HEART: Regular rate and rhythm; No murmurs, rubs, or gallops  ABDOMEN: Soft, Nontender, Nondistended; Bowel sounds present  : no incontinence, no flank pain  EXTREMITIES:  2+ Peripheral Pulses, No cyanosis, or edema  MUSCULOSKELETAL: Motor Strength 5/5 B/L upper and lower extremities; moves all extremities equally against gravity; ROM intact; negative SRL  NEUROLOGICAL: awake and alert and oriented   SKIN: No rashes or lesions    Radiology:    Drug Screen:  heparin   Injectable 5000 Unit(s) SubCutaneous every 8 hours    cefTRIAXone   IVPB 1000 milliGRAM(s) IV Intermittent every 24 hours        ORT Score   Family Hx of substance abuse	Female	Male  Alcohol 	                                              1              3  Illegal drugs	                                      2              3  Rx drugs                                               4	      4    Personal Hx of substance abuse		  Alcohol 	                                               3	      3  Illegal drugs                                  	       4	      4  Rx drugs                                                5	      5  Age between 16- 45 years	               1             1  hx preadolescent sexual abuse	       3	      0  Psychological disease		  ADD, OCD, bipolar, schizophrenia	2	      2  Depression                                    	1	      1  Score totals 		  		  a score of 3 or lower indicates low risk for opioid abuse		  a score of 4-7 indicates moderate risk for opioid abuse		  a score of 8 or higher indicates high risk for opioid abuse	    Risk factors associated with adverse outcomes related to opioid treatment  [ ]  Concurrent benzodiazepine use  [ ]  History/ Active substance use or alcohol use disorder  [ ] Psychiatric co-morbidity  [ ] Sleep apnea  [ ] COPD  [ ] BMI> 35  [ ] Liver dysfunction  [ ] Renal dysfunction  [ ] CHF  [ ] Smoker  [ ]  Age > 60 years      [ ]  [place]  Reviewed and Copied to Chart. See below.           Source of information: , Chart review, patient  Patient language: English  : n/a    CC: Patient is a 89y old  Female who presents with a chief complaint of Left elbow and hip fracture (2022 09:25)      HPI:  Pt is a 89 yo F lives alone at home, independent, ambulates sometimes with a rolator, baseline AAOx2-3 with PMH of Urinary incontinence, HTN and PSH of left mastectomy (left breast CA 30 yrs ago) presents to the ED after she had a fall this morning at home. Pt was going to the bathroom around 3am and on the way tripped between her legs and fell down. She couldn't stand up after the incident and called for help. She did not hit her head on the floor or lose consciousness. Her neighbor heard her around 7am and helped her to get up and called EMS to come to the hospital. No h/o fever, headache, dizziness, abdominal pain, N/V. Pt was prescribed yesterday Solifenacin for urinary incontinence. No recent travel/illness.  (2022 18:03)    Pt s/p fall with left hip fracture.  + left hip pain.  Pain on exertion.  + bladder mass seen. Pending further workup to rule out pathologic fracture.  No nausea or vomiting.      PAIN SCORE:    5/10   SCALE USED: (1-10 VNRS)    PAST MEDICAL & SURGICAL HISTORY:  Urinary frequency    Hypertension    FAMILY HISTORY:    SOCIAL HISTORY:  [x ] Denies Smoking, Alcohol, or Drug Use    Allergies    No Known Allergies    Intolerances        MEDICATIONS:    MEDICATIONS  (STANDING):  cefTRIAXone   IVPB 1000 milliGRAM(s) IV Intermittent every 24 hours  heparin   Injectable 5000 Unit(s) SubCutaneous every 8 hours  sodium chloride 0.9%. 1000 milliLiter(s) (80 mL/Hr) IV Continuous <Continuous>    MEDICATIONS  (PRN):  acetaminophen     Tablet .. 975 milliGRAM(s) Oral every 8 hours PRN Mild Pain (1 - 3)  morphine  - Injectable 1 milliGRAM(s) IV Push every 4 hours PRN Severe Pain (7 - 10)  oxyCODONE    IR 2.5 milliGRAM(s) Oral every 4 hours PRN Moderate Pain (4 - 6)      Vital Signs Last 24 Hrs  T(C): 36.7 (2022 13:04), Max: 36.8 (2022 05:09)  T(F): 98 (2022 13:04), Max: 98.2 (2022 05:09)  HR: 79 (2022 13:04) (79 - 91)  BP: 110/55 (2022 13:04) (110/55 - 126/63)  BP(mean): --  RR: 18 (2022 13:04) (17 - 18)  SpO2: 95% (2022 13:04) (95% - 97%)    LABS:                          8.7    23.34 )-----------( 275      ( 2022 05:58 )             26.0     02-03    139  |  109<H>  |  22<H>  ----------------------------<  88  4.3   |  23  |  0.78    Ca    8.4      2022 05:58  Mg     2.2     02-03    TPro  4.9<L>  /  Alb  2.1<L>  /  TBili  0.5  /  DBili  x   /  AST  68<H>  /  ALT  36  /  AlkPhos  63  02-03    PT/INR - ( 2022 05:58 )   PT: 12.3 sec;   INR: 1.04 ratio         PTT - ( 2022 05:58 )  PTT:30.8 sec  LIVER FUNCTIONS - ( 2022 05:58 )  Alb: 2.1 g/dL / Pro: 4.9 g/dL / ALK PHOS: 63 U/L / ALT: 36 U/L DA / AST: 68 U/L / GGT: x           Urinalysis Basic - ( 2022 22:13 )    Color: Brown / Appearance: bloody / S.020 / pH: x  Gluc: x / Ketone: Trace  / Bili: Negative / Urobili: Negative   Blood: x / Protein: 500 mg/dL / Nitrite: Negative   Leuk Esterase: Moderate / RBC: >50 /HPF / WBC >50 /HPF   Sq Epi: x / Non Sq Epi: Few /HPF / Bacteria: Moderate /HPF      CAPILLARY BLOOD GLUCOSE          REVIEW OF SYSTEMS:  CONSTITUTIONAL: No fever or fatigue  RESPIRATORY: No cough, wheezing, chills or hemoptysis; No shortness of breath  CARDIOVASCULAR: No chest pain, palpitations, dizziness, or leg swelling  GASTROINTESTINAL: No abdominal or epigastric pain. No nausea, vomiting; No diarrhea or constipation.   GENITOURINARY: No dysuria, frequency, hematuria, retention or incontinence  MUSCULOSKELETAL: + left hip pain  NEURO: No weakness, no numbness   PSYCHIATRIC: No depression, anxiety, mood swings, or difficulty sleeping    PHYSICAL EXAM:  GENERAL:  Alert & Oriented X3, NAD, Good concentration  CHEST/LUNG: Clear to auscultation bilaterally; No rales, rhonchi, wheezing, or rubs  HEART: Regular rate and rhythm; No murmurs, rubs, or gallops  ABDOMEN: Soft, Nontender, Nondistended; Bowel sounds present  : no incontinence, no flank pain  EXTREMITIES:  2+ Peripheral Pulses, No cyanosis, or edema  MUSCULOSKELETAL: + left hip tenderness + decreased rom   NEUROLOGICAL: awake and alert and oriented   SKIN: No rashes or lesions    Radiology:  < from: MR Pelvis Bony Only No Cont (22 @ 18:28) >  NATION: MR FEMUR LEFT, MR PELVIS BONY    CLINICAL INDICATION:Left femur fracture. Evaluate for underlying lesion.    COMPARISON: CT abdomen pelvis 2/3/2022, CT pelvis and hip radiographs   dated 2022    TECHNIQUE: MRI of the left femur and bony pelvis was performed without   intravenous contrast.    INTERPRETATION:    Bone and bone marrow: There is prominence of the bone marrow in the   pelvis and lower lumbar spine.    There is a chronic, healed deformity of the sacrum. There is patchy bone   marrow edema within the sacrum at the level of S2 which likely represents   an insufficiency fracture. There is an angulated intertrochanteric   fracture of the left proximal femur with surrounding marrow edema,   without an underlying marrow replacing lesion or findings to suggest   pathological fracture. Partiallyimaged lower lumbar spine demonstrates   healed compression fractures as seen on prior CT.    Soft tissues: There are post traumatic changes in the soft tissues   surrounding the left hip region with edema within the iliopsoas muscle,   with tail musculature and adductor compartment as well as the hamstring.   There is a layering hematoma within the greater trochanteric bursa. This   measures approximately 3.2 x 6.6 x 7 cm. There is extensive muscle strain   in the proximal left thigh.    Other: There is a Verduzco catheter in the bladder. There is a large T2   intermediate mass in the pelvis intimately associated with the plantar   which has been previously described on CT measuring at least 11.7 x 9.9 x   8.8 cm highly concerning for malignancy.    IMPRESSION:    1. Left intertrochanteric fracture without underlying bony lesion at the   site of fracture. Varus angulation is present. Extensive soft tissue   changes including extensive muscle edema as detailed above, and 7 cm   layering hematoma in the region of the greater trochanteric bursa.    2. Sacral insufficiency fracture, a finding superimposed on chronic   healed deformity.    3. No convincing evidence of bony metastatic disease.    4. Large 11.7 cm pelvic mass previously described on abdominopelvic CT   highly concerning for neoplasm. This is intimately associated with the   bladder but could arise from other pelvic anatomy.    < end of copied text >      Drug Screen:  heparin   Injectable 5000 Unit(s) SubCutaneous every 8 hours    cefTRIAXone   IVPB 1000 milliGRAM(s) IV Intermittent every 24 hours        ORT Score   Family Hx of substance abuse	Female	Male  Alcohol 	                                              1              3  Illegal drugs	                                      2              3  Rx drugs                                               4	      4    Personal Hx of substance abuse		  Alcohol 	                                               3	      3  Illegal drugs                                  	       4	      4  Rx drugs                                                5	      5  Age between 16- 45 years	               1             1  hx preadolescent sexual abuse	       3	      0  Psychological disease		  ADD, OCD, bipolar, schizophrenia	2	      2  Depression                                    	1	      1  Score totals 		0  		  a score of 3 or lower indicates low risk for opioid abuse		  a score of 4-7 indicates moderate risk for opioid abuse		  a score of 8 or higher indicates high risk for opioid abuse	    Risk factors associated with adverse outcomes related to opioid treatment  [ ]  Concurrent benzodiazepine use  [ ]  History/ Active substance use or alcohol use disorder  [ ] Psychiatric co-morbidity  [ ] Sleep apnea  [ ] COPD  [ ] BMI> 35  [ ] Liver dysfunction  [ ] Renal dysfunction  [ ] CHF  [ ] Smoker  x[ ]  Age > 60 years      [ x]  [place]  Reviewed and Copied to Chart. See below.

## 2022-02-03 NOTE — PROGRESS NOTE ADULT - PROBLEM SELECTOR PLAN 4
- UA (+)  - Ucx pending-f/u results   - C/w Ceftriaxone - (+) Verduzco, not hematuria noted, urine brown.    - 2/2 CT pelv bone & abd/pelv w/  IV contrast  - MR Pelv w/ IV contrast pending-f/u results.  MR form given to MR dept  - Urologist-Dr. Dunham consulted, appreciated   -  Hgb stable, continue to monitor CBC Q12h. - (+) Verduzco, not hematuria noted, urine brown.    - 2/2 CT pelv bone & abd/pelv w/  IV contrast  - MR Pelv w/ IV contrast pending-f/u results.  MR form given to MR dept  - Urologist-Dr. Dunham consulted, appreciated   -  Hgb stable  - 2/3 1U PRBC's tonight for PreOp optimization  - CBC in AM-f/u results

## 2022-02-03 NOTE — PROGRESS NOTE ADULT - ASSESSMENT
89yo Female lives alone at home, independent, ambulates sometimes with a rolator, baseline AAOx2-3 with PMH of Urinary incontinence, HTN and PSH of left mastectomy (left breast CA 30 yrs ago.  Presented to the ED after fall.  Admitted for Left Olecranon Fx & Left Hip Fx.  After placement of Verduzco in ED had gross hematuria.  CT abd/pelv showed a large pelvic mass suspicious for malignancy.  OR was cancelled 2/3 and malignancy & infectious workup requested by Ortho prior to OR.

## 2022-02-03 NOTE — PROGRESS NOTE ADULT - ASSESSMENT
89 yo female followed by Dr. Patel for gross hematuria in the office, refusing cystoscopy and CT urogram for hematuria workup, presenting after fall with hip and left olecranon fracture. Jalloh placed noting gross hematuria yesterday that resolved overnight.   WBC 23, UA+, SCr 0.78    -- CT images reviewed demonstrating large pelvic malignant appearing mass of uncertain pathology. Possibly involving bladder in setting of recurrent gross hematuria   -- Discussed concern for likely malignancy with patient who states that this is not cancer   -- Patient refusing cystoscopy; regardless, would not recommend at this time with WBC 23 and +UA  -- f/u urine culture  -- continue antibiotics  -- continue jalloh; monitor color and hand irrigate prn  -- recommend CT Urogram with delayed images to evaluate bladder contour   -- f/u oncologic workup   -- recommend goals of care discussion   -- will follow     Discussed with Dr. Patel  89 yo female followed by Dr. Patel for gross hematuria in the office, refusing cystoscopy and CT urogram for hematuria workup, presenting after fall with hip and left olecranon fracture. Jalloh placed noting gross hematuria yesterday that resolved overnight.   WBC 23, UA+, SCr 0.78    -- CT images reviewed demonstrating large pelvic malignant appearing mass of uncertain pathology. Possibly involving bladder in setting of recurrent gross hematuria.  -- Patient reports that she refuses to believe that she has cancer despite discussing the very high likelihood of malignancy given findings on CT  -- Discussed concern for likely malignancy with patient who states that this is not cancer   -- Patient refusing cystoscopy; regardless, would not recommend at this time with WBC 23 and +UA  -- f/u urine culture  -- continue antibiotics  -- continue jalloh; monitor color and hand irrigate prn  -- recommend CT Urogram with delayed images to evaluate bladder contour   -- f/u oncologic workup   -- recommend goals of care discussion   -- will follow     Discussed with house staff

## 2022-02-03 NOTE — PROGRESS NOTE ADULT - SUBJECTIVE AND OBJECTIVE BOX
Subjective    Patient seen and examined. States she feels well, denies pain. Says her urine has cleared up since yesterday, currently yellow in jalloh.   Patient is closely follow by Dr. Patel and was recently seen for witnessed gross hematuria in the office, was refusing cystoscopy and did not obtain scheduled CT urogram. Denies that she had been having gross hematuria.     Objective    Vital signs  T(F): , Max: 98.2 (22 @ 05:09)  HR: 81 (22 @ 05:09)  BP: 123/48 (22 @ 05:09)  SpO2: 96% (22 @ 05:09)  Wt(kg): --    Output      @ 07:01  -   @ 07:00  --------------------------------------------------------  IN: 0 mL / OUT: 500 mL / NET: -500 mL        General: NAD  Abdomen: soft/non-tender/non-distended  : jalloh in place draining clear yellow urine    Labs       @ 05:58    WBC 23.34 / Hct 26.0  / SCr 0.78      @ 00:06    WBC 25.52 / Hct 27.1  / SCr --       Urinalysis Basic - ( 2022 22:13 )    Color: Brown / Appearance: bloody / S.020 / pH: x  Gluc: x / Ketone: Trace  / Bili: Negative / Urobili: Negative   Blood: x / Protein: 500 mg/dL / Nitrite: Negative   Leuk Esterase: Moderate / RBC: >50 /HPF / WBC >50 /HPF   Sq Epi: x / Non Sq Epi: Few /HPF / Bacteria: Moderate /HPF    Urine Cx: pending     Imaging:  < from: CT Abdomen and Pelvis w/ IV Cont (22 @ 01:14) >      INTERPRETATION:  Large pelvic malignant appearing mass uncertain primary   - bladder v gyn v bowel.  heterogenous ossesous appearance concerning for mets although ostepenia   may have this appearance  chronic appearing multilevel thoracic and lumbar compression frxs    see separate pelvis ct dication for left femur findings    < end of copied text >   Subjective    Patient seen and examined. States she feels well, denies pain. Says her urine has cleared up since yesterday, currently yellow in jalloh.   Patient is closely follow by Dr. Patel and was recently seen for witnessed gross hematuria in the office, was refusing cystoscopy and did not obtain scheduled CT urogram. Denies that she had been having gross hematuria. Refuses to believe that she has cancer.     Objective    Vital signs  T(F): , Max: 98.2 (22 @ 05:09)  HR: 81 (22 @ 05:09)  BP: 123/48 (22 @ 05:09)  SpO2: 96% (22 @ 05:09)  Wt(kg): --    Output      @ 07:01  -   @ 07:00  --------------------------------------------------------  IN: 0 mL / OUT: 500 mL / NET: -500 mL        General: NAD  Abdomen: soft/non-tender/non-distended  : jalloh in place draining clear yellow urine    Labs       @ 05:58    WBC 23.34 / Hct 26.0  / SCr 0.78      @ 00:06    WBC 25.52 / Hct 27.1  / SCr --       Urinalysis Basic - ( 2022 22:13 )    Color: Brown / Appearance: bloody / S.020 / pH: x  Gluc: x / Ketone: Trace  / Bili: Negative / Urobili: Negative   Blood: x / Protein: 500 mg/dL / Nitrite: Negative   Leuk Esterase: Moderate / RBC: >50 /HPF / WBC >50 /HPF   Sq Epi: x / Non Sq Epi: Few /HPF / Bacteria: Moderate /HPF    Urine Cx: pending     Imaging:  < from: CT Abdomen and Pelvis w/ IV Cont (22 @ 01:14) >      INTERPRETATION:  Large pelvic malignant appearing mass uncertain primary   - bladder v gyn v bowel.  heterogenous ossesous appearance concerning for mets although ostepenia   may have this appearance  chronic appearing multilevel thoracic and lumbar compression frxs    see separate pelvis ct dication for left femur findings    < end of copied text >

## 2022-02-03 NOTE — PROGRESS NOTE ADULT - PROBLEM SELECTOR PLAN 7
- C/w SCD for DVT ppx Home med: Losartan on hold d/t possible infection  - BP stable  - Continue to monitor BP and adjust medication accordingly

## 2022-02-03 NOTE — PROGRESS NOTE ADULT - ATTENDING COMMENTS
Patient is alert today.  Eager to have hip surgery.  Denies that she could have a pelvic malignancy.     Vital Signs Last 24 Hrs  T(C): 36.7 (03 Feb 2022 13:04), Max: 36.8 (03 Feb 2022 05:09)  T(F): 98 (03 Feb 2022 13:04), Max: 98.2 (03 Feb 2022 05:09)  HR: 79 (03 Feb 2022 13:04) (79 - 89)  BP: 110/55 (03 Feb 2022 13:04) (110/55 - 123/48)  BP(mean): --  RR: 18 (03 Feb 2022 13:04) (17 - 18)  SpO2: 95% (03 Feb 2022 13:04) (95% - 97%)  Lungs, clear  Cor, RRR  ABdomen, soft  Neurological, intact                        8.7    23.34 )-----------( 275      ( 03 Feb 2022 05:58 )             26.0   02-03    139  |  109<H>  |  22<H>  ----------------------------<  88  4.3   |  23  |  0.78    Ca    8.4      03 Feb 2022 05:58  Mg     2.2     02-03    TPro  4.9<L>  /  Alb  2.1<L>  /  TBili  0.5  /  DBili  x   /  AST  68<H>  /  ALT  36  /  AlkPhos  63  02-03    < from: CT Abdomen and Pelvis w/ IV Cont (02.03.22 @ 01:14) >    PERITONEUM: No ascites.  VESSELS: Atherosclerotic changes. Pelvic varices.  RETROPERITONEUM/LYMPH NODES: No lymphadenopathy.  ABDOMINAL WALL: 4 x 1.6 cm right abdominal wall subcutaneous cyst (2:58).  BONES: Comminuted angulated left intertrochanteric femoral neck fracture.   Avulsion of the lesser trochanter. Chronic healed left superior and   inferior pubic rami fracture. Chronic right sacral deformity. Severe T12   compression fracture. Moderate L2 compression fracture. Osteoporosis.    IMPRESSION:  Acute left hip fracture. Please refer to CT pelvis report.    11.0 x 9.2 x 8.6 cm predominantly cystic or necrotic pelvic mass.   Consider bladder, uterine or ovarian neoplasm.    Diffuse osteoporosis limits evaluation for metastatic disease.    < end of copied text >    MRI, requested by Orthopedics, was performed today.     IMP:  Left hip fracture after a fall. Possibly pathologic fracture.           hematuria, improving          Pelvic mass, bladder vs uterine impinging upon bladder, will need further evaluation after repair of hip.           decrease H/H, likely some acute blood loss into hip, as well as a small amount from bladder.           Patient is clinically stable and optimized for surgery to repair her left hip.   Plan:  Tx one unit PRBC.            recheck H/H in AM.            NPO after midnight           f/u reading of MRI.           Bone scan to be done early AM tomorrow.

## 2022-02-03 NOTE — PROGRESS NOTE ADULT - SUBJECTIVE AND OBJECTIVE BOX
NP Note discussed with  primary attending    Patient is a 89y old  Female who presents with a chief complaint of Left elbow and hip fracture (2022 13:14)      INTERVAL HPI/OVERNIGHT EVENTS: Pt reports, "I'm disappointed I can't have surgery."  NP asked pt if she was told why she can't have surgery today.  Pt states, "they said they saw a mass, I'm positive its not cancer."  NP encouraged pt to continue thinking positively.  NP discussed the request for MR and bone scan.  Pt states, "I hate all these tests, lets' hold off on the bone scan and see what the MR shows."  "I don't think I need all that, let's see."  NP verbalized understanding and completed MR safety for w/ pt.  Denies left hip or left elbow pain.  However, reports left leg pain w/ movement of leg during daily care.  Reports that she eats "special K or cheerio's w/ banana for breakfast, I like orange juice too."      MEDICATIONS  (STANDING):  cefTRIAXone   IVPB 1000 milliGRAM(s) IV Intermittent every 24 hours  heparin   Injectable 5000 Unit(s) SubCutaneous every 8 hours  sodium chloride 0.9%. 1000 milliLiter(s) (80 mL/Hr) IV Continuous <Continuous>    MEDICATIONS  (PRN):  acetaminophen     Tablet .. 975 milliGRAM(s) Oral every 8 hours PRN Mild Pain (1 - 3)  morphine  - Injectable 1 milliGRAM(s) IV Push every 4 hours PRN Severe Pain (7 - 10)  oxyCODONE    IR 2.5 milliGRAM(s) Oral every 4 hours PRN Moderate Pain (4 - 6)      __________________________________________________  REVIEW OF SYSTEMS:    CONSTITUTIONAL: No fever  EYES: No acute visual disturbances  NECK: No pain or stiffness  RESPIRATORY: No cough; No shortness of breath  CARDIOVASCULAR: No chest pain, no palpitations  GASTROINTESTINAL: No pain. No nausea or vomiting.  No diarrhea   NEUROLOGICAL: No headache or numbness, no tremors  MUSCULOSKELETAL: (+) Left leg pain w/ movement.  no muscle pain  GENITOURINARY: No dysuria, no frequency, no hesitancy  PSYCHIATRY: No depression , no anxiety  ALL OTHER  ROS negative        Vital Signs Last 24 Hrs  T(C): 36.7 (2022 13:04), Max: 36.8 (2022 05:09)  T(F): 98 (2022 13:04), Max: 98.2 (2022 05:09)  HR: 79 (2022 13:04) (79 - 89)  BP: 110/55 (2022 13:04) (110/55 - 123/48)  RR: 18 (2022 13:04) (17 - 18)  SpO2: 95% (2022 13:04) (95% - 97%)    ________________________________________________  PHYSICAL EXAM:  GENERAL: NAD  HEENT: Normocephalic;  conjunctivae and sclerae clear; moist mucous membranes;   NECK : Supple  CHEST/LUNG: Clear to auscultation bilaterally with good air entry   HEART: S1 S2  regular; no murmurs, gallops or rubs  ABDOMEN: Soft, Nontender, Nondistended; Bowel sounds present x 4 quad. Right lower and midline firmness upon palpation, no pain or guarding noted.   (+) Verduzco  EXTREMITIES: (+) Left arm sling and ace wrap, intact and dry:  Decreased ROM of LLE.   Unable to palpate LLE dorsalis pedis pulse.   Able to palpate a weak RLE dorsalis pedis pulse.  no edema; no calf tenderness  SKIN: Warm and dry; no rash.  B/l LE skin c/w PVD: Right LE proximal to ankle reddish-blue discoloration, Left LE from shin to ankle reddish- blue discoloration w/ brittle crust on anterior surface.    NERVOUS SYSTEM:  Awake and alert; Oriented  to place, person and time ; no new deficits    _________________________________________________  LABS:                        8.7    23.34 )-----------( 275      ( 2022 05:58 )             26.0     02-03    139  |  109<H>  |  22<H>  ----------------------------<  88  4.3   |  23  |  0.78    Ca    8.4      2022 05:58  Mg     2.2     -03    TPro  4.9<L>  /  Alb  2.1<L>  /  TBili  0.5  /  DBili  x   /  AST  68<H>  /  ALT  36  /  AlkPhos  63  02-03    PT/INR - ( 2022 05:58 )   PT: 12.3 sec;   INR: 1.04 ratio         PTT - ( 2022 05:58 )  PTT:30.8 sec  Urinalysis Basic - ( 2022 22:13 )    Color: Brown / Appearance: bloody / S.020 / pH: x  Gluc: x / Ketone: Trace  / Bili: Negative / Urobili: Negative   Blood: x / Protein: 500 mg/dL / Nitrite: Negative   Leuk Esterase: Moderate / RBC: >50 /HPF / WBC >50 /HPF   Sq Epi: x / Non Sq Epi: Few /HPF / Bacteria: Moderate /HPF      CAPILLARY BLOOD GLUCOSE            RADIOLOGY & ADDITIONAL TESTS:    Imaging Personally Reviewed:  YES    Consultant(s) Notes Reviewed:   YES    Care Discussed with Consultants :     Plan of care was discussed with patient and /or primary care giver; all questions and concerns were addressed and care was aligned with patient's wishes.

## 2022-02-03 NOTE — CONSULT NOTE ADULT - PROBLEM SELECTOR RECOMMENDATION 9
[facility]  s/p fall + left hip fracture.  pending mri  High risk medications reviewed. Avoid polypharmacy. Avoid IV opioids. Avoid NSAIDs and benzodiazepines. Non-pharmacological sleep aides initiated. Non-opioid medications and non-pharmacological pain management measures initiated.  Routine Meds  - Acetaminophen 975mg po q 8 hours prn  Mild Pain ( Pain Level - 1-3)  - Non - Pharmacological Measures  Moderate Pain (Pain Level 4-6)  - Oxycodone 2.5mg po q 4 hours prn  Severe Pain ( Pain Level - 7-10)  - morphine 1mg ivp q 4 hours prn   Bowel Regimen   - Senna and Miralax  OR when optimizied

## 2022-02-03 NOTE — PROGRESS NOTE ADULT - PROBLEM SELECTOR PLAN 2
- C/w sling for left elbow fx stabilization  - Management as above - Stable, denies left elbow or hip pain   - 2/3 planned OR ORIF Left olecranon and Left hip IM nailing on hold for malignancy and infectious workup  - C/w pain control with PRN morphine, oxycodone, tylenol.  - Orth-Dr. Majano consulted, appreciated   - Pain mgmt consulted, appreciated

## 2022-02-03 NOTE — PROGRESS NOTE ADULT - PROBLEM SELECTOR PLAN 3
- (+) Verduzco, not hematuria noted, urine brown.    - 2/2 CT pelv bone & abd/pelv w/  IV contrast  - MR Pelv w/ IV contrast pending-f/u results.  MR form given to MR dept  - Urologist-Dr. Dunham consulted, appreciated   -  Hgb stable, continue to monitor CBC Q12h. - C/w sling for left elbow fx stabilization  - Management as above

## 2022-02-03 NOTE — CHART NOTE - NSCHARTNOTEFT_GEN_A_CORE
EVENT: Per radiology Dr Sykes, unable to give a read on CT abd/pelvis, for follow up with day team    Vital Signs Last 24 Hrs  T(C): 36.7 (02 Feb 2022 21:10), Max: 36.7 (02 Feb 2022 10:12)  T(F): 98.1 (02 Feb 2022 21:10), Max: 98.1 (02 Feb 2022 21:10)  HR: 83 (02 Feb 2022 21:10) (74 - 91)  BP: 118/61 (02 Feb 2022 21:10) (118/61 - 131/70)  BP(mean): --  RR: 18 (02 Feb 2022 21:10) (17 - 18)  SpO2: 97% (02 Feb 2022 21:10) (95% - 97%)                          8.9    25.52 )-----------( 278      ( 03 Feb 2022 00:06 )             27.1       PLAN:  1. Nurse instructed to activate second type and cross match  2. PRBC ordered on call to OR    FOLLOW UP: AM CBC

## 2022-02-03 NOTE — PROGRESS NOTE ADULT - PROBLEM SELECTOR PLAN 6
Home med: Losartan on hold d/t possible infection  - BP stable  - Continue to monitor BP and adjust medication accordingly - Infection vs. malignancy  - Bld cx & Ucx pending-f/u results   - C/w Ceftriaxone

## 2022-02-03 NOTE — PROGRESS NOTE ADULT - PROBLEM SELECTOR PLAN 5
- Infection vs. malignancy  - Bld cx & Ucx pending-f/u results   - C/w Ceftriaxone - UA (+)  - Ucx pending-f/u results   - C/w Ceftriaxone

## 2022-02-03 NOTE — PROGRESS NOTE ADULT - PROBLEM SELECTOR PLAN 1
- Stable, denies left elbow or hip pain   - 2/3 planned OR ORIF Left olecranon and Left hip IM nailing on hold for malignancy and infectious workup  - C/w pain control with PRN morphine, oxycodone, tylenol.  - Orth-Dr. Majano consulted, appreciated   - Pain mgmt consulted, appreciated - 2/2 Abnormal CXR and CT's suspicious for malignancy: Bladder vs. Gyn vs. Bowel  -see below

## 2022-02-03 NOTE — CONSULT NOTE ADULT - ASSESSMENT
Home    My Content    Search   Help   Log out  Prescription Monitoring Program Registry  Welcome Nicky Lamb×   Help  ×An MME Calculator is now available by clicking the MME tab on the purple task bar.  An informational sheet detailing this enhancement may be found by clicking ‘MME Calculator Help’ on the MME Calculator page.   This informational tool is a resource and is not meant for direct clinical application.   Patient Search   Multi-Patient Search   MME Calculator   Reports   Drug List   Designation   My MIGUELITO #  Data Detail Level: Printer-Friendly View Extended View  Confidential Drug Utilization Report  Search Terms: gareth yuriy, 10/08/1929Search Date: 02/04/2022 11:00:22 AM  The Drug Utilization Report below displays all of the controlled substance prescriptions, if any, that your patient has filled in the last twelve months. The information displayed on this report is compiled from pharmacy submissions to the Department, and accurately reflects the information as submitted by the pharmacies.    This report was requested by: Nicky Lamb | Reference #: [id number]    There are no results for the search terms that you entered.

## 2022-02-03 NOTE — PROGRESS NOTE ADULT - SUBJECTIVE AND OBJECTIVE BOX
MARCOS GUEVARA  MRN-[id number]    ORTHOPEDICS    Diagnosis:    - Left Hip Intertrochanteric Fracture  - Left Olecranon fx (closed)  - large pelvis mass on CT pelvis w/ IVC    24hr interval:  - large pelvic mass, suspicious for malignant lesion of ?gyn, bowel or other  - surgery cancelled today, needs full metastatic work-up    Pt seen and eval today. Pain controlled of left hip.  Left hip pain rated 3/10, non-radiating, controlled with pain meds  Urology consulted called for hematuria, bloody urine, no clots. Possible need for CBI, Dr Dunham, urology, on board.   Pt denies Chest pain, SOB, dyspnea, paresthesias, N/V/D, abdominal pain, syncope, or pain anywhere else.     Vital Signs Last 24 Hrs  T(C): 36.6 (02 Feb 2022 15:27), Max: 36.7 (02 Feb 2022 10:12)  T(F): 97.9 (02 Feb 2022 15:27), Max: 98 (02 Feb 2022 10:12)  HR: 91 (02 Feb 2022 15:27) (74 - 91)  BP: 126/63 (02 Feb 2022 15:27) (126/63 - 131/70)  BP(mean): --  RR: 17 (02 Feb 2022 15:27) (17 - 17)  SpO2: 96% (02 Feb 2022 15:27) (96% - 97%)  I&O's Detail      Physical Exam:    General: AAOx3, NAD, resting comfortably in bed.    Left elbow: +Posterior splint intact. Skin is spink, warm and tender over the fracture site. Skin is intact with bone tenting. no open lesions. Decreased ROM left elbow 2* to pain. +tricipital weakness noted against resistance  compartments soft. +ecchymoses noted. good cap refill 5/5  strength. FROM of the wrist and fingers. NT over the left shoulder and c-spine. good cap refill. Rad/uln/median nerves intact.     Left Hip:   Skin is pink and warm. Tender at the fracture site. Decreased ROM of the affected hip due to pain. SILT.  Positive logroll test.  Lower extremity:  No calf tenderness, calves are soft. 2+pulses. NVI. 5/5 Strength of EHL/TA/gastrocnemius B/L.  Good capillary refill. Warm, well-perfused.     Labs:                           8.7    23.34 )-----------( 275      ( 03 Feb 2022 05:58 )             26.0   02-03    139  |  109<H>  |  22<H>  ----------------------------<  88  4.3   |  23  |  0.78    Ca    8.4      03 Feb 2022 05:58  Mg     2.2     02-03    TPro  4.9<L>  /  Alb  2.1<L>  /  TBili  0.5  /  DBili  x   /  AST  68<H>  /  ALT  36  /  AlkPhos  63  02-03        RADIOLOGY:     < from: CT Abdomen and Pelvis w/ IV Cont (02.03.22 @ 01:14) >    ******PRELIMINARY REPORT******      ******PRELIMINARY REPORT******       ACC: [id number] EXAM:  CT ABDOMEN AND PELVIS IC                          PROCEDURE DATE:  02/03/2022    ******PRELIMINARY REPORT******      ******PRELIMINARY REPORT******           INTERPRETATION:  Large pelvic malignant appearing mass uncertain primary   - bladder v gyn v bowel.  heterogenous ossesous appearance concerning for mets although ostepenia   may have this appearance  chronic appearing multilevel thoracic and lumbar compression frxs    see separate pelvis ct dication for left femur findings        ******PRELIMINARY REPORT******      ******PRELIMINARY REPORT******         MARTHA LINK MD; Attending Radiologist    < end of copied text >      < from: Xray Hip w/ Pelvis 2 or 3 Views, Left (02.02.22 @ 15:05) >    ACC: [id number] EXAM:  XR HIP WITH PELV 2-3V LT                          PROCEDURE DATE:  02/02/2022          INTERPRETATION:  Pelvis and left hip    HISTORY: Patient fell    Rotated view of the pelvis shows no evidence of fracture nor destructive   change of the intrinsic bones of the pelvis.    Three views of the left hip show angulated fracture of the femoral neck   with shortening of the femur.    IMPRESSION: Femoral neck fracture.        Thank you for this referral.    --- End of Report ---            SAVANNAH KOWALSKI MD; Attending Interventional Radiologist  This document has been electronically signed. Feb 2 2022  3:24PM    < end of copied text >          Impression:  89yFemale  - Left Hip Intertrochanteric Fracture  - Left olecranon fracture    Plan:  #LEFT HIP FRACTURE  - R/o Pathologic fracture, R/o metastatic lesions left femur     > Recommending MRI of the Pelvis w/ gadolinium, MRI of the Left femur w/ gadolinium     > Bone scan to assess for metastatic lesions (of the whole body) to rule out metastatic lesions  -  Pain management  -  Dvt prophylaxis with Venodynes/heparin sq  [id number] [id number]     > May need transfusion of prbc as needed to keep patient hemodynamically stable          - If the patient is a surgical candidate, will need h/h >10/30. Will determine surgical candidacy once all imaging, mentioned above. We need more data.      > Verduzco cath to monitor i/o's - Plan as per UROLOGY  -  Procedure planned:         > Left hip IM nailing AND ORIF Left olecranon       > Moderate Blood loss intra-op is expected. Dr Majano requesting to keep H/h above 10/30 prior to surgery.        > Will need blood cultures to rule out bacteremia prior to surgery - concern for leukocytosis    -  Medical Optimization  -  Consent: verbal consent  -  case dw Dr Majano  - Fracture care, bedrest. NWB of the affected extremity.    #UTI  - c/w ROCEPHIN IV for UTI as per medicine    #?INFILTRATE on CXR  - Plan as per medicine

## 2022-02-04 NOTE — PROGRESS NOTE ADULT - PROBLEM SELECTOR PLAN 7
Home med: Losartan on hold d/t possible infection  - BP stable  - Continue to monitor BP and adjust medication accordingly

## 2022-02-04 NOTE — PROGRESS NOTE ADULT - PROBLEM SELECTOR PLAN 2
- Stable, denies left elbow or hip pain   - 2/3 planned OR ORIF Left olecranon and Left hip IM nailing on hold for malignancy and infectious workup  - bone scan negative  - C/w pain control with PRN morphine, oxycodone, tylenol.  - Orth-Dr. Majano consulted, appreciated   - Pain mgmt consulted, appreciated  - NPO after MN for surgery tomorrow as per ortho.

## 2022-02-04 NOTE — PROGRESS NOTE ADULT - ASSESSMENT
Home    My Content    Search   Help   Log out  Prescription Monitoring Program Registry  Welcome Nicky Lamb×   Help  ×An MME Calculator is now available by clicking the MME tab on the purple task bar.  An informational sheet detailing this enhancement may be found by clicking ‘MME Calculator Help’ on the MME Calculator page.   This informational tool is a resource and is not meant for direct clinical application.   Patient Search   Multi-Patient Search   MME Calculator   Reports   Drug List   Designation   My MIGUELITO #  Data Detail Level: Printer-Friendly View Extended View  Confidential Drug Utilization Report  Search Terms: gareth yuriy, 10/08/1929Search Date: 02/04/2022 11:00:22 AM  The Drug Utilization Report below displays all of the controlled substance prescriptions, if any, that your patient has filled in the last twelve months. The information displayed on this report is compiled from pharmacy submissions to the Department, and accurately reflects the information as submitted by the pharmacies.    This report was requested by: Nicky Lamb | Reference #: [id number]    There are no results for the search terms that you entered.

## 2022-02-04 NOTE — PROGRESS NOTE ADULT - ATTENDING COMMENTS
Patient was seen this AM, pre-operatively.    Vital Signs Last 24 Hrs  T(C): 37.1 (04 Feb 2022 16:15), Max: 37.4 (04 Feb 2022 00:05)  T(F): 98.7 (04 Feb 2022 16:15), Max: 99.4 (04 Feb 2022 00:05)  HR: 82 (04 Feb 2022 16:15) (76 - 98)  BP: 117/66 (04 Feb 2022 16:15) (107/54 - 134/66)  BP(mean): --  RR: 16 (04 Feb 2022 16:15) (16 - 17)  SpO2: 96% (04 Feb 2022 16:15) (95% - 98%)                            9.3    19.38 )-----------( 217      ( 04 Feb 2022 07:03 )             28.1   02-04    140  |  110<H>  |  18  ----------------------------<  90  3.7   |  25  |  0.53    Ca    8.1<L>      04 Feb 2022 07:03  Phos  2.4     02-04  Mg     2.5     02-04    TPro  4.8<L>  /  Alb  1.9<L>  /  TBili  0.6  /  DBili  x   /  AST  60<H>  /  ALT  40  /  AlkPhos  58  02-04  < from: MR Femur No Cont, Left (02.03.22 @ 18:27) >      1. Left intertrochanteric fracture without underlying bony lesion at the   site of fracture. Varus angulation is present. Extensive soft tissue   changes including extensive muscle edema as detailed above, and 7 cm   layering hematoma in the region of the greater trochanteric bursa.    2. Sacral insufficiency fracture, a finding superimposed on chronic   healed deformity.    3. No convincing evidence of bony metastatic disease.    4. Large 11.7 cm pelvic mass previously described on abdominopelvic CT   highly concerning for neoplasm. This is intimately associated with the   bladder but could arise from other pelvic anatomy.    < end of copied text >    n< from: NM Bone Imaging Total (02.04.22 @ 11:13) >    FINDINGS: There is kyphoscoliosis, with heterogeneous radionuclide   distribution in the spine, probably due to degenerative disease. There is   physiologic distribution of radiotracer in the remainder of the   visualized osseous structures. No abnormal increased radionuclide   accumulation is seen in the left hip region. There is asymmetrical mild,   diffuse soft tissue activity/edema of the visualized left upper   extremity, the left elbow is not included in the field-of-view.    Both kidneys are visualized. Urinary bladder activity is confined to the   left side. Activity is present within the urinary drainage tubing.    IMPRESSION: Bone scan:    Kyphoscoliosis with degenerative changes in the spine.    No evidenceto suggest the presence of osseous metastasis.    No abnormal increased uptake in the left hip region.    < end of copied text >    IMP:  Left intertrochanteric hip fracture after a fall.          Pelvic mass, associated with the bladder.  No evidence of elvin metastases.   Plan: Operative repair of the left hip fracture.          Cysto-retrograde after hip repair, if patient agrees.

## 2022-02-04 NOTE — PROGRESS NOTE ADULT - PROBLEM SELECTOR PLAN 1
[facility]  s/p fall + left hip fracture.  MRI neg for bony mets. Pending bone scan - possible OR today  High risk medications reviewed. Avoid polypharmacy. Avoid IV opioids. Avoid NSAIDs and benzodiazepines. Non-pharmacological sleep aides initiated. Non-opioid medications and non-pharmacological pain management measures initiated.  Routine Meds  - Acetaminophen 975mg po q 8 hours prn  Mild Pain ( Pain Level - 1-3)  - Non - Pharmacological Measures  Moderate Pain (Pain Level 4-6)  - Oxycodone 2.5mg po q 4 hours prn  Severe Pain ( Pain Level - 7-10)  - morphine 1mg ivp q 4 hours prn   Bowel Regimen   - Senna and Miralax  OR when optimizied.

## 2022-02-04 NOTE — PROGRESS NOTE ADULT - PROBLEM SELECTOR PLAN 1
- 2/2 Abnormal CXR and CT's with pelvic mass suspicious for malignancy  -bone scan negative for malignancy  -ortho consulted for Lt elbow and hip fx.  -

## 2022-02-04 NOTE — PROGRESS NOTE ADULT - SUBJECTIVE AND OBJECTIVE BOX
NP Note discussed with Primary Attending.    Patient is a 89y old  Female who presents with a chief complaint of Left elbow and hip fracture (2022 11:03)      INTERVAL HPI/OVERNIGHT EVENTS: no new complaints    MEDICATIONS  (STANDING):  cefTRIAXone   IVPB 1000 milliGRAM(s) IV Intermittent every 24 hours  heparin   Injectable 5000 Unit(s) SubCutaneous every 8 hours  pantoprazole    Tablet 40 milliGRAM(s) Oral before breakfast  sodium chloride 0.9%. 1000 milliLiter(s) (80 mL/Hr) IV Continuous <Continuous>    MEDICATIONS  (PRN):  acetaminophen     Tablet .. 975 milliGRAM(s) Oral every 8 hours PRN Mild Pain (1 - 3)  morphine  - Injectable 1 milliGRAM(s) IV Push every 4 hours PRN Severe Pain (7 - 10)  oxyCODONE    IR 2.5 milliGRAM(s) Oral every 4 hours PRN Moderate Pain (4 - 6)      __________________________________________________  REVIEW OF SYSTEMS:    CONSTITUTIONAL: No fever,   EYES: no acute visual disturbances  NECK: No pain or stiffness  RESPIRATORY: No cough; No shortness of breath  CARDIOVASCULAR: No chest pain, no palpitations  GASTROINTESTINAL: No pain. No nausea or vomiting; No diarrhea   NEUROLOGICAL: No headache or numbness, no tremors  MUSCULOSKELETAL: Left elbow and hip pain  GENITOURINARY: no dysuria, no frequency, no hesitancy  PSYCHIATRY: no depression , no anxiety  ALL OTHER  ROS negative        Vital Signs Last 24 Hrs  T(C): 37 (2022 14:34), Max: 37.4 (2022 00:05)  T(F): 98.6 (2022 14:34), Max: 99.4 (2022 00:05)  HR: 76 (2022 14:34) (76 - 98)  BP: 132/71 (2022 14:34) (107/54 - 134/66)  BP(mean): --  RR: 16 (2022 14:34) (16 - 17)  SpO2: 98% (2022 14:34) (95% - 98%)    ________________________________________________  PHYSICAL EXAM:  GENERAL: NAD  HEENT: Normocephalic;  conjunctivae and sclerae clear; moist mucous membranes;   NECK : supple  CHEST/LUNG: Clear to auscultation bilaterally with good air entry   HEART: S1 S2  regular; no murmurs, gallops or rubs  ABDOMEN: Soft, Nontender, Nondistended; Bowel sounds present  EXTREMITIES: no cyanosis; no edema; no calf tenderness  SKIN: warm and dry; no rash  NERVOUS SYSTEM:  Awake and alert; Oriented  to place, person and time ; no new deficits    _________________________________________________  LABS:                        9.3    19.38 )-----------( 217      ( 2022 07:03 )             28.1     02-04    140  |  110<H>  |  18  ----------------------------<  90  3.7   |  25  |  0.53    Ca    8.1<L>      2022 07:03  Phos  2.4     02-04  Mg     2.5     02-04    TPro  4.8<L>  /  Alb  1.9<L>  /  TBili  0.6  /  DBili  x   /  AST  60<H>  /  ALT  40  /  AlkPhos  58  02-04    PT/INR - ( 2022 07:03 )   PT: 12.4 sec;   INR: 1.04 ratio         PTT - ( 2022 05:58 )  PTT:30.8 sec  Urinalysis Basic - ( 2022 22:13 )    Color: Brown / Appearance: bloody / S.020 / pH: x  Gluc: x / Ketone: Trace  / Bili: Negative / Urobili: Negative   Blood: x / Protein: 500 mg/dL / Nitrite: Negative   Leuk Esterase: Moderate / RBC: >50 /HPF / WBC >50 /HPF   Sq Epi: x / Non Sq Epi: Few /HPF / Bacteria: Moderate /HPF      CAPILLARY BLOOD GLUCOSE            RADIOLOGY & ADDITIONAL TESTS:  ACC: [id number] EXAM:  NM BONE IMG WHOLE BODY                          PROCEDURE DATE:  2022          INTERPRETATION:  CLINICAL INFORMATION: 89-year-old female neoplastic   pelvic mass, status post fall with left hip and left elbow fractures.   Evaluate for bony metastasis.    RADIOPHARMACEUTICAL: 20.1 mCi Tc-99m HDP, I.V.    TECHNIQUE:  Whole-body images were obtained in the anterior and posterior   projections approximately 2-3 hours following administration of   radiotracer.    COMPARISON: No prior bone scan.    FINDINGS: There is kyphoscoliosis, with heterogeneous radionuclide   distribution in the spine, probably due to degenerative disease. There is   physiologic distribution of radiotracer in the remainder of the   visualized osseous structures. No abnormal increased radionuclide   accumulation is seen in the left hip region. There is asymmetrical mild,   diffuse soft tissue activity/edema of the visualized left upper   extremity, the left elbow is not included in the field-of-view.    Both kidneys are visualized. Urinary bladder activity is confined to the   left side. Activity is present within the urinary drainage tubing.    IMPRESSION: Bone scan:    Kyphoscoliosis with degenerative changes in the spine.    No evidenceto suggest the presence of osseous metastasis.    No abnormal increased uptake in the left hip region.    --- End of Report ---            PARK OLMOS MD; Attending Nuclear Medicine  This document has been electronically signed. 2022 11:53AM    ACC: [id number] EXAM:  MR PELVIS BONY ONLY                        ACC: [id number] EXAM:  MR FEMUR LT                          PROCEDURE DATE:  2022          INTERPRETATION:  EXAMINATION: MR FEMUR LEFT, MR PELVIS BONY    CLINICAL INDICATION:Left femur fracture. Evaluate for underlying lesion.    COMPARISON: CT abdomen pelvis 2/3/2022, CT pelvis and hip radiographs   dated 2022    TECHNIQUE: MRI of the left femur and bony pelvis was performed without   intravenous contrast.    INTERPRETATION:    Bone and bone marrow: There is prominence of the bone marrow in the   pelvis and lower lumbar spine.    There is a chronic, healed deformity of the sacrum. There is patchy bone   marrow edema within the sacrum at the level of S2 which likely represents   an insufficiency fracture. There is an angulated intertrochanteric   fracture of the left proximal femur with surrounding marrow edema,   without an underlying marrow replacing lesion or findings to suggest   pathological fracture. Partiallyimaged lower lumbar spine demonstrates   healed compression fractures as seen on prior CT.    Soft tissues: There are post traumatic changes in the soft tissues   surrounding the left hip region with edema within the iliopsoas muscle,   with tail musculature and adductor compartment as well as the hamstring.   There is a layering hematoma within the greater trochanteric bursa. This   measures approximately 3.2 x 6.6 x 7 cm. There is extensive muscle strain   in the proximal left thigh.    Other: There is a Verduzco catheter in the bladder. There is a large T2   intermediate mass in the pelvis intimately associated with the plantar   which has been previously described on CT measuring at least 11.7 x 9.9 x   8.8 cm highly concerning for malignancy.    IMPRESSION:    1. Left intertrochanteric fracture without underlying bony lesion at the   site of fracture. Varus angulation is present. Extensive soft tissue   changes including extensive muscle edema as detailed above, and 7 cm   layering hematoma in the region of the greater trochanteric bursa.    2. Sacral insufficiency fracture, a finding superimposed on chronic   healed deformity.    3. No convincing evidence of bony metastatic disease.    4. Large 11.7 cm pelvic mass previously described on abdominopelvic CT   highly concerning for neoplasm. This is intimately associated with the   bladder but could arise from other pelvic anatomy.    --- End of Report ---            SHLOMIT A GOLDBERG-STEIN MD; Attending Radiologist  This document has been electronically signed. Feb  3 2022  7:56PM  ACC: [id number] EXAM:  CT ABDOMEN AND PELVIS IC                          PROCEDURE DATE:  2022          INTERPRETATION:  CLINICAL INFORMATION: 89 years  Female with Pelvic   malignancy.    COMPARISON: CT pelvis 2022    CONTRAST/COMPLICATIONS:  IV Contrast: Omnipaque 350  90 cc administered   10 cc discarded  Oral Contrast: NONE  Complications: None reported at time of study completion    PROCEDURE:  CT of the Abdomen and Pelvis was performed.  Sagittal and coronal reformats were performed.    FINDINGS:  LOWER CHEST: I lateral lower lobe discoid atelectasis.    LIVER: Within normal limits.  BILE DUCTS: Normal caliber.  GALLBLADDER: Within normal limits.  SPLEEN: 5.6 x 4.0 cm rim calcified cyst.  PANCREAS: Within normal limits.  ADRENALS: Within normal limits.  KIDNEYS/URETERS: 2.7 cm left midpole cyst. Bilateral hypodensities too   small to characterize. No hydroureteronephrosis or calculi.    BLADDER: Verduzco catheter in situ displaced to the left. Again noted is a   lobulated irregular 11.0 x 9.2 x 8.6 cm soft tissue mass with lobulated   rim enhancement and lobulated internal septations. Rim calcifications are   present as well.  REPRODUCTIVE ORGANS: A normal uterus is not visualized.    BOWEL: No bowel obstruction. Appendixnot visualized and cannot be   assessed.  PERITONEUM: No ascites.  VESSELS: Atherosclerotic changes. Pelvic varices.  RETROPERITONEUM/LYMPH NODES: No lymphadenopathy.  ABDOMINAL WALL: 4 x 1.6 cm right abdominal wall subcutaneous cyst (2:58).  BONES: Comminuted angulated left intertrochanteric femoral neck fracture.   Avulsion of the lesser trochanter. Chronic healed left superior and   inferior pubic rami fracture. Chronic right sacral deformity. Severe T12   compression fracture. Moderate L2 compression fracture. Osteoporosis.    IMPRESSION:  Acute left hip fracture. Please refer to CT pelvis report.    11.0 x 9.2 x 8.6 cm predominantly cystic or necrotic pelvic mass.   Consider bladder, uterine or ovarian neoplasm.    Diffuse osteoporosis limits evaluation for metastatic disease.    A preliminary report was provided by Dr. Dr. Madison Spencer. I agree   with the interpretation.          --- End of Report ---            NICK RIVAS MD; Attending Radiologist  This document has been electronically signed. Feb  3 2022  9:44AM    ACC: [id number] EXAM:  CT CHEST                          PROCEDURE DATE:  2022          INTERPRETATION:  CLINICAL INFORMATION: 89 years  Female with To r/o   malignancy.    COMPARISON: None.    CONTRAST/COMPLICATIONS:  IV Contrast: None  Oral Contrast: None  Complications: None    PROCEDURE:  CT of the Chest was performed.  Sagittal and coronal reformats were performed.    FINDINGS:    LUNGS AND AIRWAYS: Patent central airways.  8 mm left upper lobe nodule   (3:30). 1.5 cm lingular nodule (3:55). Bilateral lower lobe dependent   atelectasis.  PLEURA: No pleural effusion. Elevated left hemidiaphragm.  MEDIASTINUM AND ZOILA: No lymphadenopathy.  VESSELS: Low-attenuation blood pool consistent with anemia.   Atherosclerotic aorta without aneurysm.  HEART: Mild cardiomegaly. Trace pericardial effusion.  CHEST WALL AND LOWER NECK: Within normal limits.  VISUALIZED UPPER ABDOMEN: Within normal limits.  BONES: Severe T5, T7 and T12 compression fractures without osseous   retropulsion. Moderate L2 compression fracture. Diffuse osteoporosis.    IMPRESSION:  2 left lung nodules indeterminate for metastatic disease.    T5, T7 and T12 severe compression fractures without osseous retropulsion.   Osteoporosis. Cannot exclude underlying metastatic disease.        A preliminary report was provided by Dr. Dr. Madison Spencer . I agree   with the interpretation.    --- End of Report ---            NICK RIVAS MD; Attending Radiologist  This document has been electronically signed. Feb  3 09143:26AM    Imaging  Reviewed:  YES/NO    Consultant(s) Notes Reviewed:   YES/ No      Plan of care was discussed with patient and /or primary care giver; all questions and concerns were addressed

## 2022-02-04 NOTE — PROGRESS NOTE ADULT - SUBJECTIVE AND OBJECTIVE BOX
Source of information: , Chart review  Patient language: English  : n/a    CC:  left hip pain    This is a 89yFemale patient who presents to the hospital for Patient is a 89y old  Female who presents with a chief complaint of Left elbow and hip fracture (2022 08:55)    Pt s/p fall.  + left hip fracture.  Pt to undergo bone scan today.  MRI - no evidence of bony mets.  Pt is for possible OR today.  +left hip pain.      PAIN SCORE:  5/10       SCALE USED: (1-10 NRS)      PAST MEDICAL & SURGICAL HISTORY:  Urinary frequency    Hypertension        FAMILY HISTORY:        SOCIAL HISTORY:  [ x] Denies Smoking, Alcohol, or Drug Use    Allergies    No Known Allergies    Intolerances        MEDICATIONS:    MEDICATIONS  (STANDING):  cefTRIAXone   IVPB 1000 milliGRAM(s) IV Intermittent every 24 hours  heparin   Injectable 5000 Unit(s) SubCutaneous every 8 hours  pantoprazole    Tablet 40 milliGRAM(s) Oral before breakfast  sodium chloride 0.9%. 1000 milliLiter(s) (80 mL/Hr) IV Continuous <Continuous>    MEDICATIONS  (PRN):  acetaminophen     Tablet .. 975 milliGRAM(s) Oral every 8 hours PRN Mild Pain (1 - 3)  morphine  - Injectable 1 milliGRAM(s) IV Push every 4 hours PRN Severe Pain (7 - 10)  oxyCODONE    IR 2.5 milliGRAM(s) Oral every 4 hours PRN Moderate Pain (4 - 6)      Vital Signs Last 24 Hrs  T(C): 36.8 (2022 05:09), Max: 37.4 (2022 00:05)  T(F): 98.3 (2022 05:09), Max: 99.4 (2022 00:05)  HR: 80 (2022 10:03) (79 - 98)  BP: 123/71 (2022 10:03) (107/54 - 134/66)  BP(mean): --  RR: 17 (2022 05:09) (17 - 18)  SpO2: 95% (2022 05:09) (95% - 96%)    LABS:                          9.3    19.38 )-----------( 217      ( 2022 07:03 )             28.1     02-04    140  |  110<H>  |  18  ----------------------------<  90  3.7   |  25  |  0.53    Ca    8.1<L>      2022 07:03  Phos  2.4     -  Mg     2.5         TPro  4.8<L>  /  Alb  1.9<L>  /  TBili  0.6  /  DBili  x   /  AST  60<H>  /  ALT  40  /  AlkPhos  58  -04    PT/INR - ( 2022 07:03 )   PT: 12.4 sec;   INR: 1.04 ratio         PTT - ( 2022 05:58 )  PTT:30.8 sec  LIVER FUNCTIONS - ( 2022 07:03 )  Alb: 1.9 g/dL / Pro: 4.8 g/dL / ALK PHOS: 58 U/L / ALT: 40 U/L DA / AST: 60 U/L / GGT: x           Urinalysis Basic - ( 2022 22:13 )    Color: Brown / Appearance: bloody / S.020 / pH: x  Gluc: x / Ketone: Trace  / Bili: Negative / Urobili: Negative   Blood: x / Protein: 500 mg/dL / Nitrite: Negative   Leuk Esterase: Moderate / RBC: >50 /HPF / WBC >50 /HPF   Sq Epi: x / Non Sq Epi: Few /HPF / Bacteria: Moderate /HPF      CAPILLARY BLOOD GLUCOSE          Radiology:    Drug Screen:        REVIEW OF SYSTEMS:  CONSTITUTIONAL: No fever or fatigue  RESPIRATORY: No cough, wheezing, chills or hemoptysis; No shortness of breath  CARDIOVASCULAR: No chest pain, palpitations, dizziness, or leg swelling  GASTROINTESTINAL: No abdominal or epigastric pain. No nausea, vomiting; No diarrhea or constipation.   GENITOURINARY: No dysuria, frequency, hematuria, retention or incontinence  MUSCULOSKELETAL: + left hip pain  NEURO: No headaches, No numbness/tingling b/l LE, No weakness  PSYCHIATRIC: No depression, anxiety, mood swings, or difficulty sleeping    PHYSICAL EXAM:  GENERAL:  Alert & Oriented X3, NAD, Good concentration  CHEST/LUNG: Clear to auscultation bilaterally; No rales, rhonchi, wheezing, or rubs  HEART: Regular rate and rhythm; No murmurs, rubs, or gallops  ABDOMEN: Soft, Nontender, Nondistended; Bowel sounds present  EXTREMITIES:  2+ Peripheral Pulses, No cyanosis, or edema  MUSCULOSKELETAL: + decreased rom + left hip tenderness   SKIN: No rashes or lesions    Risk factors associated with adverse outcomes related to opioid treatment  [ ]  Concurrent benzodiazepine use  [ ]  History/ Active substance use or alcohol use disorder  [ ] Psychiatric co-morbidity  [ ] Sleep apnea  [ ] COPD  [ ] BMI> 35  [ ] Liver dysfunction  [ ] Renal dysfunction  [ ] CHF  [ ] Smoker  [x ]  Age > 60 years    [x ]  [place]  Reviewed and Copied to Chart. See below.    Plan of care and goal oriented pain management treatment options were discussed with patient and /or primary care giver; all questions and concerns were addressed and care was aligned with patient's wishes.    Educated patient on goal oriented pain management treatment options     22 @ 11:03

## 2022-02-04 NOTE — PROGRESS NOTE ADULT - ASSESSMENT
89yo Female lives alone at home, independent, ambulates sometimes with a rolator, baseline AAOx2-3 with PMH of Urinary incontinence, HTN and PSH of left mastectomy (left breast CA 30 yrs ago.  Presented to the ED after fall.  Admitted for Left Olecranon Fx & Left Hip Fx.  After placement of Verduzco in ED had gross hematuria.  CT abd/pelv showed a large pelvic mass suspicious for malignancy.  OR was cancelled 2/3 and malignancy & infectious workup requested by Ortho prior to OR.     02/04- pt seen and examined at bedside, Bone scan negative for malignancy, NPO after MN for possible surgery Saturday, as per ortho pt H&H must be above 10/30 for safe procedure. Patient received 1 U PRBC for H&H 9.3/28.1 in AM. Will recheck CBC in AM. CT urogram ordered as recommended by urology, pt is refusing cytoscopy.

## 2022-02-04 NOTE — PROGRESS NOTE ADULT - ASSESSMENT
89 yo female with gross hematuria presenting after fall with hip and left olecranon fracture.    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 Possibly involving bladder in setting of recurrent gross hematuria.  -- Patient continues to report that she does not believe that she has cancer despite discussing the very high likelihood of malignancy given findings on CT  -- Again discussed concern for likely malignancy with patient who states that this is not cancer   -- Patient refusing cystoscopy; regardless, would not recommend at this time with elevated WBC and concern for UTI  -- f/u urine culture  -- continue antibiotics  -- continue jalloh; monitor color and hand irrigate prn  -- recommend CT Urogram with delayed images to evaluate bladder contour when able  -- f/u oncologic workup   -- recommend goals of care discussion   -- will follow     Discussed with house staff

## 2022-02-04 NOTE — PROGRESS NOTE ADULT - PROBLEM SELECTOR PLAN 4
- (+) Verduzco, not hematuria noted, urine brown.    - 2/2 CT pelv bone & abd/pelv w/  IV contrast  - MR Pelv w/ IV contrast pending-f/u results.  MR form given to MR dept  - Urologist-Dr. Dunham consulted, appreciated   -  Hgb stable  - 2/3 1U PRBC's tonight for PreOp optimization  - CBC in AM-f/u results  - CT urogram ordered  - f/u results

## 2022-02-04 NOTE — PROGRESS NOTE ADULT - SUBJECTIVE AND OBJECTIVE BOX
Patient seen/examined. Feels better. Urine remains clear. WBC decreasing.    Vital Signs Last 24 Hrs  T(C): 36.8 (04 Feb 2022 05:09), Max: 37.4 (04 Feb 2022 00:05)  T(F): 98.3 (04 Feb 2022 05:09), Max: 99.4 (04 Feb 2022 00:05)  HR: 81 (04 Feb 2022 05:09) (79 - 98)  BP: 120/56 (04 Feb 2022 05:09) (107/54 - 134/66)  BP(mean): --  RR: 17 (04 Feb 2022 05:09) (17 - 18)  SpO2: 95% (04 Feb 2022 05:09) (95% - 96%)               General: NAD. AAOx3  Abd: soft. NT/ND  : Verduzco with clear urine.               9.3    19.38 )-----------( 217      ( 04 Feb 2022 07:03 )             28.1     02-03    139  |  109<H>  |  22<H>  ----------------------------<  88  4.3   |  23  |  0.78    Ca    8.4      03 Feb 2022 05:58  Mg     2.2     02-03    TPro  4.9<L>  /  Alb  2.1<L>  /  TBili  0.5  /  DBili  x   /  AST  68<H>  /  ALT  36  /  AlkPhos  63  02-03

## 2022-02-04 NOTE — PROGRESS NOTE ADULT - SUBJECTIVE AND OBJECTIVE BOX
MARCOS GUEVARA  MRN-[id number]    ORTHOPEDICS    Diagnosis:    - Left Hip Intertrochanteric Fracture  - Left Olecranon fx (closed)  - large pelvis mass on CT pelvis w/ IVC    24hr interval:  - large pelvic mass, suspicious for malignant lesion of ?gyn, bowel or other      Pt seen and eval today. Pain controlled of left hip.  Left hip pain rated 3/10, non-radiating, controlled with pain meds  Patient had MRI or pelvis and femur yesterday.   Patient is for bone scan this morning.  Pt denies Chest pain, SOB, dyspnea, paresthesias, N/V/D, abdominal pain, syncope, or pain anywhere else.     ICU Vital Signs Last 24 Hrs  T(C): 36.8 (04 Feb 2022 05:09), Max: 37.4 (04 Feb 2022 00:05)  T(F): 98.3 (04 Feb 2022 05:09), Max: 99.4 (04 Feb 2022 00:05)  HR: 81 (04 Feb 2022 05:09) (79 - 98)  BP: 120/56 (04 Feb 2022 05:09) (107/54 - 134/66)  BP(mean): --  ABP: --  ABP(mean): --  RR: 17 (04 Feb 2022 05:09) (17 - 18)  SpO2: 95% (04 Feb 2022 05:09) (95% - 96%)        Physical Exam:    General: AAOx3, NAD, resting comfortably in bed.    Left elbow: +Posterior splint intact. Skin is spink, warm and tender over the fracture site. Skin is intact with bone tenting. no open lesions. Decreased ROM left elbow 2* to pain. +tricipital weakness noted against resistance  compartments soft. +ecchymoses noted. good cap refill 5/5  strength. FROM of the wrist and fingers. NT over the left shoulder and c-spine. good cap refill. Rad/uln/median nerves intact.     Left Hip:   Skin is pink and warm. Tender at the fracture site. Decreased ROM of the affected hip due to pain. SILT.  Positive logroll test.  Lower extremity:  No calf tenderness, calves are soft. 2+pulses. NVI. 5/5 Strength of EHL/TA/gastrocnemius B/L.  Good capillary refill. Warm, well-perfused.     Labs:                                      9.3    19.38 )-----------( 217      ( 04 Feb 2022 07:03 )             28.1   02-04    140  |  110<H>  |  18  ----------------------------<  90  3.7   |  25  |  0.53    Ca    8.1<L>      04 Feb 2022 07:03  Phos  2.4     02-04  Mg     2.5     02-04    TPro  4.8<L>  /  Alb  1.9<L>  /  TBili  0.6  /  DBili  x   /  AST  60<H>  /  ALT  40  /  AlkPhos  58  02-04          RADIOLOGY:     < from: CT Abdomen and Pelvis w/ IV Cont (02.03.22 @ 01:14) >    ******PRELIMINARY REPORT******      ******PRELIMINARY REPORT******       ACC: [id number] EXAM:  CT ABDOMEN AND PELVIS IC                          PROCEDURE DATE:  02/03/2022    ******PRELIMINARY REPORT******      ******PRELIMINARY REPORT******           INTERPRETATION:  Large pelvic malignant appearing mass uncertain primary   - bladder v gyn v bowel.  heterogenous ossesous appearance concerning for mets although ostepenia   may have this appearance  chronic appearing multilevel thoracic and lumbar compression frxs    see separate pelvis ct dication for left femur findings        ******PRELIMINARY REPORT******      ******PRELIMINARY REPORT******         MARTHA LINK MD; Attending Radiologist    < end of copied text >      < from: Xray Hip w/ Pelvis 2 or 3 Views, Left (02.02.22 @ 15:05) >    ACC: [id number] EXAM:  XR HIP WITH PELV 2-3V LT                          PROCEDURE DATE:  02/02/2022          INTERPRETATION:  Pelvis and left hip    HISTORY: Patient fell    Rotated view of the pelvis shows no evidence of fracture nor destructive   change of the intrinsic bones of the pelvis.    Three views of the left hip show angulated fracture of the femoral neck   with shortening of the femur.    IMPRESSION: Femoral neck fracture.    < from: MR Pelvis Bony Only No Cont (02.03.22 @ 18:28) >  ACC: [id number] EXAM:  MR PELVIS BONY ONLY                        ACC: [id number] EXAM:  MR FEMUR LT                          PROCEDURE DATE:  02/03/2022          INTERPRETATION:  EXAMINATION: MR FEMUR LEFT, MR PELVIS BONY    CLINICAL INDICATION:Left femur fracture. Evaluate for underlying lesion.    COMPARISON: CT abdomen pelvis 2/3/2022, CT pelvis and hip radiographs   dated 2/2/2022    TECHNIQUE: MRI of the left femur and bony pelvis was performed without   intravenous contrast.    INTERPRETATION:    Bone and bone marrow: There is prominence of the bone marrow in the   pelvis and lower lumbar spine.    There is a chronic, healed deformity of the sacrum. There is patchy bone   marrow edema within the sacrum at the level of S2 which likely represents   an insufficiency fracture. There is an angulated intertrochanteric   fracture of the left proximal femur with surrounding marrow edema,   without an underlying marrow replacing lesion or findings to suggest   pathological fracture. Partiallyimaged lower lumbar spine demonstrates   healed compression fractures as seen on prior CT.    Soft tissues: There are post traumatic changes in the soft tissues   surrounding the left hip region with edema within the iliopsoas muscle,   with tail musculature and adductor compartment as well as the hamstring.   There is a layering hematoma within the greater trochanteric bursa. This   measures approximately 3.2 x 6.6 x 7 cm. There is extensive muscle strain   in the proximal left thigh.    Other: There is a Verduzco catheter in the bladder. There is a large T2   intermediate mass in the pelvis intimately associated with the plantar   which has been previously described on CT measuring at least 11.7 x 9.9 x   8.8 cm highly concerning for malignancy.    IMPRESSION:    1. Left intertrochanteric fracture without underlying bony lesion at the   site of fracture. Varus angulation is present. Extensive soft tissue   changes including extensive muscle edema as detailed above, and 7 cm   layering hematoma in the region of the greater trochanteric bursa.    2. Sacral insufficiency fracture, a finding superimposed on chronic   healed deformity.    3. No convincing evidence of bony metastatic disease.    4. Large 11.7 cm pelvic mass previously described on abdominopelvic CT   highly concerning for neoplasm. This is intimately associated with the   bladder but could arise from other pelvic anatomy.          Impression:  89yFemale  - Left Hip Intertrochanteric Fracture  - Left olecranon fracture    Plan:  #LEFT HIP FRACTURE  - R/o Pathologic fracture, R/o metastatic lesions left femur     > MRI performed      > Bone scan to assess for metastatic lesions (of the whole body) to rule out metastatic lesions  -  Pain management  -  Dvt prophylaxis with Venodynes/heparin sq  5000u [id number]     > May need transfusion of prbc as needed to keep patient hemodynamically stable          - If the patient is a surgical candidate, will need h/h >10/30. Will determine surgical candidacy once all imaging, mentioned above. We need more data.      > Verduzco cath to monitor i/o's - Plan as per UROLOGY  -  Procedure planned:         > Left hip IM nailing AND ORIF Left olecranon       > Moderate Blood loss intra-op is expected. Dr Majano requesting to keep H/h above 10/30 prior to surgery.        > Will need blood cultures to rule out bacteremia prior to surgery - concern for leukocytosis    -  Medical Optimization  -  Consent: verbal consent  -  case dw Dr Majano  - Fracture care, bedrest. NWB of the affected extremity.    #UTI  - c/w ROCEPHIN IV for UTI as per medicine    #?INFILTRATE on CXR  - Plan as per medicine

## 2022-02-05 NOTE — CONSULT NOTE ADULT - ASSESSMENT
89 y/o with remote h/o breast cancer s/p left mastectomy otherwise still independent and able to perform ADL admit for fall with multiple fractures and also was found to have a large mass in pelvic    large pelvic mass   likely bladder mass   also possible other origin   on case   will check tumor markers   may need tissue diagnosis     elevated WBC   likely due to stress and known URI   on iv antibiotics   treading down   b/c/s negative so far     anemia   normocytic   likely due to multifactorial   will check anemia workup   currently mild hematuria with her forley   HB treading up     multiple fractures due to fall   otho for possible surgical fixation   no objection for surgery if medical team clear her for surgery   will followup   please call for [phone]

## 2022-02-05 NOTE — PROGRESS NOTE ADULT - ATTENDING COMMENTS
Patient was seen this AM; She was feeling well, eating.  Aware that she likely has cancer in the pelvis.   Vital Signs Last 24 Hrs  T(C): 37 (05 Feb 2022 13:17), Max: 37.1 (04 Feb 2022 19:40)  T(F): 98.6 (05 Feb 2022 13:17), Max: 98.8 (05 Feb 2022 05:54)  HR: 92 (05 Feb 2022 13:17) (77 - 92)  BP: 129/69 (05 Feb 2022 13:17) (108/55 - 148/62)  BP(mean): 67 (04 Feb 2022 21:12) (67 - 67)  RR: 17 (05 Feb 2022 13:17) (16 - 18)  SpO2: 96% (05 Feb 2022 13:17) (96% - 97%)  PE, labs, as above.       < from: MR Femur No Cont, Left (02.03.22 @ 18:27) >      1. Left intertrochanteric fracture without underlying bony lesion at the   site of fracture. Varus angulation is present. Extensive soft tissue   changes including extensive muscle edema as detailed above, and 7 cm   layering hematoma in the region of the greater trochanteric bursa.    2. Sacral insufficiency fracture, a finding superimposed on chronic   healed deformity.    3. No convincing evidence of bony metastatic disease.    4. Large 11.7 cm pelvic mass previously described on abdominopelvic CT   highly concerning for neoplasm. This is intimately associated with the   bladder but could arise from other pelvic anatomy.    < end of copied text >    n< from: NM Bone Imaging Total (02.04.22 @ 11:13) >    FINDINGS: There is kyphoscoliosis, with heterogeneous radionuclide   distribution in the spine, probably due to degenerative disease. There is   physiologic distribution of radiotracer in the remainder of the   visualized osseous structures. No abnormal increased radionuclide   accumulation is seen in the left hip region. There is asymmetrical mild,   diffuse soft tissue activity/edema of the visualized left upper   extremity, the left elbow is not included in the field-of-view.    Both kidneys are visualized. Urinary bladder activity is confined to the   left side. Activity is present within the urinary drainage tubing.    IMPRESSION: Bone scan:    Kyphoscoliosis with degenerative changes in the spine.    No evidenceto suggest the presence of osseous metastasis.    No abnormal increased uptake in the left hip region.    < end of copied text >  Heme/Onc consultation, seen and appreciated.   IMP:  Left intertrochanteric hip fracture after a fall.          Pelvic mass, associated with the bladder.  No evidence of elvin metastases.           Leukocytosis, likely reactive.  Anemia, Stable.   Plan: Operative repair of the left hip fracture, followed by surgical repair of olecranon.           Cysto-retrograde after Orthopedics surgeries.

## 2022-02-05 NOTE — PROGRESS NOTE ADULT - SUBJECTIVE AND OBJECTIVE BOX
MARCOS GUEVARA  MRN-[id number]    ORTHOPEDICS    Diagnosis:    - Left Hip Intertrochanteric Fracture  - Left Olecranon fx (closed)  - large pelvis mass on CT pelvis w/ IVC    24hr interval:  - large pelvic mass, suspicious for malignant lesion       Pt seen and eval today. Pain controlled of left hip.  Left hip pain rated 3/10, non-radiating, controlled with pain meds  Patient had MRI and Bone scan showing no bony lesions   Pt denies Chest pain, SOB, dyspnea, paresthesias, N/V/D, abdominal pain, syncope, or pain anywhere else.     ICU Vital Signs Last 24 Hrs  T(C): 37.1 (05 Feb 2022 05:54), Max: 37.1 (04 Feb 2022 16:15)  T(F): 98.8 (05 Feb 2022 05:54), Max: 98.8 (05 Feb 2022 05:54)  HR: 78 (05 Feb 2022 05:54) (76 - 82)  BP: 148/62 (05 Feb 2022 05:54) (108/55 - 148/62)  BP(mean): 67 (04 Feb 2022 21:12) (67 - 67)  ABP: --  ABP(mean): --  RR: 16 (05 Feb 2022 05:54) (16 - 18)  SpO2: 96% (05 Feb 2022 05:54) (96% - 98%)    Physical Exam:    General: AAOx3, NAD, resting comfortably in bed.    Left elbow: +Posterior splint intact. Skin is spink, warm and tender over the fracture site. Skin is intact with bone tenting. no open lesions. Decreased ROM left elbow 2* to pain. +tricipital weakness noted against resistance  compartments soft. +ecchymoses noted. good cap refill 5/5  strength. FROM of the wrist and fingers. NT over the left shoulder and c-spine. good cap refill. Rad/uln/median nerves intact.     Left Hip:   Skin is pink and warm. Tender at the fracture site. Decreased ROM of the affected hip due to pain. SILT.  Positive logroll test.  Lower extremity:  No calf tenderness, calves are soft. 2+pulses. NVI. 5/5 Strength of EHL/TA/gastrocnemius B/L.  Good capillary refill. Chronic skin changes noted to left tibial region    Labs:                         10.9   21.08 )-----------( 224      ( 05 Feb 2022 07:30 )             31.2     02-05    141  |  111<H>  |  24<H>  ----------------------------<  100<H>  3.9   |  24  |  0.60    Ca    8.0<L>      05 Feb 2022 07:30  Phos  2.4     02-04  Mg     2.5     02-04    TPro  4.9<L>  /  Alb  1.9<L>  /  TBili  0.5  /  DBili  x   /  AST  36  /  ALT  36  /  AlkPhos  60  02-05            RADIOLOGY:     < from: CT Abdomen and Pelvis w/ IV Cont (02.03.22 @ 01:14) >    ******PRELIMINARY REPORT******      ******PRELIMINARY REPORT******       ACC: [id number] EXAM:  CT ABDOMEN AND PELVIS IC                          PROCEDURE DATE:  02/03/2022    ******PRELIMINARY REPORT******      ******PRELIMINARY REPORT******           INTERPRETATION:  Large pelvic malignant appearing mass uncertain primary   - bladder v gyn v bowel.  heterogenous ossesous appearance concerning for mets although ostepenia   may have this appearance  chronic appearing multilevel thoracic and lumbar compression frxs    see separate pelvis ct dication for left femur findings        ******PRELIMINARY REPORT******      ******PRELIMINARY REPORT******         MARTHA LINK MD; Attending Radiologist    < end of copied text >      < from: Xray Hip w/ Pelvis 2 or 3 Views, Left (02.02.22 @ 15:05) >    ACC: [id number] EXAM:  XR HIP WITH PELV 2-3V LT                          PROCEDURE DATE:  02/02/2022          INTERPRETATION:  Pelvis and left hip    HISTORY: Patient fell    Rotated view of the pelvis shows no evidence of fracture nor destructive   change of the intrinsic bones of the pelvis.    Three views of the left hip show angulated fracture of the femoral neck   with shortening of the femur.    IMPRESSION: Femoral neck fracture.    < from: MR Pelvis Bony Only No Cont (02.03.22 @ 18:28) >  ACC: [id number] EXAM:  MR PELVIS BONY ONLY                        ACC: [id number] EXAM:  MR FEMUR LT                          PROCEDURE DATE:  02/03/2022          INTERPRETATION:  EXAMINATION: MR FEMUR LEFT, MR PELVIS BONY    CLINICAL INDICATION:Left femur fracture. Evaluate for underlying lesion.    COMPARISON: CT abdomen pelvis 2/3/2022, CT pelvis and hip radiographs   dated 2/2/2022    TECHNIQUE: MRI of the left femur and bony pelvis was performed without   intravenous contrast.    INTERPRETATION:    Bone and bone marrow: There is prominence of the bone marrow in the   pelvis and lower lumbar spine.    There is a chronic, healed deformity of the sacrum. There is patchy bone   marrow edema within the sacrum at the level of S2 which likely represents   an insufficiency fracture. There is an angulated intertrochanteric   fracture of the left proximal femur with surrounding marrow edema,   without an underlying marrow replacing lesion or findings to suggest   pathological fracture. Partiallyimaged lower lumbar spine demonstrates   healed compression fractures as seen on prior CT.    Soft tissues: There are post traumatic changes in the soft tissues   surrounding the left hip region with edema within the iliopsoas muscle,   with tail musculature and adductor compartment as well as the hamstring.   There is a layering hematoma within the greater trochanteric bursa. This   measures approximately 3.2 x 6.6 x 7 cm. There is extensive muscle strain   in the proximal left thigh.    Other: There is a Verduzco catheter in the bladder. There is a large T2   intermediate mass in the pelvis intimately associated with the plantar   which has been previously described on CT measuring at least 11.7 x 9.9 x   8.8 cm highly concerning for malignancy.    IMPRESSION:    1. Left intertrochanteric fracture without underlying bony lesion at the   site of fracture. Varus angulation is present. Extensive soft tissue   changes including extensive muscle edema as detailed above, and 7 cm   layering hematoma in the region of the greater trochanteric bursa.    2. Sacral insufficiency fracture, a finding superimposed on chronic   healed deformity.    3. No convincing evidence of bony metastatic disease.    4. Large 11.7 cm pelvic mass previously described on abdominopelvic CT   highly concerning for neoplasm. This is intimately associated with the   bladder but could arise from other pelvic anatomy.      < from: NM Bone Imaging Total (02.04.22 @ 11:13) >    ACC: [id number] EXAM:  NM BONE IMG WHOLE BODY                          PROCEDURE DATE:  02/04/2022          INTERPRETATION:  CLINICAL INFORMATION: 89-year-old female neoplastic   pelvic mass, status post fall with left hip and left elbow fractures.   Evaluate for bony metastasis.    RADIOPHARMACEUTICAL: 20.1 mCi Tc-99m HDP, I.V.    TECHNIQUE:  Whole-body images were obtained in the anterior and posterior   projections approximately 2-3 hours following administration of   radiotracer.    COMPARISON: No prior bone scan.    FINDINGS: There is kyphoscoliosis, with heterogeneous radionuclide   distribution in the spine, probably due to degenerative disease. There is   physiologic distribution of radiotracer in the remainder of the   visualized osseous structures. No abnormal increased radionuclide   accumulation is seen in the left hip region. There is asymmetrical mild,   diffuse soft tissue activity/edema of the visualized left upper   extremity, the left elbow is not included in the field-of-view.    Both kidneys are visualized. Urinary bladder activity is confined to the   left side. Activity is present within the urinary drainage tubing.    IMPRESSION: Bone scan:    Kyphoscoliosis with degenerative changes in the spine.    No evidenceto suggest the presence of osseous metastasis.    No abnormal increased uptake in the left hip region.      Impression:  89yFemale  - Left Hip Intertrochanteric Fracture  - Left olecranon fracture    Plan:  #LEFT HIP FRACTURE  - Keep NPO after midnight  - Hold all anticoagulation tomorrow for surgery   - R/o Pathologic fracture, R/o metastatic lesions left femur     > MRI performed      > Bone scan to assess for metastatic lesions (of the whole body) to rule out metastatic lesions - negative   -  recommend  Hematology/oncology consult  -  Pain management  -  Dvt prophylaxis with Venodynes/heparin sq  5000u q8hour     > Verduzco cath to monitor i/o's - Plan as per UROLOGY  -  Procedure planned:         > Left hip IM nailing tomorrow AND ORIF Left olecranon on 2/8/22       > blood cultures negative   -  Medical Optimization  -  Consent: verbal consent  -  case dw Dr Majano  - Fracture care, bedrest. NWB of the affected extremity.    #UTI  - Patient completed 3 day course of rocephin yesterday  - D/w Dr. Tomlinson concern of leukocytosis and risk of infection to the prosthesis     > Discussed possible need for a repeat UA/UCx to determine if UTI has resolved or if additional treatment is required

## 2022-02-05 NOTE — PROGRESS NOTE ADULT - SUBJECTIVE AND OBJECTIVE BOX
Source of information: MARCOS STONE, Chart review  Patient language: English  : n/a    HPI:  Pt is a 89 yo F lives alone at home, independent, ambulates sometimes with a rolator, baseline AAOx2-3 with PMH of Urinary incontinence, HTN and PSH of left mastectomy (left breast CA 30 yrs ago) presents to the ED after she had a fall this morning at home. Pt was going to the bathroom around 3am and on the way tripped between her legs and fell down. She couldn't stand up after the incident and called for help. She did not hit her head on the floor or lose consciousness. Her neighbor heard her around 7am and helped her to get up and called EMS to come to the hospital. No h/o fever, headache, dizziness, abdominal pain, N/V. Pt was prescribed yesterday Solifenacin for urinary incontinence. No recent travel/illness.  (02 Feb 2022 18:03)      [facility]  + left hip fracture. + left elbow fx MRI neg for bony mets. bone scan negative for osseous mets. Likely OR 2/7.  Pain consulted for left hip pain. Pt seen and examined at bedside. Pt laying in bed, denies pain. Reports left hip pain only upon turning or leaning on left side. Current pain regimen effectively managing patient's pain. + left arm brace and sling in place.  Pt tolerating PO diet. Pt denies lethargy, nausea, vomiting, constipation and itchiness. Reports last BM 2/5. Patient stated goal for pain control: to be able to take deep breaths, get out of bed to chair and ambulate with tolerable pain control once postop.     PAST MEDICAL & SURGICAL HISTORY:  Urinary frequency    Hypertension        FAMILY HISTORY:      Social History:   [X ]Denies ETOH use, illicit drug use, and smoking     Allergies    No Known Allergies    MEDICATIONS  (STANDING):  heparin   Injectable 5000 Unit(s) SubCutaneous every 8 hours  pantoprazole    Tablet 40 milliGRAM(s) Oral before breakfast  sodium chloride 0.9%. 1000 milliLiter(s) (80 mL/Hr) IV Continuous <Continuous>    MEDICATIONS  (PRN):  acetaminophen     Tablet .. 975 milliGRAM(s) Oral every 8 hours PRN Mild Pain (1 - 3)  morphine  - Injectable 1 milliGRAM(s) IV Push every 4 hours PRN Severe Pain (7 - 10)  oxyCODONE    IR 2.5 milliGRAM(s) Oral every 4 hours PRN Moderate Pain (4 - 6)      Vital Signs Last 24 Hrs  T(C): 37.1 (05 Feb 2022 05:54), Max: 37.1 (04 Feb 2022 16:15)  T(F): 98.8 (05 Feb 2022 05:54), Max: 98.8 (05 Feb 2022 05:54)  HR: 78 (05 Feb 2022 05:54) (76 - 82)  BP: 148/62 (05 Feb 2022 05:54) (108/55 - 148/62)  BP(mean): 67 (04 Feb 2022 21:12) (67 - 67)  RR: 16 (05 Feb 2022 05:54) (16 - 18)  SpO2: 96% (05 Feb 2022 05:54) (96% - 98%)  COVID-19 PCR: NotDetec (02 Feb 2022 12:27)    LABS: Reviewed                          10.9   21.08 )-----------( 224      ( 05 Feb 2022 07:30 )             31.2     02-05    141  |  111<H>  |  24<H>  ----------------------------<  100<H>  3.9   |  24  |  0.60    Ca    8.0<L>      05 Feb 2022 07:30  Phos  2.4     02-04  Mg     2.5     02-04    TPro  4.9<L>  /  Alb  1.9<L>  /  TBili  0.5  /  DBili  x   /  AST  36  /  ALT  36  /  AlkPhos  60  02-05    PT/INR - ( 05 Feb 2022 07:30 )   PT: 12.1 sec;   INR: 1.02 ratio         PTT - ( 05 Feb 2022 07:30 )  PTT:31.0 sec  LIVER FUNCTIONS - ( 05 Feb 2022 07:30 )  Alb: 1.9 g/dL / Pro: 4.9 g/dL / ALK PHOS: 60 U/L / ALT: 36 U/L DA / AST: 36 U/L / GGT: x             CAPILLARY BLOOD GLUCOSE        COVID-19 PCR: NotDetec (02 Feb 2022 12:27)      Radiology: Reviewed  < from: NM Bone Imaging Total (02.04.22 @ 11:13) >    ACC: [id number] EXAM:  NM BONE IMG WHOLE BODY                          PROCEDURE DATE:  02/04/2022          INTERPRETATION:  CLINICAL INFORMATION: 89-year-old female neoplastic   pelvic mass, status post fall with left hip and left elbow fractures.   Evaluate for bony metastasis.    RADIOPHARMACEUTICAL: 20.1 mCi Tc-99m HDP, I.V.    TECHNIQUE:  Whole-body images were obtained in the anterior and posterior   projections approximately 2-3 hours following administration of   radiotracer.    COMPARISON: No prior bone scan.    FINDINGS: There is kyphoscoliosis, with heterogeneous radionuclide   distribution in the spine, probably due to degenerative disease. There is   physiologic distribution of radiotracer in the remainder of the   visualized osseous structures. No abnormal increased radionuclide   accumulation is seen in the left hip region. There is asymmetrical mild,   diffuse soft tissue activity/edema of the visualized left upper   extremity, the left elbow is not included in the field-of-view.    Both kidneys are visualized. Urinary bladder activity is confined to the   left side. Activity is present within the urinary drainage tubing.    IMPRESSION: Bone scan:    Kyphoscoliosis with degenerative changes in the spine.    No evidenceto suggest the presence of osseous metastasis.    No abnormal increased uptake in the left hip region.    --- End of Report ---            PARK OLMOS MD; Attending Nuclear Medicine  This document has been electronically signed. Feb 4 2022 11:53AM    < end of copied text >    < from: CT Abdomen and Pelvis w/wo IV Cont (02.04.22 @ 15:00) >    ACC: [id number] EXAM:  CT ABDOMEN AND PELVIS [facility] IC                          PROCEDURE DATE:  02/04/2022          INTERPRETATION:  CLINICAL INFORMATION: 89 years  Female with pelvic mass      Pelvic mass.    ADDITIONAL CLINICAL INFORMATION: Disorder of the urinary system N39.9    COMPARISON: 2/3/2022    CONTRAST/COMPLICATIONS:  IV Contrast: Omnipaque 350  90 cc administered   10 cc discarded  Oral Contrast: NONE  Complications: None reported at time of study completion    PROCEDURE:  CT of the Abdomen and Pelvis was performed.  Precontrast, Nephrographic and Excretory phases were acquired (CT   UROGRAM).    Sagittal and coronal reformats were performed.    FINDINGS:  LOWER CHEST: Bilateral lower lobe dependent atelectasis.    LIVER: Within normal limits.  BILE DUCTS: Normal caliber.  GALLBLADDER: Vicarious excretion of contrast in the gallbladder  SPLEEN: 5.6 x 4.0 cm rim calcified cyst.  PANCREAS: Within normal limits.  ADRENALS: Within normal limits.  KIDNEYS/URETERS: 2.7 cm left midpole cyst. 1.7 cm right lower pole cyst.   Bilateral hypodensities too small to characterize. No   hydroureteronephrosis. Symmetrical nephrograms.    BLADDER: Collapsed around Verduzco catheter. 10.7 x 9.7 x 10.0 cm complex   cystic pelvic mass with areas of internal soft tissue. Intraluminal   contrast extends around the medial margin of the mass suggesting bladder   etiology.  REPRODUCTIVE ORGANS: Uterus not visualized.    BOWEL: No bowel obstruction. Appendix is not visualized and cannot be   assessed. Sigmoid diverticulosis without diverticulitis. Retained fecal   matter throughout the colon.  PERITONEUM: Small pelvic ascites.  VESSELS: Atherosclerotic changes.  RETROPERITONEUM/LYMPH NODES: No lymphadenopathy.  ABDOMINAL WALL: 4.0 x 1.6 cm rightabdominal wall subcutaneous cyst.   Increasing subcutaneous edema overlying the left hip.  BONES: Comminuted angulated left intertrochanteric femoral neck fracture.   Avulsion of the lesser trochanter. Chronic healed left superior and   inferior pubic rami fracture. Chronic right sacral deformity. Severe T12   compression fracture. Moderate L2 compression fracture. Osteoporosis.    IMPRESSION:  Reidentified complex cystic pelvic mass effaces the bladder lumen.   Suspect bladder etiology. However,ovarian or uterine etiology cannot be   entirely excluded.    Comminuted angulated displaced left hip fracture with increasing   overlying subcutaneous edema.        --- End of Report ---            NICK RIVAS MD; Attending Radiologist  This document has been electronically signed. Feb 4 2022  3:53PM    < end of copied text >    < from: Xray Elbow AP + Lateral + Oblique, Left (02.02.22 @ 12:10) >    ACC: [id number] EXAM:  XR ELBOW COMP MIN 3V LT                          PROCEDURE DATE:  02/02/2022          INTERPRETATION:  Left elbow    HISTORY: Patient fell     Three views of the left elbow show comminuted fracture of the olecranon   process with dislocation of the proximal olecranon along the distal   humerus. The radial head is in normal apposition with the distal humerus.    IMPRESSION: Olecranon fracture.      Thank you for this referral.    --- End of Report ---            SAVANNAH KOWALSKI MD; Attending Interventional Radiologist  This document has been electronically signed. Feb 2 2022  1:40PM    < end of copied text >      ORT Score -   Family Hx of substance abuse	Female	      Male  Alcohol 	                                           1                     3  Illegal drugs	                                   2                     3  Rx drugs                                           4 	                  4  Personal Hx of substance abuse		  Alcohol 	                                          3	                  3  Illegal drugs                                     4	                  4  Rx drugs                                            5 	                  5  Age between 16- 45 years	           1                     1  hx preadolescent sexual abuse	   3 	                  0  Psychological disease		  ADD, OCD, bipolar, schizophrenia   2	          2  Depression                                           1 	          1  Total: 0    a score of 3 or lower indicates low risk for opioid abuse		  a score of 4-7 indicates moderate risk for opioid abuse		  a score of 8 or higher indicates high risk for opioid abuse    REVIEW OF SYSTEMS:  CONSTITUTIONAL: No fever or fatigue  HEENT:  No difficulty hearing, no change in vision  NECK: No pain or stiffness  RESPIRATORY: No cough, wheezing, chills or hemoptysis; No shortness of breath  CARDIOVASCULAR: No chest pain, palpitations, dizziness, or leg swelling  GASTROINTESTINAL: No loss of appetite, decreased PO intake. No abdominal or epigastric pain. No nausea, vomiting; No diarrhea or constipation.   GENITOURINARY: No dysuria, frequency, hematuria, retention or incontinence  MUSCULOSKELETAL: + decreased ROM left arm and left leg; + occasional left arm and left hip joint pain. + left hip swelling; no upper or lower motor strength weakness, + bowel/bladder incontinence, +falls   NEURO: No headaches, No numbness/tingling b/l LE    PHYSICAL EXAM:  GENERAL:  Alert & Oriented X4, cooperative, NAD, Good concentration. Speech is clear.   RESPIRATORY: Respirations even and unlabored. Clear to auscultation bilaterally; No rales, rhonchi, wheezing, or rubs  CARDIOVASCULAR: Normal S1/S2, regular rate and rhythm; No murmurs, rubs, or gallops. No JVD.   GASTROINTESTINAL:  Soft, Nontender, Nondistended; Bowel sounds present  PERIPHERAL VASCULAR:  Extremities warm with + left hip edema. 2+ Peripheral Pulses, No cyanosis, No calf tenderness  MUSCULOSKELETAL: Motor Strength 5/5 B/L upper and lower extremities; + decreased ROM left upper and lower extremity + left hip tenderness on palpation of all joints. + left LE shortened and externally rotated, + left arm sling and brace in place.   SKIN: Warm, dry, intact. No rashes, lesions, scars or wounds.     Risk factors associated with adverse outcomes related to opioid treatment  [ ]  Concurrent benzodiazepine use  [ ]  History/ Active substance use or alcohol use disorder  [ ] Psychiatric co-morbidity  [ ] Sleep apnea  [ ] COPD  [ ] BMI> 35  [ ] Liver dysfunction  [ ] Renal dysfunction  [ ] CHF  [ ] Smoker  [ X]  Age > 60 years    [X ]  [place]  Reviewed and Copied to Chart. See below.    Plan of care and goal oriented pain management treatment options were discussed with patient and /or primary care giver; all questions and concerns were addressed and care was aligned with patient's wishes.    Educated patient on goal oriented pain management treatment options     02-05-22 @ 11:52

## 2022-02-05 NOTE — PROGRESS NOTE ADULT - ASSESSMENT
89 yo female with gross hematuria presenting after fall with hip and left olecranon fracture.    -- CT urogram images reviewed demonstrating large pelvic mass of uncertain pathology, however suspicious for bladder primary  -- labs reviewed  -- Patient continues to report that she does not believe that she has cancer despite discussing the very high likelihood of malignancy given findings on CT  -- Again discussed concern for likely malignancy with patient who states that "this is not cancer"   -- Patient refusing cystoscopy at this time  -- f/u urine culture  -- continue antibiotics  -- continue jalloh  -- f/u oncologic workup   -- recommend goals of care discussion   -- will follow   -- Discussed with PA staff

## 2022-02-05 NOTE — PROGRESS NOTE ADULT - ASSESSMENT
Home    My Content    Search   Help   Log out  Prescription Monitoring Program Registry  Welcome Nicky Lamb×   Help  ×An MME Calculator is now available by clicking the MME tab on the purple task bar.  An informational sheet detailing this enhancement may be found by clicking ‘MME Calculator Help’ on the MME Calculator page.   This informational tool is a resource and is not meant for direct clinical application.   Patient Search   Multi-Patient Search   MME Calculator   Reports   Drug List   Designation   My MIGUELITO #  Data Detail Level: Printer-Friendly View Extended View  Confidential Drug Utilization Report  Search Terms: gareth yuriy, 10/08/1929Search Date: 02/04/2022 11:00:22 AM  The Drug Utilization Report below displays all of the controlled substance prescriptions, if any, that your patient has filled in the last twelve months. The information displayed on this report is compiled from pharmacy submissions to the Department, and accurately reflects the information as submitted by the pharmacies.    This report was requested by: Nicky Lamb | Reference #: [id number]    There are no results for the search terms that you entered.

## 2022-02-05 NOTE — PROGRESS NOTE ADULT - SUBJECTIVE AND OBJECTIVE BOX
Patient seen/examined. Still reports that she does not believe that she has cancer.    Vital Signs Last 24 Hrs  T(C): 37.1 (05 Feb 2022 05:54), Max: 37.1 (04 Feb 2022 16:15)  T(F): 98.8 (05 Feb 2022 05:54), Max: 98.8 (05 Feb 2022 05:54)  HR: 78 (05 Feb 2022 05:54) (76 - 82)  BP: 148/62 (05 Feb 2022 05:54) (108/55 - 148/62)  BP(mean): 67 (04 Feb 2022 21:12) (67 - 67)  RR: 16 (05 Feb 2022 05:54) (16 - 18)  SpO2: 96% (05 Feb 2022 05:54) (96% - 98%)    General: NAD. AAOx3  Abd: soft. NT/ND  : Urine clear                          10.9   21.08 )-----------( 224      ( 05 Feb 2022 07:30 )             31.2     02-05    141  |  111<H>  |  24<H>  ----------------------------<  100<H>  3.9   |  24  |  0.60    Ca    8.0<L>      05 Feb 2022 07:30  Phos  2.4     02-04  Mg     2.5     02-04    TPro  4.9<L>  /  Alb  1.9<L>  /  TBili  0.5  /  DBili  x   /  AST  36  /  ALT  36  /  AlkPhos  60  02-05        ACC: [id number] EXAM:  CT ABDOMEN AND PELVIS [facility]                          PROCEDURE DATE:  02/04/2022          INTERPRETATION:  CLINICAL INFORMATION: 89 years  Female with pelvic mass      Pelvic mass.    ADDITIONAL CLINICAL INFORMATION: Disorder of the urinary system N39.9    COMPARISON: 2/3/2022    CONTRAST/COMPLICATIONS:  IV Contrast: Omnipaque 350  90 cc administered   10 cc discarded  Oral Contrast: NONE  Complications: None reported at time of study completion    PROCEDURE:  CT of the Abdomen and Pelvis was performed.  Precontrast, Nephrographic and Excretory phases were acquired (CT   UROGRAM).    Sagittal and coronal reformats were performed.    FINDINGS:  LOWER CHEST: Bilateral lower lobe dependent atelectasis.    LIVER: Within normal limits.  BILE DUCTS: Normal caliber.  GALLBLADDER: Vicarious excretion of contrast in the gallbladder  SPLEEN: 5.6 x 4.0 cm rim calcified cyst.  PANCREAS: Within normal limits.  ADRENALS: Within normal limits.  KIDNEYS/URETERS: 2.7 cm left midpole cyst. 1.7 cm right lower pole cyst.   Bilateral hypodensities too small to characterize. No   hydroureteronephrosis. Symmetrical nephrograms.    BLADDER: Collapsed around Verduzco catheter. 10.7 x 9.7 x 10.0 cm complex   cystic pelvic mass with areas of internal soft tissue. Intraluminal   contrast extends around the medial margin of the mass suggesting bladder   etiology.  REPRODUCTIVE ORGANS: Uterus not visualized.    BOWEL: No bowel obstruction. Appendix is not visualized and cannot be   assessed. Sigmoid diverticulosis without diverticulitis. Retained fecal   matter throughout the colon.  PERITONEUM: Small pelvic ascites.  VESSELS: Atherosclerotic changes.  RETROPERITONEUM/LYMPH NODES: No lymphadenopathy.  ABDOMINAL WALL: 4.0 x 1.6 cm rightabdominal wall subcutaneous cyst.   Increasing subcutaneous edema overlying the left hip.  BONES: Comminuted angulated left intertrochanteric femoral neck fracture.   Avulsion of the lesser trochanter. Chronic healed left superior and   inferior pubic rami fracture. Chronic right sacral deformity. Severe T12   compression fracture. Moderate L2 compression fracture. Osteoporosis.    IMPRESSION:  Reidentified complex cystic pelvic mass effaces the bladder lumen.   Suspect bladder etiology. However,ovarian or uterine etiology cannot be   entirely excluded.    Comminuted angulated displaced left hip fracture with increasing   overlying subcutaneous edema.        --- End of Report ---            NICK RIVAS MD; Attending Radiologist  This document has been electronically signed. Feb 4 2022  3:53PM

## 2022-02-05 NOTE — PROGRESS NOTE ADULT - PROBLEM SELECTOR PLAN 1
[facility]. PT with acute left hip pain which is somatic in nature due to s/p fall + left hip fracture.  MRI neg for bony mets. bone scan negative for osseous mets. Likely OR 2/7.   High risk medications reviewed. Avoid polypharmacy. Avoid IV opioids. Avoid NSAIDs and benzodiazepines. Non-pharmacological sleep aides initiated. Non-opioid medications and non-pharmacological pain management measures initiated.  Routine Meds  - Acetaminophen 975mg po q 8 hours prn  Mild Pain ( Pain Level - 1-3)  - Non - Pharmacological Measures  Moderate Pain (Pain Level 4-6)  - Oxycodone 2.5mg po q 4 hours prn  Severe Pain ( Pain Level - 7-10)  - morphine 1mg ivp q 4 hours prn   Bowel Regimen   - Senna and Miralax  OR when optimizied.

## 2022-02-05 NOTE — CONSULT NOTE ADULT - SUBJECTIVE AND OBJECTIVE BOX
Pt is a 89 yo F lives alone at home, independent, ambulates sometimes with a Rolator, baseline AAOx2-3 with PMH of Urinary incontinence, HTN and PSH of left mastectomy (left breast CA 30 yrs ago) presents to the ED after she had a fall this morning at home. No h/o fever, headache, dizziness, abdominal pain, N/V. Pt was prescribed yesterday Solifenacin for urinary incontinence. No recent travel/illness. pt underwent workup showed right LE multiple fracture and right arm fracture. She also was found to have a large bladder mass suspicious for malignancy. Her further workup showed no metastatic disease in bone ruled out pathological fracture. Her WBC was elevated since admission and was treated for UTI currently. B/C/S negative so far. Hema/onco consult for surgical clearance and further workup for new bladder mass     PAST MEDICAL & SURGICAL HISTORY:  Urinary frequency    Hypertension    Allergies    02-05    141  |  111<H>  |  24<H>  ----------------------------<  100<H>  3.9   |  24  |  0.60    Ca    8.0<L>      05 Feb 2022 07:30  Phos  2.4     02-04  Mg     2.5     02-04    TPro  4.9<L>  /  Alb  1.9<L>  /  TBili  0.5  /  DBili  x   /  AST  36  /  ALT  36  /  AlkPhos  60  02-05  No Known Allergies    Intolerances  FAMILY HISTORY:  noncontributive     MEDICATIONS  (STANDING):  heparin   Injectable 5000 Unit(s) SubCutaneous every 8 hours  pantoprazole    Tablet 40 milliGRAM(s) Oral before breakfast  sodium chloride 0.9%. 1000 milliLiter(s) (80 mL/Hr) IV Continuous <Continuous>    MEDICATIONS  (PRN):  acetaminophen     Tablet .. 975 milliGRAM(s) Oral every 8 hours PRN Mild Pain (1 - 3)  morphine  - Injectable 1 milliGRAM(s) IV Push every 4 hours PRN Severe Pain (7 - 10)  oxyCODONE    IR 2.5 milliGRAM(s) Oral every 4 hours PRN Moderate Pain (4 - 6)  CBC Full  -  ( 05 Feb 2022 07:30 )  WBC Count : 21.08 K/uL  RBC Count : 3.42 M/uL  Hemoglobin : 10.9 g/dL  Hematocrit : 31.2 %  Platelet Count - Automated : 224 K/uL  Mean Cell Volume : 91.2 fl  Mean Cell Hemoglobin : 31.9 pg  Mean Cell Hemoglobin Concentration : 34.9 gm/dL  Auto Neutrophil # : x  Auto Lymphocyte # : x  Auto Monocyte # : x  Auto Eosinophil # : x  Auto Basophil # : x  Auto Neutrophil % : x  Auto Lymphocyte % : x  Auto Monocyte % : x  Auto Eosinophil % : x  Auto Basophil % : x    02-05    141  |  111<H>  |  24<H>  ----------------------------<  100<H>  3.9   |  24  |  0.60    Ca    8.0<L>      05 Feb 2022 07:30  Phos  2.4     02-04  Mg     2.5     02-04    TPro  4.9<L>  /  Alb  1.9<L>  /  TBili  0.5  /  DBili  x   /  AST  36  /  ALT  36  /  AlkPhos  60  02-05  CT of abd and pelvic. cystic mass ? bladder origin or other   bone scan negative   MRI of pelvic showed fractures and non pathologic   CT of chest IMPRESSION:  2 left lung nodules indeterminate for metastatic disease.    T5, T7 and T12 severe compression fractures without osseous retropulsion.   Osteoporosis. Cannot exclude underlying metastatic disease.  Xray IMPRESSION: Olecranon fracture.

## 2022-02-05 NOTE — PROGRESS NOTE ADULT - SUBJECTIVE AND OBJECTIVE BOX
MEDICAL ATTENDING NOTE  Patient is a 89y old  Female who presents with a chief complaint of Left elbow and hip fracture (2022 15:43)      HPI:  Pt is a 89 yo F lives alone at home, independent, ambulates sometimes with a rolator, baseline AAOx2-3 with PMH of Urinary incontinence, HTN and PSH of left mastectomy (left breast CA 30 yrs ago) presents to the ED after she had a fall this morning at home. Pt was going to the bathroom around 3am and on the way tripped between her legs and fell down. She couldn't stand up after the incident and called for help. She did not hit her head on the floor or lose consciousness. Her neighbor heard her around 7am and helped her to get up and called EMS to come to the hospital. No h/o fever, headache, dizziness, abdominal pain, N/V. Pt was prescribed yesterday Solifenacin for urinary incontinence. No recent travel/illness.  (2022 18:03)      INTERVAL HPI/OVERNIGHT EVENTS: no new complaints; feels well; hungry;  Acknowledges that she likely has cancer.     MEDICATIONS  (STANDING):  heparin   Injectable 5000 Unit(s) SubCutaneous every 8 hours  pantoprazole    Tablet 40 milliGRAM(s) Oral before breakfast  sodium chloride 0.9%. 1000 milliLiter(s) (80 mL/Hr) IV Continuous <Continuous>    MEDICATIONS  (PRN):  acetaminophen     Tablet .. 975 milliGRAM(s) Oral every 8 hours PRN Mild Pain (1 - 3)  morphine  - Injectable 1 milliGRAM(s) IV Push every 4 hours PRN Severe Pain (7 - 10)  oxyCODONE    IR 2.5 milliGRAM(s) Oral every 4 hours PRN Moderate Pain (4 - 6)      __________________________________________________  REVIEW OF SYSTEMS:    CONSTITUTIONAL: No fever,   EYES: no acute visual disturbances  NECK: No pain or stiffness  RESPIRATORY: No cough; No shortness of breath  CARDIOVASCULAR: No chest pain, no palpitations  GASTROINTESTINAL: No pain. No nausea or vomiting; No diarrhea   NEUROLOGICAL: No headache or numbness, no tremors  MUSCULOSKELETAL: No joint pain, no muscle pain  GENITOURINARY: no dysuria, no frequency, no hesitancy  PSYCHIATRY: no depression , no anxiety  ALL OTHER  ROS negative        Vital Signs Last 24 Hrs  T(C): 37 (2022 13:17), Max: 37.1 (2022 19:40)  T(F): 98.6 (2022 13:17), Max: 98.8 (2022 05:54)  HR: 92 (:) (77 - 92)  BP: 129/69 (2022 13:17) (108/55 - 148/62)  BP(mean): 67 (2022 21:12) (67 - 67)  RR: 17 (:17) (16 - 18)  SpO2: 96% (:17) (96% - 97%)    ________________________________________________  PHYSICAL EXAM:  GENERAL: Thin, alert, elderly woman  HEENT: Normocephalic; kyphosis conjunctivae and sclerae clear; moist mucous membranes;   NECK : supple  CHEST/LUNG: Clear to auscultation bilaterally with good air entry   HEART: S1 S2  regular; no murmurs, gallops or rubs  ABDOMEN: Soft, Nontender, Nondistended; Bowel sounds present; Verduzco is draining clear urine.   EXTREMITIES: left arm in a cast.   SKIN: warm and dry; no rash  NERVOUS SYSTEM:  Awake and alert; Oriented  to place, person and time ; no new deficits    _________________________________________________  LABS:                        10.9   21.08 )-----------( 224      ( 2022 07:30 )             31.2     02-05    141  |  111<H>  |  24<H>  ----------------------------<  100<H>  3.9   |  24  |  0.60    Ca    8.0<L>      2022 07:30  Phos  2.4     02-04  Mg     2.5     02-04    TPro  4.9<L>  /  Alb  1.9<L>  /  TBili  0.5  /  DBili  x   /  AST  36  /  ALT  36  /  AlkPhos  60  02-05    PT/INR - ( 2022 07:30 )   PT: 12.1 sec;   INR: 1.02 ratio         PTT - ( 2022 07:30 )  PTT:31.0 sec  Urinalysis Basic - ( 2022 14:23 )    Color: Red / Appearance: very cloudy / S.015 / pH: x  Gluc: x / Ketone: Trace  / Bili: Negative / Urobili: Negative   Blood: x / Protein: 100 / Nitrite: Negative   Leuk Esterase: Moderate / RBC: 10-25 /HPF / WBC 6-10 /HPF   Sq Epi: x / Non Sq Epi: Negative /HPF / Bacteria: Negative /HPF

## 2022-02-06 NOTE — PHYSICAL THERAPY INITIAL EVALUATION ADULT - DIAGNOSIS, PT EVAL
(ICF Model) Pt. present w/impairments in Body Structures/Function, incl: Strength, Balance, ROM, leading to deficits in performing the below noted Activities (Limitations).

## 2022-02-06 NOTE — PROGRESS NOTE ADULT - ATTENDING COMMENTS
pt evaluated.  medically optimized for IM nailing of L IT / Subtroch fracture.  explained risks benefits alternatives to pt including but not limited to bleeing, infection, nerve damaage, loss of use of limb, loss of limb, blood clot to leg or lung, ulceration, inability to walk, leg length discrepancy, limp, non union, intraoperative fracture, heart attack, stroke or death.  pt understands and wishes to proceed

## 2022-02-06 NOTE — PROGRESS NOTE ADULT - SUBJECTIVE AND OBJECTIVE BOX
MEDICAL ATTENDING NOTE  Patient is a 89y old  Female who presents with a chief complaint of Left elbow and hip fracture (2022 17:51)      HPI:  Pt is a 89 yo F lives alone at home, independent, ambulates sometimes with a rolator, baseline AAOx2-3 with PMH of Urinary incontinence, HTN and PSH of left mastectomy (left breast CA 30 yrs ago) presents to the ED after she had a fall this morning at home. Pt was going to the bathroom around 3am and on the way tripped between her legs and fell down. She couldn't stand up after the incident and called for help. She did not hit her head on the floor or lose consciousness. Her neighbor heard her around 7am and helped her to get up and called EMS to come to the hospital. No h/o fever, headache, dizziness, abdominal pain, N/V. Pt was prescribed yesterday Solifenacin for urinary incontinence. No recent travel/illness.  (2022 18:03)      INTERVAL HPI/OVERNIGHT EVENTS: Left hip nailing was performed today.   Patient is back in her bed on the surgical floor.   MEDICATIONS  (STANDING):  acetaminophen     Tablet .. 1000 milliGRAM(s) Oral every 8 hours  ceFAZolin   IVPB 1000 milliGRAM(s) IV Intermittent every 8 hours  enoxaparin Injectable 40 milliGRAM(s) SubCutaneous every 24 hours  pantoprazole    Tablet 40 milliGRAM(s) Oral before breakfast  polyethylene glycol 3350 17 Gram(s) Oral daily  senna 2 Tablet(s) Oral at bedtime  sodium chloride 0.9%. 1000 milliLiter(s) (70 mL/Hr) IV Continuous <Continuous>    MEDICATIONS  (PRN):  oxyCODONE    IR 2.5 milliGRAM(s) Oral every 4 hours PRN Severe Pain (7 - 10)      __________________________________________________  REVIEW OF SYSTEMS:    CONSTITUTIONAL: No fever,   EYES: no acute visual disturbances  NECK: No pain or stiffness  RESPIRATORY: No cough; No shortness of breath  CARDIOVASCULAR: No chest pain, no palpitations  GASTROINTESTINAL: No pain. No nausea or vomiting; No diarrhea   NEUROLOGICAL: No headache or numbness, no tremors  MUSCULOSKELETAL: No joint pain, no muscle pain  GENITOURINARY: no dysuria, no frequency, no hesitancy  PSYCHIATRY: no depression , no anxiety  ALL OTHER  ROS negative        Vital Signs Last 24 Hrs  T(C): 36.8 (2022 16:53), Max: 37.4 (2022 05:44)  T(F): 98.2 (2022 16:53), Max: 99.3 (2022 05:44)  HR: 85 (2022 16:53) (68 - 100)  BP: 125/73 (2022 16:53) (101/77 - 146/70)  BP(mean): 85 (2022 16:53) (80 - 98)  RR: 16 (2022 16:53) (13 - 18)  SpO2: 97% (2022 16:53) (94% - 100%)    ________________________________________________  PHYSICAL EXAM:  GENERAL: Alert, cooperative, cachectic woman, supine in bed  Kyphosis  HEENT: Normocephalic;  conjunctivae and sclerae clear; moist mucous membranes;   NECK : supple  CHEST/LUNG: Clear to auscultation bilaterally with good air entry   HEART: S1 S2 irregular; no murmurs, gallops or rubs  ABDOMEN: Soft, Nontender, Nondistended; Bowel sounds present  EXTREMITIES: no cyanosis; no edema; no calf tenderness  SKIN: warm and dry; no rash  NERVOUS SYSTEM:  Awake and alert; Oriented  to place, person and time ; no new deficits    _________________________________________________  LABS:                        11.9   24.60 )-----------( 302      ( 2022 12:35 )             36.1     02-06    141  |  112<H>  |  33<H>  ----------------------------<  117<H>  4.5   |  23  |  1.03    Ca    7.8<L>      2022 12:35    TPro  4.5<L>  /  Alb  x   /  TBili  x   /  DBili  x   /  AST  x   /  ALT  x   /  AlkPhos  x   02-06    PT/INR - ( 2022 07:30 )   PT: 12.1 sec;   INR: 1.02 ratio         PTT - ( 2022 07:30 )  PTT:31.0 sec  Urinalysis Basic - ( 2022 14:23 )    Color: Red / Appearance: very cloudy / S.015 / pH: x  Gluc: x / Ketone: Trace  / Bili: Negative / Urobili: Negative   Blood: x / Protein: 100 / Nitrite: Negative   Leuk Esterase: Moderate / RBC: 10-25 /HPF / WBC 6-10 /HPF   Sq Epi: x / Non Sq Epi: Negative /HPF / Bacteria: Negative /HPF      CAPILLARY BLOOD GLUCOSE

## 2022-02-06 NOTE — PROGRESS NOTE ADULT - ATTENDING COMMENTS
Patient was seen post-operatively. ; She was feeling well, asking for food.  Vital Signs Last 24 Hrs  T(C): 36.8 (02-06-22 @ 16:53), Max: 37.4 (02-06-22 @ 05:44)  T(F): 98.2 (02-06-22 @ 16:53), Max: 99.3 (02-06-22 @ 05:44)  HR: 85 (02-06-22 @ 16:53) (68 - 100)  BP: 125/73 (02-06-22 @ 16:53) (101/77 - 146/70)  BP(mean): 85 (02-06-22 @ 16:53) (80 - 98)  RR: 16 (02-06-22 @ 16:53) (13 - 18)  SpO2: 97% (02-06-22 @ 16:53) (94% - 100%)  PE, labs, as above.       < from: MR Femur No Cont, Left (02.03.22 @ 18:27) >      1. Left intertrochanteric fracture without underlying bony lesion at the   site of fracture. Varus angulation is present. Extensive soft tissue   changes including extensive muscle edema as detailed above, and 7 cm   layering hematoma in the region of the greater trochanteric bursa.    2. Sacral insufficiency fracture, a finding superimposed on chronic   healed deformity.    3. No convincing evidence of bony metastatic disease.    4. Large 11.7 cm pelvic mass previously described on abdominopelvic CT   highly concerning for neoplasm. This is intimately associated with the   bladder but could arise from other pelvic anatomy.    < end of copied text >    n< from: NM Bone Imaging Total (02.04.22 @ 11:13) >    FINDINGS: There is kyphoscoliosis, with heterogeneous radionuclide   distribution in the spine, probably due to degenerative disease. There is   physiologic distribution of radiotracer in the remainder of the   visualized osseous structures. No abnormal increased radionuclide   accumulation is seen in the left hip region. There is asymmetrical mild,   diffuse soft tissue activity/edema of the visualized left upper   extremity, the left elbow is not included in the field-of-view.    Both kidneys are visualized. Urinary bladder activity is confined to the   left side. Activity is present within the urinary drainage tubing.    IMPRESSION: Bone scan:    Kyphoscoliosis with degenerative changes in the spine.    No evidenceto suggest the presence of osseous metastasis.    No abnormal increased uptake in the left hip region.    < end of copied text >  Heme/Onc consultation, seen and appreciated.   IMP:  Left intertrochanteric hip fracture after a fall; s/p repair          left olecranon fracture, to be repaired on Tuesday.           Pelvic mass, associated with the bladder.  No evidence of elvin metastases.           Leukocytosis, likely reactive.  Anemia, Stable.   Plan:  surgical repair of olecranon.           Cysto-retrograde as outpatient after SETH.  Dr. Ferrari has discussed this with her.

## 2022-02-06 NOTE — PROGRESS NOTE ADULT - SUBJECTIVE AND OBJECTIVE BOX
MARCOS GUEVARA  MRN-[id number]    ORTHOPEDICS    Diagnosis:    - Left Hip Intertrochanteric Fracture  - Left Olecranon fx (closed)  - large pelvis mass on CT pelvis w/ IVC    24hr interval:  - large pelvic mass, suspicious for malignant lesion       Pt seen and eval today. Pain controlled of left hip.  Left hip pain rated 3/10, non-radiating, controlled with pain meds  Patient had MRI and Bone scan showing no bony lesions   Pt denies Chest pain, SOB, dyspnea, paresthesias, N/V/D, abdominal pain, syncope, or pain anywhere else.     ICU Vital Signs Last 24 Hrs  T(C): 37.4 (06 Feb 2022 05:44), Max: 37.4 (06 Feb 2022 05:44)  T(F): 99.3 (06 Feb 2022 05:44), Max: 99.3 (06 Feb 2022 05:44)  HR: 73 (06 Feb 2022 05:44) (73 - 100)  BP: 129/72 (06 Feb 2022 05:44) (122/77 - 129/72)  BP(mean): 89 (05 Feb 2022 21:55) (89 - 89)  ABP: --  ABP(mean): --  RR: 18 (06 Feb 2022 05:44) (17 - 18)  SpO2: 96% (06 Feb 2022 05:44) (96% - 97%)    Physical Exam:  General: AAOx3, NAD, resting comfortably in bed.    Left elbow: +Posterior splint intact. Skin is spink, warm and tender over the fracture site. Skin is intact with bone tenting. no open lesions. Decreased ROM left elbow 2* to pain. +tricipital weakness noted against resistance  compartments soft. +ecchymoses noted. good cap refill 5/5  strength. FROM of the wrist and fingers. NT over the left shoulder and c-spine. good cap refill. Rad/uln/median nerves intact.     Left Hip:   Skin is pink and warm. Tender at the fracture site. Decreased ROM of the affected hip due to pain. SILT.  Positive logroll test.  Lower extremity:  No calf tenderness, calves are soft. 2+pulses. NVI. 5/5 Strength of EHL/TA/gastrocnemius B/L.  Good capillary refill. Chronic skin changes noted to left tibial region    Labs:                         10.9   21.08 )-----------( 224      ( 05 Feb 2022 07:30 )             31.2     02-05    141  |  111<H>  |  24<H>  ----------------------------<  100<H>  3.9   |  24  |  0.60    Ca    8.0<L>      05 Feb 2022 07:30  Phos  2.4     02-04  Mg     2.5     02-04    TPro  4.9<L>  /  Alb  1.9<L>  /  TBili  0.5  /  DBili  x   /  AST  36  /  ALT  36  /  AlkPhos  60  02-05    RADIOLOGY:           INTERPRETATION:  Large pelvic malignant appearing mass uncertain primary   - bladder v gyn v bowel.  heterogenous ossesous appearance concerning for mets although ostepenia   may have this appearance  chronic appearing multilevel thoracic and lumbar compression frxs    see separate pelvis ct dication for left femur findings          MARTHA LINK MD; Attending Radiologist    < end of copied text >      ACC: [id number] EXAM:  XR HIP WITH PELV 2-3V LT                          PROCEDURE DATE:  02/02/2022          INTERPRETATION:  Pelvis and left hip    HISTORY: Patient fell    Rotated view of the pelvis shows no evidence of fracture nor destructive   change of the intrinsic bones of the pelvis.    Three views of the left hip show angulated fracture of the femoral neck   with shortening of the femur.    IMPRESSION: Femoral neck fracture.    < from: MR Pelvis Bony Only No Cont (02.03.22 @ 18:28) >  ACC: [id number] EXAM:  MR PELVIS BONY ONLY                        ACC: [id number] EXAM:  MR FEMUR LT                          PROCEDURE DATE:  02/03/2022          INTERPRETATION:  EXAMINATION: MR FEMUR LEFT, MR PELVIS BONY    CLINICAL INDICATION:Left femur fracture. Evaluate for underlying lesion.    COMPARISON: CT abdomen pelvis 2/3/2022, CT pelvis and hip radiographs   dated 2/2/2022    TECHNIQUE: MRI of the left femur and bony pelvis was performed without   intravenous contrast.    INTERPRETATION:    Bone and bone marrow: There is prominence of the bone marrow in the   pelvis and lower lumbar spine.    There is a chronic, healed deformity of the sacrum. There is patchy bone   marrow edema within the sacrum at the level of S2 which likely represents   an insufficiency fracture. There is an angulated intertrochanteric   fracture of the left proximal femur with surrounding marrow edema,   without an underlying marrow replacing lesion or findings to suggest   pathological fracture. Partiallyimaged lower lumbar spine demonstrates   healed compression fractures as seen on prior CT.    Soft tissues: There are post traumatic changes in the soft tissues   surrounding the left hip region with edema within the iliopsoas muscle,   with tail musculature and adductor compartment as well as the hamstring.   There is a layering hematoma within the greater trochanteric bursa. This   measures approximately 3.2 x 6.6 x 7 cm. There is extensive muscle strain   in the proximal left thigh.    Other: There is a Verduzco catheter in the bladder. There is a large T2   intermediate mass in the pelvis intimately associated with the plantar   which has been previously described on CT measuring at least 11.7 x 9.9 x   8.8 cm highly concerning for malignancy.    IMPRESSION:    1. Left intertrochanteric fracture without underlying bony lesion at the   site of fracture. Varus angulation is present. Extensive soft tissue   changes including extensive muscle edema as detailed above, and 7 cm   layering hematoma in the region of the greater trochanteric bursa.    2. Sacral insufficiency fracture, a finding superimposed on chronic   healed deformity.    3. No convincing evidence of bony metastatic disease.    4. Large 11.7 cm pelvic mass previously described on abdominopelvic CT   highly concerning for neoplasm. This is intimately associated with the   bladder but could arise from other pelvic anatomy.      < from: NM Bone Imaging Total (02.04.22 @ 11:13) >    ACC: [id number] EXAM:  NM BONE IMG WHOLE BODY                          PROCEDURE DATE:  02/04/2022          INTERPRETATION:  CLINICAL INFORMATION: 89-year-old female neoplastic   pelvic mass, status post fall with left hip and left elbow fractures.   Evaluate for bony metastasis.    RADIOPHARMACEUTICAL: 20.1 mCi Tc-99m HDP, I.V.    TECHNIQUE:  Whole-body images were obtained in the anterior and posterior   projections approximately 2-3 hours following administration of   radiotracer.    COMPARISON: No prior bone scan.    FINDINGS: There is kyphoscoliosis, with heterogeneous radionuclide   distribution in the spine, probably due to degenerative disease. There is   physiologic distribution of radiotracer in the remainder of the   visualized osseous structures. No abnormal increased radionuclide   accumulation is seen in the left hip region. There is asymmetrical mild,   diffuse soft tissue activity/edema of the visualized left upper   extremity, the left elbow is not included in the field-of-view.    Both kidneys are visualized. Urinary bladder activity is confined to the   left side. Activity is present within the urinary drainage tubing.    IMPRESSION: Bone scan:    Kyphoscoliosis with degenerative changes in the spine.    No evidenceto suggest the presence of osseous metastasis.    No abnormal increased uptake in the left hip region.      Impression:  89yFemale  - Left Hip Intertrochanteric Fracture  - Left olecranon fracture    Plan:  #LEFT HIP FRACTURE  - Keep NPO today  - Hold all anticoagulation today  - R/o Pathologic fracture, R/o metastatic lesions left femur     > MRI performed      > Bone scan to assess for metastatic lesions (of the whole body) to rule out metastatic lesions - negative   -  heme/onc consult appreciated   -  Pain management  -  Dvt prophylaxis with Venodynes     > Verduzco cath to monitor i/o's - Plan as per UROLOGY  -  Procedure planned:         > Left hip IM nailing tomorrow AND ORIF Left olecranon on 2/8/22       > blood cultures negative   -  Medical Optimization  -  Consent: verbal consent  -  case dw Dr Majano  - Fracture care, bedrest. NWB of the affected extremity.    #UTI  - Patient completed 3 day course of rocephin   - D/w Dr. Tomlinson concern of leukocytosis likely 2/2 to pelvic mass and known recent UTI     > Repeat UA negative for UTI

## 2022-02-06 NOTE — PROGRESS NOTE ADULT - SUBJECTIVE AND OBJECTIVE BOX
Patient seen/examined. Feels well after OR procedure today. No complaints.    Vital Signs Last 24 Hrs  T(C): 36.8 (06 Feb 2022 16:53), Max: 37.4 (06 Feb 2022 05:44)  T(F): 98.2 (06 Feb 2022 16:53), Max: 99.3 (06 Feb 2022 05:44)  HR: 85 (06 Feb 2022 16:53) (68 - 100)  BP: 125/73 (06 Feb 2022 16:53) (101/77 - 146/70)  BP(mean): 85 (06 Feb 2022 16:53) (80 - 98)  RR: 16 (06 Feb 2022 16:53) (13 - 18)  SpO2: 97% (06 Feb 2022 16:53) (94% - 100%)    General: NAD. AAOx3  Abd: soft. NT/ND                          11.9   24.60 )-----------( 302      ( 06 Feb 2022 12:35 )             36.1     02-06    141  |  112<H>  |  33<H>  ----------------------------<  117<H>  4.5   |  23  |  1.03    Ca    7.8<L>      06 Feb 2022 12:35    TPro  4.5<L>  /  Alb  x   /  TBili  x   /  DBili  x   /  AST  x   /  ALT  x   /  AlkPhos  x   02-06

## 2022-02-07 NOTE — PROGRESS NOTE ADULT - SUBJECTIVE AND OBJECTIVE BOX
Source of information: , Chart review, patient  Patient language: English  : n/a    CC: Patient is a 89y old  Female who presents with a chief complaint of Left elbow and hip fracture (08 Feb 2022 12:16)      HPI:  Pt is a 89 yo F lives alone at home, independent, ambulates sometimes with a rolator, baseline AAOx2-3 with PMH of Urinary incontinence, HTN and PSH of left mastectomy (left breast CA 30 yrs ago) presents to the ED after she had a fall this morning at home. Pt was going to the bathroom around 3am and on the way tripped between her legs and fell down. She couldn't stand up after the incident and called for help. She did not hit her head on the floor or lose consciousness. Her neighbor heard her around 7am and helped her to get up and called EMS to come to the hospital. No h/o fever, headache, dizziness, abdominal pain, N/V. Pt was prescribed yesterday Solifenacin for urinary incontinence. No recent travel/illness.  (02 Feb 2022 18:03)    Pt s/p left hip im nail pod#1.  Pt lying in place, nad.  + left hip pain.  Pain on exertion.  + left olecranon fracture.  OR in am.  Jalloh in place.    PAIN SCORE:  4/10     SCALE USED: (1-10 VNRS)    PAST MEDICAL & SURGICAL HISTORY:  Urinary frequency    Hypertension        FAMILY HISTORY:        SOCIAL HISTORY:  [ x] Denies Smoking, Alcohol, or Drug Use    Allergies    No Known Allergies    Intolerances        MEDICATIONS:    MEDICATIONS  (STANDING):  acetaminophen     Tablet .. 1000 milliGRAM(s) Oral every 8 hours  pantoprazole    Tablet 40 milliGRAM(s) Oral before breakfast  polyethylene glycol 3350 17 Gram(s) Oral daily  senna 2 Tablet(s) Oral at bedtime  sodium chloride 0.9%. 1000 milliLiter(s) (70 mL/Hr) IV Continuous <Continuous>    MEDICATIONS  (PRN):  ondansetron Injectable 4 milliGRAM(s) IV Push every 4 hours PRN Nausea and/or Vomiting  oxyCODONE    IR 2.5 milliGRAM(s) Oral every 4 hours PRN Severe Pain (7 - 10)      Vital Signs Last 24 Hrs  T(C): 36.8 (08 Feb 2022 05:38), Max: 36.8 (07 Feb 2022 14:56)  T(F): 98.2 (08 Feb 2022 05:38), Max: 98.3 (07 Feb 2022 14:56)  HR: 98 (08 Feb 2022 05:38) (81 - 98)  BP: 169/71 (08 Feb 2022 05:38) (131/74 - 169/71)  BP(mean): --  RR: 18 (08 Feb 2022 05:38) (18 - 18)  SpO2: 96% (08 Feb 2022 05:38) (95% - 98%)    LABS:                          10.6   38.21 )-----------( 353      ( 08 Feb 2022 07:31 )             32.2     02-08    140  |  111<H>  |  42<H>  ----------------------------<  130<H>  5.0   |  22  |  1.61<H>    Ca    8.4      08 Feb 2022 07:31    TPro  4.8<L>  /  Alb  1.9<L>  /  TBili  0.8  /  DBili  x   /  AST  43<H>  /  ALT  30  /  AlkPhos  65  02-07    PT/INR - ( 08 Feb 2022 07:31 )   PT: 12.5 sec;   INR: 1.05 ratio         PTT - ( 08 Feb 2022 07:31 )  PTT:31.0 sec  LIVER FUNCTIONS - ( 07 Feb 2022 08:51 )  Alb: 1.9 g/dL / Pro: 4.8 g/dL / ALK PHOS: 65 U/L / ALT: 30 U/L DA / AST: 43 U/L / GGT: x             CAPILLARY BLOOD GLUCOSE          REVIEW OF SYSTEMS:  CONSTITUTIONAL: No fever or fatigue  RESPIRATORY: No cough, wheezing, chills or hemoptysis; No shortness of breath  CARDIOVASCULAR: No chest pain, palpitations, dizziness, or leg swelling  GASTROINTESTINAL: No abdominal or epigastric pain. No nausea, vomiting; No diarrhea or constipation.   GENITOURINARY: +jalloh  MUSCULOSKELETAL: + left hip pain  NEURO: No weakness, no numbness   PSYCHIATRIC: No depression, anxiety, mood swings, or difficulty sleeping    PHYSICAL EXAM:  GENERAL:  Alert & Oriented X3, NAD, Good concentration  CHEST/LUNG: Clear to auscultation bilaterally; No rales, rhonchi, wheezing, or rubs  HEART: Regular rate and rhythm; No murmurs, rubs, or gallops  ABDOMEN: Soft, Nontender, Nondistended; Bowel sounds present  : + jalloh  EXTREMITIES:  2+ Peripheral Pulses, No cyanosis, or edema  MUSCULOSKELETAL: + left hip tenderness + decreased rom   NEUROLOGICAL: awake and alert and oriented   SKIN: No rashes or lesions    Radiology:    Drug Screen:          ORT Score   Family Hx of substance abuse	Female	Male  Alcohol 	                                              1              3  Illegal drugs	                                      2              3  Rx drugs                                               4	      4    Personal Hx of substance abuse		  Alcohol 	                                               3	      3  Illegal drugs                                  	       4	      4  Rx drugs                                                5	      5  Age between 16- 45 years	               1             1  hx preadolescent sexual abuse	       3	      0  Psychological disease		  ADD, OCD, bipolar, schizophrenia	2	      2  Depression                                    	1	      1  Score totals 	0	  		  a score of 3 or lower indicates low risk for opioid abuse		  a score of 4-7 indicates moderate risk for opioid abuse		  a score of 8 or higher indicates high risk for opioid abuse	    Risk factors associated with adverse outcomes related to opioid treatment  [ ]  Concurrent benzodiazepine use  [ ]  History/ Active substance use or alcohol use disorder  [ ] Psychiatric co-morbidity  [ ] Sleep apnea  [ ] COPD  [ ] BMI> 35  [ ] Liver dysfunction  [ ] Renal dysfunction  [ ] CHF  [ ] Smoker  [ x] Age > 60 years      [ x]  [place]  Reviewed and Copied to Chart. See below.

## 2022-02-07 NOTE — PROGRESS NOTE ADULT - ATTENDING COMMENTS
pt seen and examined. post surgery. s/p surgical correction of left LE fracture and scheduled arm surgery. bladder mass workup has been refused by  patient which is a reasonable thing to do at her age and frail condition. will f/u as needed

## 2022-02-07 NOTE — PROGRESS NOTE ADULT - PROBLEM SELECTOR PLAN 3
-Hematuria on admission   -CT abd and pelvis showed large pelvic mass suspicious for malignancy  -bone scan without any evidence of osseous metastasis  -pt refusing cystoscopy   -Heme/onc following  -Urology following

## 2022-02-07 NOTE — PROGRESS NOTE ADULT - ATTENDING COMMENTS
Patient was seen at bedside this AM. She was feeling well, eating well.   PE, labs, as above.     < from: MR Femur No Cont, Left (02.03.22 @ 18:27) >      1. Left intertrochanteric fracture without underlying bony lesion at the   site of fracture. Varus angulation is present. Extensive soft tissue   changes including extensive muscle edema as detailed above, and 7 cm   layering hematoma in the region of the greater trochanteric bursa.    2. Sacral insufficiency fracture, a finding superimposed on chronic   healed deformity.    3. No convincing evidence of bony metastatic disease.    4. Large 11.7 cm pelvic mass previously described on abdominopelvic CT   highly concerning for neoplasm. This is intimately associated with the   bladder but could arise from other pelvic anatomy.    < end of copied text >    n< from: [facility] Bone Imaging Total (02.04.22 @ 11:13) >    FINDINGS: There is kyphoscoliosis, with heterogeneous radionuclide   distribution in the spine, probably due to degenerative disease. There is   physiologic distribution of radiotracer in the remainder of the   visualized osseous structures. No abnormal increased radionuclide   accumulation is seen in the left hip region. There is asymmetrical mild,   diffuse soft tissue activity/edema of the visualized left upper   extremity, the left elbow is not included in the field-of-view.    Both kidneys are visualized. Urinary bladder activity is confined to the   left side. Activity is present within the urinary drainage tubing.    IMPRESSION: Bone scan:    Kyphoscoliosis with degenerative changes in the spine.    No evidenceto suggest the presence of osseous metastasis.    No abnormal increased uptake in the left hip region.    < end of copied text >  Heme/Onc consultation, seen and appreciated.   IMP:  Left intertrochanteric hip fracture after a fall; s/p repair          left olecranon fracture, to be repaired on Tuesday.           Pelvic mass, associated with the bladder.  No evidence of elvin metastases.           Leukocytosis, likely reactive.  Anemia, Stable.   Plan:  surgical repair of olecranon.           Cysto-retrograde as outpatient after SETH.  Dr. Ferrari has discussed this with her.

## 2022-02-07 NOTE — PROGRESS NOTE ADULT - SUBJECTIVE AND OBJECTIVE BOX
Patient is a 89y old  Female who presents with a chief complaint of Left elbow and hip fracture (07 Feb 2022 11:11)    OVERNIGHT EVENTS: no acute events overnight, c/o left arm pain and eager to have surgery on left arm     REVIEW OF SYSTEMS:  CONSTITUTIONAL: No fever, chills  NECK: No pain or stiffness  RESPIRATORY: No cough, SOB  CARDIOVASCULAR: No chest pain, palpitations  GASTROINTESTINAL: No abdominal pain. No nausea, vomiting, or diarrhea  NEUROLOGICAL: No HA  MUSCULOSKELETAL: +ve left arm pain and intermittent left hip pain with movement       T(C): 36.8 (02-07-22 @ 14:56), Max: 37.2 (02-06-22 @ 23:53)  HR: 92 (02-07-22 @ 14:56) (85 - 97)  BP: 131/74 (02-07-22 @ 14:56) (105/89 - 145/83)  RR: 18 (02-07-22 @ 14:56) (16 - 18)  SpO2: 98% (02-07-22 @ 14:56) (95% - 98%)  Wt(kg): --Vital Signs Last 24 Hrs  T(C): 36.8 (07 Feb 2022 14:56), Max: 37.2 (06 Feb 2022 23:53)  T(F): 98.3 (07 Feb 2022 14:56), Max: 99 (06 Feb 2022 23:53)  HR: 92 (07 Feb 2022 14:56) (85 - 97)  BP: 131/74 (07 Feb 2022 14:56) (105/89 - 145/83)  BP(mean): 93 (06 Feb 2022 21:37) (85 - 93)  RR: 18 (07 Feb 2022 14:56) (16 - 18)  SpO2: 98% (07 Feb 2022 14:56) (95% - 98%)    MEDICATIONS  (STANDING):  acetaminophen     Tablet .. 1000 milliGRAM(s) Oral every 8 hours  enoxaparin Injectable 40 milliGRAM(s) SubCutaneous every 24 hours  pantoprazole    Tablet 40 milliGRAM(s) Oral before breakfast  polyethylene glycol 3350 17 Gram(s) Oral daily  senna 2 Tablet(s) Oral at bedtime  sodium chloride 0.9%. 1000 milliLiter(s) (70 mL/Hr) IV Continuous <Continuous>    MEDICATIONS  (PRN):  oxyCODONE    IR 2.5 milliGRAM(s) Oral every 4 hours PRN Severe Pain (7 - 10)      PHYSICAL EXAM:  GENERAL: NAD  EYES: clear conjunctiva  ENMT: Moist mucous membranes  NECK: Supple, No JVD  CHEST/LUNG: Clear to auscultation bilaterally; No rales, rhonchi, wheezing, or rubs  HEART: S1, S2, Regular rate and rhythm  ABDOMEN: Soft, Nontender, Nondistended; Bowel sounds present  NEURO: Alert & Oriented to person place and situation   EXTREMITIES: left hip with occlusive dsg, left arm with LUE with split in place   : jalloh with rpisca urine     Consultant(s) Notes Reviewed:  [x ] YES  [ ] NO  Care Discussed with Consultants/Other Providers [ x] YES  [ ] NO    LABS:                        11.9   24.60 )-----------( 302      ( 06 Feb 2022 12:35 )             36.1     02-07    139  |  112<H>  |  32<H>  ----------------------------<  81  5.1   |  21<L>  |  1.06    Ca    7.9<L>      07 Feb 2022 08:51    TPro  4.8<L>  /  Alb  1.9<L>  /  TBili  0.8  /  DBili  x   /  AST  43<H>  /  ALT  30  /  AlkPhos  65  02-07  CAPILLARY BLOOD GLUCOSE  RADIOLOGY & ADDITIONAL TESTS:    < from: CT Abdomen and Pelvis w/wo IV Cont (02.04.22 @ 15:00) >    ACC: [id number] EXAM:  CT ABDOMEN AND PELVIS WAW IC                          PROCEDURE DATE:  02/04/2022          INTERPRETATION:  CLINICAL INFORMATION: 89 years  Female with pelvic mass      Pelvic mass.    ADDITIONAL CLINICAL INFORMATION: Disorder of the urinary system N39.9    COMPARISON: 2/3/2022    CONTRAST/COMPLICATIONS:  IV Contrast: Omnipaque 350  90 cc administered   10 cc discarded  Oral Contrast: NONE  Complications: None reported at time of study completion    PROCEDURE:  CT of the Abdomen and Pelvis was performed.  Precontrast, Nephrographic and Excretory phases were acquired (CT   UROGRAM).    Sagittal and coronal reformats were performed.    FINDINGS:  LOWER CHEST: Bilateral lower lobe dependent atelectasis.    LIVER: Within normal limits.  BILE DUCTS: Normal caliber.  GALLBLADDER: Vicarious excretion of contrast in the gallbladder  SPLEEN: 5.6 x 4.0 cm rim calcified cyst.  PANCREAS: Within normal limits.  ADRENALS: Within normal limits.  KIDNEYS/URETERS: 2.7 cm left midpole cyst. 1.7 cm right lower pole cyst.   Bilateral hypodensities too small to characterize. No   hydroureteronephrosis. Symmetrical nephrograms.    BLADDER: Collapsed around Jalloh catheter. 10.7 x 9.7 x 10.0 cm complex   cystic pelvic mass with areas of internal soft tissue. Intraluminal   contrast extends around the medial margin of the mass suggesting bladder   etiology.  REPRODUCTIVE ORGANS: Uterus not visualized.    BOWEL: No bowel obstruction. Appendix is not visualized and cannot be   assessed. Sigmoid diverticulosis without diverticulitis. Retained fecal   matter throughout the colon.  PERITONEUM: Small pelvic ascites.  VESSELS: Atherosclerotic changes.  RETROPERITONEUM/LYMPH NODES: No lymphadenopathy.  ABDOMINAL WALL: 4.0 x 1.6 cm rightabdominal wall subcutaneous cyst.   Increasing subcutaneous edema overlying the left hip.  BONES: Comminuted angulated left intertrochanteric femoral neck fracture.   Avulsion of the lesser trochanter. Chronic healed left superior and   inferior pubic rami fracture. Chronic right sacral deformity. Severe T12   compression fracture. Moderate L2 compression fracture. Osteoporosis.    IMPRESSION:  Reidentified complex cystic pelvic mass effaces the bladder lumen.   Suspect bladder etiology. However,ovarian or uterine etiology cannot be   entirely excluded.    Comminuted angulated displaced left hip fracture with increasing   overlying subcutaneous edema.      < end of copied text >  < from: Xray Elbow AP + Lateral + Oblique, Left (02.02.22 @ 12:10) >  ACC: [id number] EXAM:  XR ELBOW COMP MIN 3V LT                          PROCEDURE DATE:  02/02/2022          INTERPRETATION:  Left elbow    HISTORY: Patient fell     Three views of the left elbow show comminuted fracture of the olecranon   process with dislocation of the proximal olecranon along the distal   humerus. The radial head is in normal apposition with the distal humerus.    IMPRESSION: Olecranon fracture.      Thank you for this referral.    < end of copied text >      Imaging Personally Reviewed:  [ ] YES  [ ] NO

## 2022-02-07 NOTE — PROGRESS NOTE ADULT - ASSESSMENT
Home    My Content    Search   Help   Log out  Prescription Monitoring Program Registry  Welcome Nicky Lamb×   Help  ×An MME Calculator is now available by clicking the MME tab on the purple task bar.  An informational sheet detailing this enhancement may be found by clicking ‘MME Calculator Help’ on the MME Calculator page.   This informational tool is a resource and is not meant for direct clinical application.   Patient Search   Multi-Patient Search   MME Calculator   Reports   Drug List   Designation   My MIGUELITO #  Data Detail Level: Printer-Friendly View Extended View  Confidential Drug Utilization Report  Search Terms: gareth yuriy, 10/08/1929Search Date: 02/04/2022 11:00:22 AM  The Drug Utilization Report below displays all of the controlled substance prescriptions, if any, that your patient has filled in the last twelve months. The information displayed on this report is compiled from pharmacy submissions to the Department, and accurately reflects the information as submitted by the pharmacies.    This report was requested by: Nicky Lamb | Reference #: [id number]    There are no results for the search terms that you entered.

## 2022-02-07 NOTE — PROGRESS NOTE ADULT - PROBLEM SELECTOR PLAN 1
-s/p fall with  left hip fracture  -s/p L hip IM nailing on 2/6  -WBAT LLE  -cont pain mgmt   -PT recommended SETH   -Ortho following

## 2022-02-07 NOTE — PROGRESS NOTE ADULT - PROBLEM SELECTOR PLAN 2
-left  olecranon fracture  -for OR tomorrow for ORIF of left olecranon fracture   -NPO after midnight   -NWB of LUE  -rest plan as above

## 2022-02-07 NOTE — PROGRESS NOTE ADULT - ASSESSMENT
89yo Female lives alone at home, independent, ambulates sometimes with a rolator, baseline AAOx2-3 with PMH of Urinary incontinence, HTN and PSH of left mastectomy (left breast CA 30 yrs ago.  Presented to the ED after fall.  Admitted for left  olecranon fracture & left hip fracture. Ortho consulted   Pt had jalloh placed in ED and was found to have gross hematuria, CT abd and pelvis showed large pelvic mass suspicious for malignancy, bone scan was done and did not show any osseous metastasis, Heme/onc on board and was recommended for cystoscopy but pt refused cysto at this time   Pt was given 1 PRBC for optimization prior to OR   S/p L hip IM nailing on 2/6, pending OR tomorrow for ORIF of left olecranon fracture

## 2022-02-07 NOTE — PROGRESS NOTE ADULT - SUBJECTIVE AND OBJECTIVE BOX
89yFemale    Diagnosis:  S/p L Hip IM Nailing POD#1 and Left Olecranon Fracture    Patient was seen and evaluated at bedside. Patient with no acute complaints.   Pain is well controlled.   Denies CP/SOB, dyspnea, paresthesias, N/V/D, palpitations.     Vital Signs Last 24 Hrs  T(C): 36.9 (07 Feb 2022 06:10), Max: 37.2 (06 Feb 2022 23:53)  T(F): 98.4 (07 Feb 2022 06:10), Max: 99 (06 Feb 2022 23:53)  HR: 88 (07 Feb 2022 06:10) (68 - 97)  BP: 145/83 (07 Feb 2022 06:10) (101/77 - 146/70)  BP(mean): 93 (06 Feb 2022 21:37) (80 - 98)  RR: 17 (07 Feb 2022 06:10) (13 - 18)  SpO2: 97% (07 Feb 2022 06:10) (94% - 100%)  I&O's Detail    06 Feb 2022 07:01  -  07 Feb 2022 07:00  --------------------------------------------------------  IN:    sodium chloride 0.9%: 75 mL  Total IN: 75 mL    OUT:    Indwelling Catheter - Urethral (mL): 850 mL  Total OUT: 850 mL    Total NET: -775 mL          Physical Exam:    General: AAOx3, NAD, resting comfortably in bed.    Left UE: Splint intact at left arm. NVI. Left fingers with swelling, position changed on LUE on pillow to decrease swelling. ROM of fingers. Sensation intact.   Left Hip:  Dressing is C/D/I. Skin is pink and warm. Staples intact. No erythema. SILT.  Wound with no drainage, healing well.   Lower extremity:  No calf tenderness, calves are soft. 2+pulses. NVI. 5/5 Strength of EHL/TA/gastrocnemius B/L.  Good capillary refill. Warm, well-perfused.                           11.9   24.60 )-----------( 302      ( 06 Feb 2022 12:35 )             36.1     02-06    141  |  112<H>  |  33<H>  ----------------------------<  117<H>  4.5   |  23  |  1.03    Ca    7.8<L>      06 Feb 2022 12:35    TPro  4.5<L>  /  Alb  x   /  TBili  x   /  DBili  x   /  AST  x   /  ALT  x   /  AlkPhos  x   02-06      Impression:  89yFemale  S/p L Hip IM Nailing POD#1 and Left Olecranon Fracture  Plan:  -  Pain management  -  Dvt prophylaxis with Lovenox  -  Daily Physical Theapy:  WBAT of left lower extremity and NWB of LUE  -  Discharge planning: Home Vs. Rehab  -  Continue with Post-op Antibiotics x 24hrs  -  For OR tomorrow (ORIF of Left Olecranon Fx)  -  Keep NPO after midnight

## 2022-02-07 NOTE — PROGRESS NOTE ADULT - SUBJECTIVE AND OBJECTIVE BOX
Patient is a 89y old  Female who presents with a chief complaint of Left elbow and hip fracture (2022 10:05)      SUBJECTIVE / OVERNIGHT EVENTS:      MEDICATIONS  (STANDING):  acetaminophen     Tablet .. 1000 milliGRAM(s) Oral every 8 hours  enoxaparin Injectable 40 milliGRAM(s) SubCutaneous every 24 hours  pantoprazole    Tablet 40 milliGRAM(s) Oral before breakfast  polyethylene glycol 3350 17 Gram(s) Oral daily  senna 2 Tablet(s) Oral at bedtime  sodium chloride 0.9%. 1000 milliLiter(s) (70 mL/Hr) IV Continuous <Continuous>    MEDICATIONS  (PRN):  oxyCODONE    IR 2.5 milliGRAM(s) Oral every 4 hours PRN Severe Pain (7 - 10)            Vital Signs Last 24 Hrs  T(C): 36.9 (2022 06:10), Max: 37.2 (2022 23:53)  T(F): 98.4 (2022 06:10), Max: 99 (2022 23:53)  HR: 95 (2022 11:02) (68 - 97)  BP: 142/70 (2022 11:02) (101/77 - 146/70)  BP(mean): 93 (2022 21:37) (80 - 98)  RR: 17 (2022 06:10) (13 - 18)  SpO2: 98% (2022 11:02) (94% - 100%)    LABS:                        11.9   24.60 )-----------( 302      ( 2022 12:35 )             36.1     02-07    139  |  112<H>  |  32<H>  ----------------------------<  81  5.1   |  21<L>  |  1.06    Ca    7.9<L>      2022 08:51    TPro  4.8<L>  /  Alb  1.9<L>  /  TBili  0.8  /  DBili  x   /  AST  43<H>  /  ALT  30  /  AlkPhos  65  02-07          Urinalysis Basic - ( 2022 14:23 )    Color: Red / Appearance: very cloudy / S.015 / pH: x  Gluc: x / Ketone: Trace  / Bili: Negative / Urobili: Negative   Blood: x / Protein: 100 / Nitrite: Negative   Leuk Esterase: Moderate / RBC: 10-25 /HPF / WBC 6-10 /HPF   Sq Epi: x / Non Sq Epi: Negative /HPF / Bacteria: Negative /HPF        COVID-19 PCR: Peter (2022 12:27)         Patient is a 89y old  Female who presents with a chief complaint of Left elbow and hip fracture (2022 10:05)    SUBJECTIVE / OVERNIGHT EVENTS: events noted. Pt refusing cystoscopy/Bx. Discussed with pt that she may be a candidate for immunotherapy, but we would need a biopsy or urine cytology at least for diagnosis. The pt states she does not want to have a biopsy as she would not want treatment for the likely cancer. Pt states she is urinating a lot, but denies pain      MEDICATIONS  (STANDING):  acetaminophen     Tablet .. 1000 milliGRAM(s) Oral every 8 hours  enoxaparin Injectable 40 milliGRAM(s) SubCutaneous every 24 hours  pantoprazole    Tablet 40 milliGRAM(s) Oral before breakfast  polyethylene glycol 3350 17 Gram(s) Oral daily  senna 2 Tablet(s) Oral at bedtime  sodium chloride 0.9%. 1000 milliLiter(s) (70 mL/Hr) IV Continuous <Continuous>    MEDICATIONS  (PRN):  oxyCODONE    IR 2.5 milliGRAM(s) Oral every 4 hours PRN Severe Pain (7 - 10)    Vital Signs Last 24 Hrs  T(C): 36.9 (2022 06:10), Max: 37.2 (2022 23:53)  T(F): 98.4 (2022 06:10), Max: 99 (2022 23:53)  HR: 95 (2022 11:02) (68 - 97)  BP: 142/70 (2022 11:02) (101/77 - 146/70)  BP(mean): 93 (2022 21:37) (80 - 98)  RR: 17 (2022 06:10) (13 - 18)  SpO2: 98% (2022 11:02) (94% - 100%)    GEN: NAD; A and O x 3, frail  LUNGS: CTA B/L  HEART: S1 S2  ABDOMEN: soft, non-tender, non-distended, + BS  EXTREMITIES: LUE splint with left hand/finger edema  NERVOUS SYSTEM:  Awake and alert; no focal neuro deficits    LABS:                        11.9   24.60 )-----------( 302      ( 2022 12:35 )             36.1     02-07    139  |  112<H>  |  32<H>  ----------------------------<  81  5.1   |  21<L>  |  1.06    Ca    7.9<L>      2022 08:51    TPro  4.8<L>  /  Alb  1.9<L>  /  TBili  0.8  /  DBili  x   /  AST  43<H>  /  ALT  30  /  AlkPhos  65            Urinalysis Basic - ( 2022 14:23 )    Color: Red / Appearance: very cloudy / S.015 / pH: x  Gluc: x / Ketone: Trace  / Bili: Negative / Urobili: Negative   Blood: x / Protein: 100 / Nitrite: Negative   Leuk Esterase: Moderate / RBC: 10-25 /HPF / WBC 6-10 /HPF   Sq Epi: x / Non Sq Epi: Negative /HPF / Bacteria: Negative /HPF        COVID-19 PCR: [facility] (2022 12:27)

## 2022-02-07 NOTE — PROGRESS NOTE ADULT - PROBLEM SELECTOR PLAN 1
[facility] PT with acute left hip pain which is somatic in nature due to s/p fall + left hip fracture.  MRI neg for bony mets. bone scan negative for osseous mets. s/p left hip im nail #1.  OR in am  High risk medications reviewed. Avoid polypharmacy. Avoid IV opioids. Avoid NSAIDs and benzodiazepines. Non-pharmacological sleep aides initiated. Non-opioid medications and non-pharmacological pain management measures initiated.  Routine Meds  - Acetaminophen 975mg po q 8 hours prn  Mild Pain ( Pain Level - 1-3)  - Non - Pharmacological Measures  Moderate Pain (Pain Level 4-6)  - Oxycodone 2.5mg po q 4 hours prn  Severe Pain ( Pain Level - 7-10)  - no iv opioids  Bowel Regimen   - Senna and Miralax

## 2022-02-07 NOTE — PROGRESS NOTE ADULT - SUBJECTIVE AND OBJECTIVE BOX
Subjective    Patient seen and examined. States she feels well, understands there is a mass in the pelvis and that it may be cancer. She states she wants to focus on the arm, does not was to "fuss over" the bladder because she doesn't want to have treatment at her age.     Objective    Vital signs  T(F): , Max: 99 (02-06-22 @ 23:53)  HR: 88 (02-07-22 @ 06:10)  BP: 145/83 (02-07-22 @ 06:10)  SpO2: 97% (02-07-22 @ 06:10)  Wt(kg): --    Output     02-06 @ 07:01  -  02-07 @ 07:00  --------------------------------------------------------  IN: 75 mL / OUT: 850 mL / NET: -775 mL        General: NAD, left arm casted   Abdomen: soft/non-tender/non-distended  : jalloh in place with yellow urine     Labs      02-07 @ 08:51    WBC --    / Hct --    / SCr 1.06     02-06 @ 12:35    WBC 24.60 / Hct 36.1  / SCr 1.03

## 2022-02-07 NOTE — PROGRESS NOTE ADULT - ASSESSMENT
89 yo female with gross hematuria presenting after fall with hip and left olecranon fracture. CT urogram demonstrate large pelvic mass of uncertain pathology, however suspicious for bladder primary.     -- labs reviewed  -- Again discussed concern for likely malignancy with patient  -- Discussed in detail process for cystoscopy and possible biopsy to which patient refused   -- continue antibiotics  -- continue jalloh  -- f/u oncologic workup   -- will follow   -- Discussed with PA staff 89 yo female with gross hematuria presenting after fall with hip and left olecranon fracture. CT urogram demonstrate large pelvic mass of uncertain pathology, however suspicious for bladder primary.     -- labs reviewed  -- Again discussed concern for likely malignancy with patient  -- Discussed in detail process for cystoscopy and possible biopsy to which patient refused   -- continue antibiotics  -- continue jalloh  -- f/u oncologic workup   -- will follow   -- Discussed with PA and house staff

## 2022-02-07 NOTE — PROGRESS NOTE ADULT - ASSESSMENT
complete note to follow     Assessment and Recommendation:   · Assessment	  89 y/o with remote h/o breast cancer s/p left mastectomy otherwise still independent and able to perform ADL admit for fall with multiple fractures and also was found to have a large mass in pelvic    #large pelvic mass   likely bladder mass   also possible other origin   on case   need tissue diagnosis / urine cytology  pt refusing further work-up for bladder mass and although discussed possible tx options of chemo vs immunotherapy the pt still refused Bx and would NOT want systemic treatment    #elevated WBC   likely due to stress and known UTI   on iv antibiotics   b/c/s negative so far     #anemia   normocytic   likely due to multifactorial   currently mild hematuria with her jalloh   HB trending up   iron studies wnl    #multiple fractures due to fall   ortho follwing s/p left hip repair and now scheduled for Left olecranon ORIF tmrw  no objection for surgery if medical team clear her for surgery     Thank you for the referral. Will continue to monitor the patient.  Please call with any questions [phone]  Above reviewed with Attending Dr. Zoran CLARKE/[facility]  [address] [facility], [address], [place], [place]  [phone]

## 2022-02-08 NOTE — PROGRESS NOTE ADULT - SUBJECTIVE AND OBJECTIVE BOX
Source of information: , Chart review  Patient language: English  : n/a    CC:  left hip pain    This is a 89yFemale patient who presents to the hospital for Patient is a 89y old  Female who presents with a chief complaint of Left elbow and hip fracture (08 Feb 2022 11:19)    Pt  S/p L Hip IM Nailing POD#2 and Left Olecranon Fracture.  + left hip pain.  Pt drowsy.  No nausea or vomiting + fatigue today.  Pt if for OR today for left olecranon fx.      PAIN SCORE:  4/10      SCALE USED: (1-10 NRS)      PAST MEDICAL & SURGICAL HISTORY:  Urinary frequency    Hypertension        FAMILY HISTORY:        SOCIAL HISTORY:  [ x] Denies Smoking, Alcohol, or Drug Use    Allergies    No Known Allergies    Intolerances        MEDICATIONS:    MEDICATIONS  (STANDING):  acetaminophen     Tablet .. 1000 milliGRAM(s) Oral every 8 hours  pantoprazole    Tablet 40 milliGRAM(s) Oral before breakfast  polyethylene glycol 3350 17 Gram(s) Oral daily  senna 2 Tablet(s) Oral at bedtime  sodium chloride 0.9%. 1000 milliLiter(s) (70 mL/Hr) IV Continuous <Continuous>    MEDICATIONS  (PRN):  ondansetron Injectable 4 milliGRAM(s) IV Push every 4 hours PRN Nausea and/or Vomiting  oxyCODONE    IR 2.5 milliGRAM(s) Oral every 4 hours PRN Severe Pain (7 - 10)      Vital Signs Last 24 Hrs  T(C): 36.8 (08 Feb 2022 05:38), Max: 36.8 (07 Feb 2022 14:56)  T(F): 98.2 (08 Feb 2022 05:38), Max: 98.3 (07 Feb 2022 14:56)  HR: 98 (08 Feb 2022 05:38) (81 - 98)  BP: 169/71 (08 Feb 2022 05:38) (131/74 - 169/71)  BP(mean): --  RR: 18 (08 Feb 2022 05:38) (18 - 18)  SpO2: 96% (08 Feb 2022 05:38) (95% - 98%)    LABS:                          10.6   38.21 )-----------( 353      ( 08 Feb 2022 07:31 )             32.2     02-08    140  |  111<H>  |  42<H>  ----------------------------<  130<H>  5.0   |  22  |  1.61<H>    Ca    8.4      08 Feb 2022 07:31    TPro  4.8<L>  /  Alb  1.9<L>  /  TBili  0.8  /  DBili  x   /  AST  43<H>  /  ALT  30  /  AlkPhos  65  02-07    PT/INR - ( 08 Feb 2022 07:31 )   PT: 12.5 sec;   INR: 1.05 ratio         PTT - ( 08 Feb 2022 07:31 )  PTT:31.0 sec  LIVER FUNCTIONS - ( 07 Feb 2022 08:51 )  Alb: 1.9 g/dL / Pro: 4.8 g/dL / ALK PHOS: 65 U/L / ALT: 30 U/L DA / AST: 43 U/L / GGT: x             CAPILLARY BLOOD GLUCOSE          Radiology:    Drug Screen:        REVIEW OF SYSTEMS:  CONSTITUTIONAL: No fever or fatigue  RESPIRATORY: No cough, wheezing, chills or hemoptysis; No shortness of breath  CARDIOVASCULAR: No chest pain, palpitations, dizziness, or leg swelling  GASTROINTESTINAL: No abdominal or epigastric pain. No nausea, vomiting; No diarrhea or constipation.   GENITOURINARY: + jalloh  MUSCULOSKELETAL: + left hip pain  + left elbow pain  NEURO: No headaches, No numbness/tingling b/l LE, No weakness  PSYCHIATRIC: No depression, anxiety, mood swings, or difficulty sleeping    PHYSICAL EXAM:  GENERAL:  Alert & Oriented X3, NAD, drowsy  CHEST/LUNG: Clear to auscultation bilaterally; No rales, rhonchi, wheezing, or rubs  HEART: Regular rate and rhythm; No murmurs, rubs, or gallops  ABDOMEN: Soft, Nontender, Nondistended; Bowel sounds present  EXTREMITIES:  2+ Peripheral Pulses, No cyanosis, or edema  MUSCULOSKELETAL: + decreased rom + left hip tenderness   SKIN: No rashes or lesions    Risk factors associated with adverse outcomes related to opioid treatment  [ ]  Concurrent benzodiazepine use  [ ]  History/ Active substance use or alcohol use disorder  [ ] Psychiatric co-morbidity  [ ] Sleep apnea  [ ] COPD  [ ] BMI> 35  [ ] Liver dysfunction  [ ] Renal dysfunction  [ ] CHF  [ ] Smoker  [x ]  Age > 60 years    [ x]  [place]  Reviewed and Copied to Chart. See below.    Plan of care and goal oriented pain management treatment options were discussed with patient and /or primary care giver; all questions and concerns were addressed and care was aligned with patient's wishes.    Educated patient on goal oriented pain management treatment options     02-08-22 @ 12:16

## 2022-02-08 NOTE — PROGRESS NOTE ADULT - PROBLEM SELECTOR PLAN 1
[facility] PT with acute left hip pain which is somatic in nature due to s/p fall + left hip fracture.  MRI neg for bony mets. bone scan negative for osseous mets. s/p left hip im nail #2.  OR today for olecranon fx  High risk medications reviewed. Avoid polypharmacy. Avoid IV opioids. Avoid NSAIDs and benzodiazepines. Non-pharmacological sleep aides initiated. Non-opioid medications and non-pharmacological pain management measures initiated.  Routine Meds  - Acetaminophen 975mg po q 8 hours prn  Mild Pain ( Pain Level - 1-3)  - Non - Pharmacological Measures  Moderate Pain (Pain Level 4-6)  - Oxycodone 2.5mg po q 4 hours prn  Severe Pain ( Pain Level - 7-10)  - no iv opioids  Bowel Regimen   - Senna and Miralax

## 2022-02-08 NOTE — PROGRESS NOTE ADULT - SUBJECTIVE AND OBJECTIVE BOX
Patient is a 89y old  Female who presents with a chief complaint of Left elbow and hip fracture (08 Feb 2022 12:16)      SUBJECTIVE / OVERNIGHT EVENTS:        MEDICATIONS  (STANDING):  acetaminophen     Tablet .. 1000 milliGRAM(s) Oral every 8 hours  pantoprazole    Tablet 40 milliGRAM(s) Oral before breakfast  polyethylene glycol 3350 17 Gram(s) Oral daily  senna 2 Tablet(s) Oral at bedtime  sodium chloride 0.9%. 1000 milliLiter(s) (70 mL/Hr) IV Continuous <Continuous>    MEDICATIONS  (PRN):  ondansetron Injectable 4 milliGRAM(s) IV Push every 4 hours PRN Nausea and/or Vomiting  oxyCODONE    IR 2.5 milliGRAM(s) Oral every 4 hours PRN Severe Pain (7 - 10)      Vital Signs Last 24 Hrs  T(C): 36.8 (08 Feb 2022 05:38), Max: 36.8 (07 Feb 2022 14:56)  T(F): 98.2 (08 Feb 2022 05:38), Max: 98.3 (07 Feb 2022 14:56)  HR: 98 (08 Feb 2022 05:38) (81 - 98)  BP: 169/71 (08 Feb 2022 05:38) (131/74 - 169/71)  BP(mean): --  RR: 18 (08 Feb 2022 05:38) (18 - 18)  SpO2: 96% (08 Feb 2022 05:38) (95% - 98%)    LABS:                        10.6   38.21 )-----------( 353      ( 08 Feb 2022 07:31 )             32.2     02-08    140  |  111<H>  |  42<H>  ----------------------------<  130<H>  5.0   |  22  |  1.61<H>    Ca    8.4      08 Feb 2022 07:31    TPro  4.8<L>  /  Alb  1.9<L>  /  TBili  0.8  /  DBili  x   /  AST  43<H>  /  ALT  30  /  AlkPhos  65  02-07    PT/INR - ( 08 Feb 2022 07:31 )   PT: 12.5 sec;   INR: 1.05 ratio         PTT - ( 08 Feb 2022 07:31 )  PTT:31.0 sec          COVID-19 PCR: NotDetec (07 Feb 2022 18:39)  COVID-19 PCR: NotDetec (02 Feb 2022 12:27)

## 2022-02-08 NOTE — PROGRESS NOTE ADULT - SUBJECTIVE AND OBJECTIVE BOX
Patient seen/examined. no events overnight. No N/V. Urine remains clear    Vital Signs Last 24 Hrs  T(C): 36.9 (08 Feb 2022 13:00), Max: 36.9 (08 Feb 2022 13:00)  T(F): 98.5 (08 Feb 2022 13:00), Max: 98.5 (08 Feb 2022 13:00)  HR: 82 (08 Feb 2022 13:00) (81 - 98)  BP: 136/73 (08 Feb 2022 13:00) (136/73 - 169/71)  BP(mean): --  RR: 18 (08 Feb 2022 13:00) (18 - 18)  SpO2: 95% (08 Feb 2022 13:00) (95% - 96%)    General: NAD. AAOx3  Abd: soft. NT/ND  : Verduzco with clear yellow urine                          10.6   38.21 )-----------( 353      ( 08 Feb 2022 07:31 )             32.2     02-08    140  |  111<H>  |  42<H>  ----------------------------<  130<H>  5.0   |  22  |  1.61<H>    Ca    8.4      08 Feb 2022 07:31    TPro  4.8<L>  /  Alb  1.9<L>  /  TBili  0.8  /  DBili  x   /  AST  43<H>  /  ALT  30  /  AlkPhos  65  02-07

## 2022-02-08 NOTE — PROGRESS NOTE ADULT - ATTENDING COMMENTS
89yo F PMHx of HTN, Breast CA s/p left mastectomy who presented to the ED after a fall. Found to have a left olecranon and left hip fractures. Verduzco placed with gross hematuria. Patient underwent CT A/P which showed large pelvic mass suspicious for malignancy. Bone scan negative for metastasis. Urology and hematology consulted for pelvic mass, but patient refuses further treatment. Patient underwent left hip IM nailing on 2/6. Patient went to the OR today for olecranon repair. WBC elevated to 38k today, unclear etiology. Possible Leukemoid reaction vs. due to underlying malignancy vs. ?infection. Will discuss with heme/onc but also dependent on patient desire for further work-up. UC contaminated. Cr elevated to 1.6, possible due to poor PO intake vs. contrast-induced. Continue on IVF. Hold Losartan.

## 2022-02-08 NOTE — PROGRESS NOTE ADULT - PROBLEM SELECTOR PLAN 2
-  left  olecranon fracture  -  for OR today for ORIF of left olecranon fracture   -  NPO after midnight   -  Meghana

## 2022-02-08 NOTE — PROGRESS NOTE ADULT - ASSESSMENT
Home    My Content    Search   Help   Log out  Prescription Monitoring Program Registry  Welcome Nicky Lamb×   Help  ×An MME Calculator is now available by clicking the MME tab on the purple task bar.  An informational sheet detailing this enhancement may be found by clicking ‘MME Calculator Help’ on the MME Calculator page.   This informational tool is a resource and is not meant for direct clinical application.   Patient Search   Multi-Patient Search   MME Calculator   Reports   Drug List   Designation   My MIGUELITO #  Data Detail Level: Printer-Friendly View Extended View  Confidential Drug Utilization Report  Search Terms: gareth yuriy, 10/08/1929Search Date: 02/04/2022 11:00:22 AM  The Drug Utilization Report below displays all of the controlled substance prescriptions, if any, that your patient has filled in the last twelve months. The information displayed on this report is compiled from pharmacy submissions to the Department, and accurately reflects the information as submitted by the pharmacies.    This report was requested by: Nicky Lamb | Reference #: [id number]    There are no results for the search terms that you entered.

## 2022-02-08 NOTE — PROGRESS NOTE ADULT - SUBJECTIVE AND OBJECTIVE BOX
Patient is a 89y old  Female who presents with a chief complaint of Left elbow and hip fracture (07 Feb 2022 11:11)    OVERNIGHT EVENTS: no acute events overnight, c/o left arm pain and eager to have surgery on left arm       MEDICATIONS  (STANDING):  acetaminophen     Tablet .. 1000 milliGRAM(s) Oral every 8 hours  pantoprazole    Tablet 40 milliGRAM(s) Oral before breakfast  polyethylene glycol 3350 17 Gram(s) Oral daily  senna 2 Tablet(s) Oral at bedtime  sodium chloride 0.9%. 1000 milliLiter(s) (70 mL/Hr) IV Continuous <Continuous>    MEDICATIONS  (PRN):  ondansetron Injectable 4 milliGRAM(s) IV Push every 4 hours PRN Nausea and/or Vomiting  oxyCODONE    IR 2.5 milliGRAM(s) Oral every 4 hours PRN Severe Pain (7 - 10)      REVIEW OF SYSTEMS:  CONSTITUTIONAL: No fever, chills.  Generalized malaise  NECK: No pain or stiffness  RESPIRATORY: No cough, SOB  CARDIOVASCULAR: No chest pain, palpitations  GASTROINTESTINAL: No abdominal pain. No nausea, vomiting, or diarrhea  NEUROLOGICAL: No headache, dizziness  MUSCULOSKELETAL:   left arm pain and intermittent left hip pain, mostly with movement       Vital Signs Last 24 Hrs  T(C): 36.8 (08 Feb 2022 05:38), Max: 36.8 (07 Feb 2022 14:56)  T(F): 98.2 (08 Feb 2022 05:38), Max: 98.3 (07 Feb 2022 14:56)  HR: 98 (08 Feb 2022 05:38) (81 - 98)  BP: 169/71 (08 Feb 2022 05:38) (131/74 - 169/71)  BP(mean): --  RR: 18 (08 Feb 2022 05:38) (18 - 18)  SpO2: 96% (08 Feb 2022 05:38) (95% - 98%)      PHYSICAL EXAM:  GENERAL: No acute distress  EYES: clear conjunctiva  ENMT: Moist mucous membranes  NECK: Supple, No JVD  CHEST/LUNG: Clear to auscultation bilaterally; No rales, rhonchi, wheezing, or rubs  HEART: S1, S2, Regular rate and rhythm  ABDOMEN: Soft, Nontender, Nondistended; Bowel sounds present  NEURO: Alert & Oriented to person place and situation   EXTREMITIES: left hip with occlusive dsg, left arm with LUE with split in place   : jalloh catheter in place.  draining                             10.6   38.21 )-----------( 353      ( 08 Feb 2022 07:31 )             32.2       02-08    140  |  111<H>  |  42<H>  ----------------------------<  130<H>  5.0   |  22  |  1.61<H>    Ca    8.4      08 Feb 2022 07:31    TPro  4.8<L>  /  Alb  1.9<L>  /  TBili  0.8  /  DBili  x   /  AST  43<H>  /  ALT  30  /  AlkPhos  65  02-07    RADIOLOGY & ADDITIONAL TESTS:    < from: CT Abdomen and Pelvis w/wo IV Cont (02.04.22 @ 15:00) >    ACC: [id number] EXAM:  CT ABDOMEN AND PELVIS WAW IC                          PROCEDURE DATE:  02/04/2022          INTERPRETATION:  CLINICAL INFORMATION: 89 years  Female with pelvic mass      Pelvic mass.    ADDITIONAL CLINICAL INFORMATION: Disorder of the urinary system N39.9    COMPARISON: 2/3/2022    CONTRAST/COMPLICATIONS:  IV Contrast: Omnipaque 350  90 cc administered   10 cc discarded  Oral Contrast: NONE  Complications: None reported at time of study completion    PROCEDURE:  CT of the Abdomen and Pelvis was performed.  Precontrast, Nephrographic and Excretory phases were acquired (CT   UROGRAM).    Sagittal and coronal reformats were performed.    FINDINGS:  LOWER CHEST: Bilateral lower lobe dependent atelectasis.    LIVER: Within normal limits.  BILE DUCTS: Normal caliber.  GALLBLADDER: Vicarious excretion of contrast in the gallbladder  SPLEEN: 5.6 x 4.0 cm rim calcified cyst.  PANCREAS: Within normal limits.  ADRENALS: Within normal limits.  KIDNEYS/URETERS: 2.7 cm left midpole cyst. 1.7 cm right lower pole cyst.   Bilateral hypodensities too small to characterize. No   hydroureteronephrosis. Symmetrical nephrograms.    BLADDER: Collapsed around Jalloh catheter. 10.7 x 9.7 x 10.0 cm complex   cystic pelvic mass with areas of internal soft tissue. Intraluminal   contrast extends around the medial margin of the mass suggesting bladder   etiology.  REPRODUCTIVE ORGANS: Uterus not visualized.    BOWEL: No bowel obstruction. Appendix is not visualized and cannot be   assessed. Sigmoid diverticulosis without diverticulitis. Retained fecal   matter throughout the colon.  PERITONEUM: Small pelvic ascites.  VESSELS: Atherosclerotic changes.  RETROPERITONEUM/LYMPH NODES: No lymphadenopathy.  ABDOMINAL WALL: 4.0 x 1.6 cm rightabdominal wall subcutaneous cyst.   Increasing subcutaneous edema overlying the left hip.  BONES: Comminuted angulated left intertrochanteric femoral neck fracture.   Avulsion of the lesser trochanter. Chronic healed left superior and   inferior pubic rami fracture. Chronic right sacral deformity. Severe T12   compression fracture. Moderate L2 compression fracture. Osteoporosis.    IMPRESSION:  Reidentified complex cystic pelvic mass effaces the bladder lumen.   Suspect bladder etiology. However,ovarian or uterine etiology cannot be   entirely excluded.    Comminuted angulated displaced left hip fracture with increasing   overlying subcutaneous edema.       from: Xray Elbow AP + Lateral + Oblique, Left (02.02.22 @ 12:10) >  ACC: [id number] EXAM:  XR ELBOW COMP MIN 3V LT                          PROCEDURE DATE:  02/02/2022        INTERPRETATION:  Left elbow    HISTORY: Patient fell     Three views of the left elbow show comminuted fracture of the olecranon   process with dislocation of the proximal olecranon along the distal   humerus. The radial head is in normal apposition with the distal humerus.    IMPRESSION: Olecranon fracture.    Thank you for this referral.    Imaging Personally Reviewed:  [ ] YES  [ ] NO

## 2022-02-08 NOTE — PROGRESS NOTE ADULT - ASSESSMENT
89yo Female lives alone at home, independent, ambulates sometimes with a rolator, baseline AAOx2-3 with PMH of Urinary incontinence, HTN and PSH of left mastectomy (left breast CA 30 yrs ago.  Presented to the ED after fall.  Admitted for left  olecranon fracture & left hip fracture. Ortho consulted   Pt had jalloh placed in ED and was found to have gross hematuria, CT abd and pelvis showed large pelvic mass suspicious for malignancy, bone scan was done and did not show any osseous metastasis, Heme/onc on board and was recommended for cystoscopy but pt refused cysto at this time   Pt was given 1 PRBC for optimization prior to OR   S/p L hip IM nailing on 2/6,     pending OR today 2/8  for ORIF of left olecranon fracture

## 2022-02-08 NOTE — PROGRESS NOTE ADULT - PROBLEM SELECTOR PLAN 3
-  Hematuria on admission   -  CT abd and pelvis showed large pelvic mass suspicious for malignancy  -  Bone scan without any evidence of osseous metastasis  -  Patient refusing cystoscopy   -  Heme/onc following  -  Urology following

## 2022-02-08 NOTE — PROGRESS NOTE ADULT - ASSESSMENT
89 yo female with gross hematuria presenting after fall with hip and left olecranon fracture. CT urogram demonstrate large pelvic mass of uncertain pathology, however suspicious for bladder primary.     -- labs reviewed.  -- Rise in creatinine today. Trend creatinine. Renal US if continues to rise and nephrology consult  -- Again discussed concern for likely malignancy with patient  -- Discussed in detail process for cystoscopy and possible biopsy to which patient refused   -- continue antibiotics  -- continue jalloh  -- f/u oncologic workup   -- will follow   -- Discussed with PA and house staff

## 2022-02-08 NOTE — PROGRESS NOTE ADULT - PROBLEM SELECTOR PLAN 1
-  s/p fall with  left hip and olecranon  fracture  -  s/p L hip IM nailing on 2/6  -  Scheduled for left Olecranon ORIF  -  WBAT LLE  -  Continue with pain management  -  PT recommended SETH when medically stable -  s/p fall with  left hip and olecranon  fracture  -  s/p L hip IM nailing on 2/6  -  Scheduled for left Olecranon ORIF  -  WBAT LLE  -  Continue with pain management:  Tylenol 1gram [id number], oxy 2.5 PRN  -  PT recommended SETH when medically stable

## 2022-02-09 NOTE — PHYSICAL THERAPY INITIAL EVALUATION ADULT - ACTIVE RANGE OF MOTION EXAMINATION, REHAB EVAL
Lt wrist and hand WFL, Lt elbow and shoulder not assessed at this time. Lt hip minimal active motion gravity eliminated. Lt knee few deg antigravity. Rt hip and knee ~1/4 range antigravity/Right UE Active ROM was WFL (within functional limits)
Lt wrist and hand WFL, Lt elbow not assessed and shoulder ~1/2range. Lt hip minimal active motion gravity eliminated. Lt knee few deg antigravity. Rt hip 1/4 range, and Rt knee WFL antigravity/Right UE Active ROM was WFL (within functional limits)

## 2022-02-09 NOTE — PROGRESS NOTE ADULT - PROBLEM SELECTOR PLAN 1
[facility] PT with acute left hip pain which is somatic in nature due to s/p fall + left hip fracture.  MRI neg for bony mets. bone scan negative for osseous mets. S/p L Hip IM Nailing POD#3 and ORIF of Left Olecranon Fracture POD#1  High risk medications reviewed. Avoid polypharmacy. Avoid IV opioids. Avoid NSAIDs and benzodiazepines. Non-pharmacological sleep aides initiated. Non-opioid medications and non-pharmacological pain management measures initiated.  Routine Meds  - Acetaminophen 975mg po q 8 hours prn  Mild Pain ( Pain Level - 1-3)  - Non - Pharmacological Measures  Moderate Pain (Pain Level 4-6)  - Oxycodone 2.5mg po q 4 hours prn  Severe Pain ( Pain Level - 7-10)  - no iv opioids  Bowel Regimen   - Senna and Miralax

## 2022-02-09 NOTE — PHYSICAL THERAPY INITIAL EVALUATION ADULT - ADDITIONAL COMMENTS
Pt reports ambulating w/o device, however chart notes she occasionally utilized rollator.   Pt denied own any equipment
Pt reports ambulating w/o device, however chart notes she occasionally utilized rollator.   Pt denied own any equipment

## 2022-02-09 NOTE — PROGRESS NOTE ADULT - SUBJECTIVE AND OBJECTIVE BOX
Source of information: , Chart review  Patient language: English  : n/a    CC:      This is a 89yFemale patient who presents to the hospital for Patient is a 89y old  Female who presents with a chief complaint of Left elbow and hip fracture (09 Feb 2022 08:09)  .       Patient stated goal for pain control: to be able to take deep breaths, get out of bed to chair and ambulate with tolerable pain control.     PAIN SCORE:         SCALE USED: (1-10 NRS)      PAST MEDICAL & SURGICAL HISTORY:  Urinary frequency    Hypertension        FAMILY HISTORY:        SOCIAL HISTORY:  [ ] Denies Smoking, Alcohol, or Drug Use    Allergies    No Known Allergies    Intolerances        MEDICATIONS:    MEDICATIONS  (STANDING):  acetaminophen     Tablet .. 1000 milliGRAM(s) Oral every 8 hours  enoxaparin Injectable 40 milliGRAM(s) SubCutaneous every 24 hours  ferrous    sulfate 325 milliGRAM(s) Oral two times a day  oxybutynin 10 milliGRAM(s) Oral two times a day  oxybutynin 5 milliGRAM(s) Oral two times a day  pantoprazole    Tablet 40 milliGRAM(s) Oral before breakfast  polyethylene glycol 3350 17 Gram(s) Oral daily  senna 2 Tablet(s) Oral at bedtime  sodium chloride 0.9%. 1000 milliLiter(s) (80 mL/Hr) IV Continuous <Continuous>    MEDICATIONS  (PRN):  ondansetron Injectable 4 milliGRAM(s) IV Push every 4 hours PRN Nausea and/or Vomiting  oxyCODONE    IR 2.5 milliGRAM(s) Oral every 4 hours PRN Moderate Pain (4 - 6)  oxyCODONE    IR 5 milliGRAM(s) Oral every 6 hours PRN Severe Pain (7 - 10)      Vital Signs Last 24 Hrs  T(C): 36.4 (09 Feb 2022 13:06), Max: 37 (08 Feb 2022 18:30)  T(F): 97.6 (09 Feb 2022 13:06), Max: 98.6 (08 Feb 2022 18:30)  HR: 87 (09 Feb 2022 13:06) (64 - 109)  BP: 127/78 (09 Feb 2022 13:06) (101/83 - 134/68)  BP(mean): 91 (08 Feb 2022 18:30) (80 - 109)  RR: 18 (09 Feb 2022 13:06) (16 - 20)  SpO2: 96% (09 Feb 2022 13:06) (94% - 100%)    LABS:                          9.3    32.21 )-----------( 355      ( 09 Feb 2022 07:44 )             28.7     02-09    140  |  111<H>  |  43<H>  ----------------------------<  87  4.7   |  23  |  1.35<H>    Ca    8.1<L>      09 Feb 2022 07:44      PT/INR - ( 08 Feb 2022 07:31 )   PT: 12.5 sec;   INR: 1.05 ratio         PTT - ( 08 Feb 2022 07:31 )  PTT:31.0 sec      CAPILLARY BLOOD GLUCOSE          Radiology:    Drug Screen:        REVIEW OF SYSTEMS:  CONSTITUTIONAL: No fever or fatigue  RESPIRATORY: No cough, wheezing, chills or hemoptysis; No shortness of breath  CARDIOVASCULAR: No chest pain, palpitations, dizziness, or leg swelling  GASTROINTESTINAL: No abdominal or epigastric pain. No nausea, vomiting; No diarrhea or constipation.   GENITOURINARY: No dysuria, frequency, hematuria, retention or incontinence  MUSCULOSKELETAL: No joint pain or swelling; No muscle, back, or extremity pain, no upper or lower motor strength weakness, no saddle anesthesia, bowel/bladder incontinence, no falls   NEURO: No headaches, No numbness/tingling b/l LE, No weakness  PSYCHIATRIC: No depression, anxiety, mood swings, or difficulty sleeping    PHYSICAL EXAM:  GENERAL:  Alert & Oriented X3, NAD, Good concentration  CHEST/LUNG: Clear to auscultation bilaterally; No rales, rhonchi, wheezing, or rubs  HEART: Regular rate and rhythm; No murmurs, rubs, or gallops  ABDOMEN: Soft, Nontender, Nondistended; Bowel sounds present  EXTREMITIES:  2+ Peripheral Pulses, No cyanosis, or edema  MUSCULOSKELETAL: + decreased rom Motor Strength 5/5 B/L upper and lower extremities; moves all extremities equally against gravity; ROM intact; negative SLR  SKIN: No rashes or lesions    Risk factors associated with adverse outcomes related to opioid treatment  [ ]  Concurrent benzodiazepine use  [ ]  History/ Active substance use or alcohol use disorder  [ ] Psychiatric co-morbidity  [ ] Sleep apnea  [ ] COPD  [ ] BMI> 35  [ ] Liver dysfunction  [ ] Renal dysfunction  [ ] CHF  [ ] Smoker  [ ]  Age > 60 years    [ ]  [place]  Reviewed and Copied to Chart. See below.    Plan of care and goal oriented pain management treatment options were discussed with patient and /or primary care giver; all questions and concerns were addressed and care was aligned with patient's wishes.    Educated patient on goal oriented pain management treatment options     02-09-22 @ 13:23 Source of information: , Chart review  Patient language: English  : n/a    CC:  left hip pain and left elbow pain    This is a 89yFemale patient who presents to the hospital for Patient is a 89y old  Female who presents with a chief complaint of Left elbow and hip fracture (09 Feb 2022 08:09)    Pt with S/p L Hip IM Nailing POD#3 and ORIF of Left Olecranon Fracture POD#1.  + left hip pain and left elbow pain.  + jalloh in place.      PAIN SCORE:  4/10       SCALE USED: (1-10 NRS)      PAST MEDICAL & SURGICAL HISTORY:  Urinary frequency    Hypertension        FAMILY HISTORY:        SOCIAL HISTORY:  [x ] Denies Smoking, Alcohol, or Drug Use    Allergies    No Known Allergies    Intolerances        MEDICATIONS:    MEDICATIONS  (STANDING):  acetaminophen     Tablet .. 1000 milliGRAM(s) Oral every 8 hours  enoxaparin Injectable 40 milliGRAM(s) SubCutaneous every 24 hours  ferrous    sulfate 325 milliGRAM(s) Oral two times a day  oxybutynin 10 milliGRAM(s) Oral two times a day  oxybutynin 5 milliGRAM(s) Oral two times a day  pantoprazole    Tablet 40 milliGRAM(s) Oral before breakfast  polyethylene glycol 3350 17 Gram(s) Oral daily  senna 2 Tablet(s) Oral at bedtime  sodium chloride 0.9%. 1000 milliLiter(s) (80 mL/Hr) IV Continuous <Continuous>    MEDICATIONS  (PRN):  ondansetron Injectable 4 milliGRAM(s) IV Push every 4 hours PRN Nausea and/or Vomiting  oxyCODONE    IR 2.5 milliGRAM(s) Oral every 4 hours PRN Moderate Pain (4 - 6)  oxyCODONE    IR 5 milliGRAM(s) Oral every 6 hours PRN Severe Pain (7 - 10)      Vital Signs Last 24 Hrs  T(C): 36.4 (09 Feb 2022 13:06), Max: 37 (08 Feb 2022 18:30)  T(F): 97.6 (09 Feb 2022 13:06), Max: 98.6 (08 Feb 2022 18:30)  HR: 87 (09 Feb 2022 13:06) (64 - 109)  BP: 127/78 (09 Feb 2022 13:06) (101/83 - 134/68)  BP(mean): 91 (08 Feb 2022 18:30) (80 - 109)  RR: 18 (09 Feb 2022 13:06) (16 - 20)  SpO2: 96% (09 Feb 2022 13:06) (94% - 100%)    LABS:                          9.3    32.21 )-----------( 355      ( 09 Feb 2022 07:44 )             28.7     02-09    140  |  111<H>  |  43<H>  ----------------------------<  87  4.7   |  23  |  1.35<H>    Ca    8.1<L>      09 Feb 2022 07:44      PT/INR - ( 08 Feb 2022 07:31 )   PT: 12.5 sec;   INR: 1.05 ratio         PTT - ( 08 Feb 2022 07:31 )  PTT:31.0 sec      CAPILLARY BLOOD GLUCOSE          Radiology:    Drug Screen:        REVIEW OF SYSTEMS:  CONSTITUTIONAL: No fever or fatigue  RESPIRATORY: No cough, wheezing, chills or hemoptysis; No shortness of breath  CARDIOVASCULAR: No chest pain, palpitations, dizziness, or leg swelling  GASTROINTESTINAL: No abdominal or epigastric pain. No nausea, vomiting; No diarrhea or constipation.   GENITOURINARY: + jalloh   MUSCULOSKELETAL: = left hip pain + left elbow pain   NEURO: No headaches, No numbness/tingling b/l LE, No weakness  PSYCHIATRIC: No depression, anxiety, mood swings, or difficulty sleeping    PHYSICAL EXAM:  GENERAL:  Alert & Oriented X3, NAD, Good concentration  CHEST/LUNG: decreased breath sounds  HEART: Regular rate and rhythm; No murmurs, rubs, or gallops  ABDOMEN: Soft, Nontender, Nondistended; Bowel sounds present  EXTREMITIES:  2+ Peripheral Pulses, No cyanosis, or edema  MUSCULOSKELETAL: + decreased rom + left hip tenderness + left elbow tenderness   SKIN: No rashes or lesions    Risk factors associated with adverse outcomes related to opioid treatment  [ ]  Concurrent benzodiazepine use  [ ]  History/ Active substance use or alcohol use disorder  [ ] Psychiatric co-morbidity  [ ] Sleep apnea  [ ] COPD  [ ] BMI> 35  [ ] Liver dysfunction  [ ] Renal dysfunction  [ ] CHF  [ ] Smoker  [ x]  Age > 60 years    [x  [place]  Reviewed and Copied to Chart. See below.    Plan of care and goal oriented pain management treatment options were discussed with patient and /or primary care giver; all questions and concerns were addressed and care was aligned with patient's wishes.    Educated patient on goal oriented pain management treatment options     02-09-22 @ 13:23

## 2022-02-09 NOTE — PHYSICAL THERAPY INITIAL EVALUATION ADULT - PASSIVE RANGE OF MOTION EXAMINATION, REHAB EVAL
Lt wrist and hand WFL, Lt elbow not assessed and shoulder ~1/2range/Right UE Passive ROM was WFL (within functional limits)/bilateral lower extremity Passive ROM was WFL (within functional limits)
Lt wrist and hand WFL, Lt elbow and shoulder not assessed at this time,/Right UE Passive ROM was WFL (within functional limits)/bilateral lower extremity Passive ROM was WFL (within functional limits)

## 2022-02-09 NOTE — PROGRESS NOTE ADULT - PROBLEM SELECTOR PLAN 9
-  Discharge to [facility] when medically stable

## 2022-02-09 NOTE — PHYSICAL THERAPY INITIAL EVALUATION ADULT - IMPAIRMENTS CONTRIBUTING IMPAIRED BED MOBILITY, REHAB EVAL
impaired balance/cognition/pain/impaired postural control/decreased ROM/decreased strength
impaired balance/impaired postural control/decreased ROM/decreased strength
Yes

## 2022-02-09 NOTE — PHYSICAL THERAPY INITIAL EVALUATION ADULT - LIVES WITH, PROFILE
in apt., elevator present, no stairs to access/alone
in apt., elevator present, no stairs to access/alone

## 2022-02-09 NOTE — PHYSICAL THERAPY INITIAL EVALUATION ADULT - MANUAL MUSCLE TESTING RESULTS, REHAB EVAL
RUE shoulder 3-/5, elbow and distal at least 4-/5. Lt writs and hand at least 3/4, Lt elbow and shoulder not assessed. Rt hip and knee 2+/5, Lt hip at least 1+/5 and knee at least 2-/5
RUE 4/5. Lt wrist and hand at least 3/5, Lt elbow not assessed. Lt shoulder 2+/5 Rt hip 2+/5, Rt knee 4/5, Lt hip at least 1+/5 and knee at least 2-/5. Ankles at least 3-/5 functionally

## 2022-02-09 NOTE — PROGRESS NOTE ADULT - PROBLEM SELECTOR PLAN 1
-  s/p fall with  left hip and olecranon  fracture  -  s/p L hip IM nailing on 2/6  -  s/p left Olecranon ORIF on 02/08  -  WBAT LLE  -  Continue with pain management:  Tylenol 1gram [id number], oxy 2.5 PRN  -  PT recommended [facility] when medically stable

## 2022-02-09 NOTE — PROGRESS NOTE ADULT - SUBJECTIVE AND OBJECTIVE BOX
Patient is a 89y old  Female who presents with a chief complaint of Left elbow and hip fracture (09 Feb 2022 13:23)    SUBJECTIVE / OVERNIGHT EVENTS: events noted. S/p ORIF of Left olecranon fx and left Hip IM nailing. Pt does not want to pursue w/u of bladder mass and does not want tx    MEDICATIONS  (STANDING):  acetaminophen     Tablet .. 1000 milliGRAM(s) Oral every 8 hours  enoxaparin Injectable 40 milliGRAM(s) SubCutaneous every 24 hours  ferrous    sulfate 325 milliGRAM(s) Oral two times a day  oxybutynin 10 milliGRAM(s) Oral two times a day  oxybutynin 5 milliGRAM(s) Oral two times a day  pantoprazole    Tablet 40 milliGRAM(s) Oral before breakfast  polyethylene glycol 3350 17 Gram(s) Oral daily  senna 2 Tablet(s) Oral at bedtime  sodium chloride 0.9%. 1000 milliLiter(s) (80 mL/Hr) IV Continuous <Continuous>    MEDICATIONS  (PRN):  ondansetron Injectable 4 milliGRAM(s) IV Push every 4 hours PRN Nausea and/or Vomiting  oxyCODONE    IR 2.5 milliGRAM(s) Oral every 4 hours PRN Moderate Pain (4 - 6)  oxyCODONE    IR 5 milliGRAM(s) Oral every 6 hours PRN Severe Pain (7 - 10)      Vital Signs Last 24 Hrs  T(C): 36.4 (09 Feb 2022 13:06), Max: 37 (08 Feb 2022 18:30)  T(F): 97.6 (09 Feb 2022 13:06), Max: 98.6 (08 Feb 2022 18:30)  HR: 87 (09 Feb 2022 13:06) (64 - 109)  BP: 127/78 (09 Feb 2022 13:06) (101/83 - 134/68)  BP(mean): 91 (08 Feb 2022 18:30) (80 - 109)  RR: 18 (09 Feb 2022 13:06) (16 - 20)  SpO2: 96% (09 Feb 2022 13:06) (94% - 100%)    GEN: NAD; A and O x 3, frail  LUNGS: CTA B/L  HEART: S1 S2  ABDOMEN: soft, non-tender, non-distended, + BS  : jalloh with clear yellow urine  EXTREMITIES: LUE splint in place with hand edeam, left hip dressing in place  NERVOUS SYSTEM:  Awake and alert; no focal neuro deficits    LABS:                        9.3    32.21 )-----------( 355      ( 09 Feb 2022 07:44 )             28.7     02-09    140  |  111<H>  |  43<H>  ----------------------------<  87  4.7   |  23  |  1.35<H>    Ca    8.1<L>      09 Feb 2022 07:44      PT/INR - ( 08 Feb 2022 07:31 )   PT: 12.5 sec;   INR: 1.05 ratio         PTT - ( 08 Feb 2022 07:31 )  PTT:31.0 sec          COVID-19 PCR: NotDetec (07 Feb 2022 18:39)  COVID-19 PCR: NotDetec (02 Feb 2022 12:27)

## 2022-02-09 NOTE — PHYSICAL THERAPY INITIAL EVALUATION ADULT - IMPAIRMENTS FOUND, PT EVAL
gait, locomotion, and balance/muscle strength/posture/ROM
gait, locomotion, and balance/muscle strength/posture/ROM

## 2022-02-09 NOTE — PHYSICAL THERAPY INITIAL EVALUATION ADULT - IMPAIRED TRANSFERS: SIT/STAND, REHAB EVAL
impaired balance/impaired postural control/decreased ROM/decreased strength
impaired balance/pain/impaired postural control/decreased ROM/decreased strength

## 2022-02-09 NOTE — PHYSICAL THERAPY INITIAL EVALUATION ADULT - CRITERIA FOR SKILLED THERAPEUTIC INTERVENTIONS
impairments found/functional limitations in following categories/risk reduction/prevention/rehab potential/therapy frequency/predicted duration of therapy intervention/anticipated discharge recommendation
impairments found/functional limitations in following categories/risk reduction/prevention/rehab potential/therapy frequency/predicted duration of therapy intervention/anticipated discharge recommendation

## 2022-02-09 NOTE — PHYSICAL THERAPY INITIAL EVALUATION ADULT - SKIN COLOR/CHARACTERISTICS
splint and wrap to LUE c/d/i; b/l lower legs dry, brownish/red, with increased redness Lt anterior/lateral calf (rawness?)
Serosanguinous Drainage to Lt thigh bandages at baseline, which increased during session. RN and Surgical PA made aware who came to assess pt.;; Distal LEs, reddish /brown and dry L>>R

## 2022-02-09 NOTE — PHYSICAL THERAPY INITIAL EVALUATION ADULT - PLANNED THERAPY INTERVENTIONS, PT EVAL
balance training/bed mobility training/gait training/neuromuscular re-education/postural re-education/ROM/strengthening/stretching/transfer training/wheelchair management/propulsion training
balance training/bed mobility training/gait training/neuromuscular re-education/postural re-education/ROM/strengthening/stretching/transfer training/wheelchair management/propulsion training

## 2022-02-09 NOTE — PHYSICAL THERAPY INITIAL EVALUATION ADULT - LEVEL OF INDEPENDENCE: SIT/STAND, REHAB EVAL
dependent (less than 25% patients effort)
tolerated 2 trials of 10 sec supported standing/maximum assist (25% patients effort)

## 2022-02-09 NOTE — PROGRESS NOTE ADULT - ASSESSMENT
Home    My Content    Search   Help   Log out  Prescription Monitoring Program Registry  Welcome Nicky Lamb×   Help  ×An MME Calculator is now available by clicking the MME tab on the purple task bar.  An informational sheet detailing this enhancement may be found by clicking ‘MME Calculator Help’ on the MME Calculator page.   This informational tool is a resource and is not meant for direct clinical application.   Patient Search   Multi-Patient Search   MME Calculator   Reports   Drug List   Designation   My MIGUELITO #  Data Detail Level: Printer-Friendly View Extended View  Confidential Drug Utilization Report  Search Terms: gareth yuriy, 10/08/1929Search Date: 02/04/2022 11:00:22 AM  The Drug Utilization Report below displays all of the controlled substance prescriptions, if any, that your patient has filled in the last twelve months. The information displayed on this report is compiled from pharmacy submissions to the Department, and accurately reflects the information as submitted by the pharmacies.    This report was requested by: Nicky Lamb | Reference #: [id number]    There are no results for the search terms that you entered.

## 2022-02-09 NOTE — PROGRESS NOTE ADULT - ATTENDING COMMENTS
89yo F PMHx of HTN, Breast CA s/p left mastectomy who presented to the ED after a fall. Found to have a left olecranon and left hip fractures. Verduzco placed with gross hematuria. Patient underwent CT A/P which showed large pelvic mass suspicious for malignancy. Bone scan negative for metastasis. Urology and hematology consulted for pelvic mass, but patient refuses further treatment. Patient underwent left hip IM nailing on 2/6. Patient went to the OR 2/08 for olecranon repair. WBC downtrending, suspect stress reaction due to recent surgery. Patient declines further work-up for any cancer. Completed treatment for UTI, monitor for fevers or other signs of infection. Verduzco leaking so was removed, follow TOV. Cr improving.

## 2022-02-09 NOTE — PROGRESS NOTE ADULT - SUBJECTIVE AND OBJECTIVE BOX
89yFemale    Diagnosis:  S/p L Hip IM Nailing POD#3 and ORIF of Left Olecranon Fracture POD#1    Patient was seen and evaluated at bedside. Patient with no acute complaints.   Pain is well controlled.   Denies CP/SOB, dyspnea, paresthesias, N/V/D, palpitations.     Vital Signs Last 24 Hrs  T(C): 36.6 (09 Feb 2022 05:47), Max: 37 (08 Feb 2022 18:30)  T(F): 97.8 (09 Feb 2022 05:47), Max: 98.6 (08 Feb 2022 18:30)  HR: 64 (09 Feb 2022 05:47) (64 - 109)  BP: 128/70 (09 Feb 2022 05:47) (101/83 - 136/73)  BP(mean): 91 (08 Feb 2022 18:30) (80 - 109)  RR: 16 (09 Feb 2022 05:47) (16 - 20)  SpO2: 97% (09 Feb 2022 05:47) (95% - 100%)        Physical Exam:    General: NAD, resting comfortably in bed.    Left UE: Splint intact at left arm. NVI. Left fingers with swelling, position changed on LUE on pillow to decrease swelling. ROM of fingers. Sensation intact.   Left Hip:  Proximal dressing with serosanguineous on gauze.  Skin is pink and warm. Staples intact. No erythema. SILT.  Wound with no drainage, healing well.   Lower extremity:  No calf tenderness, calves are soft. 2+pulses. NVI. EHL/TA/gastrocnemius intact B/L.  Good capillary refill. Warm, well-perfused.                                      10.5   39.51 )-----------( 367      ( 08 Feb 2022 17:45 )             32.4   02-08    140  |  115<H>  |  42<H>  ----------------------------<  107<H>  4.8   |  20<L>  |  1.27    Ca    7.6<L>      08 Feb 2022 19:33    TPro  4.8<L>  /  Alb  1.9<L>  /  TBili  0.8  /  DBili  x   /  AST  43<H>  /  ALT  30  /  AlkPhos  65  02-07          Impression:  89yFemale  S/p L Hip IM Nailing POD#3 and ORIF of Left Olecranon Fracture POD#1  Plan:  -  Pain management  -  Dvt prophylaxis with Lovenox  -  Daily Physical Theapy:  WBAT of left lower extremity and NWB of LUE  -  Keep LUE elevated where hand above elbow to decrease swelling at fingers

## 2022-02-09 NOTE — PHYSICAL THERAPY INITIAL EVALUATION ADULT - PERTINENT HX OF CURRENT PROBLEM, REHAB EVAL
Fall, s/p Lt Hip IM nailing, and now s/p Lt elbow ORIF POD #1.
Fall, now s/p Lt Hip IM nailing, POD #0. Also Olecranon fx

## 2022-02-09 NOTE — PROGRESS NOTE ADULT - ATTENDING COMMENTS
pt chart reviwed and d/w attending and ACP on case. 89 y/o admit for fall with multiple fracture and was found leukocytosis with UTI treated with iv antibiotics with treading down WBC but rising after surgery. she also has a large bladder mass suspicious for malignancy. She refused to be further workup for her bladder masses. We has no baseline of her prior WBC count. Her persistent WBC with neutrophils predominantly is more c/w reactive leukocytosis given fall/multiple fractures/multiple surgery/UTI/Bladder mass. However, I also could not rule out baseline chronic hematological malignancy eg. CMML/CML. Will f/u patient after her d/c'd for further workup if she is willing to do so. Will not initiate any further workup giving pt's age/ frail condition/refusal of workup for her bladder mass. I will off service and Dr. Lee will take over. we are signed out the case now. But please call for further question at [phone]. Thanks for your referral

## 2022-02-09 NOTE — PROGRESS NOTE ADULT - ASSESSMENT
Assessment and Plan:   · Assessment	    89 y/o with remote h/o breast cancer s/p left mastectomy otherwise still independent and able to perform ADL admit for fall with multiple fractures and also was found to have a large mass in pelvic    #large pelvic mass   likely bladder cancer  also possible other origin   on case   need tissue diagnosis / urine cytology, pt still refusing  pt refusing further work-up for bladder mass and although discussed possible tx options of chemo vs immunotherapy the pt still refused Bx and would NOT want systemic treatment    #elevated WBC - improving, reactive  likely due to stress and known UTI   on iv antibiotics   b/c/s negative     #anemia -stable  normocytic   likely due to multifactorial   no hematuria with her jalloh   iron studies wnl    #multiple fractures due to fall   ortho follwing s/p left hip repair and Left olecranon ORIF    at this time will sign-off, please reconsult if needed  Thank you for the referral. Will continue to monitor the patient.  Please call with any questions [phone]  Above reviewed with Attending Dr. Zoran CLARKE/[facility]  [address], [address], [place], [place]  [phone]

## 2022-02-09 NOTE — PROGRESS NOTE ADULT - PROBLEM SELECTOR PLAN 2
-  left  olecranon fracture  -  s/p  ORIF of left olecranon fracture   -  NPO after midnight   -  Meghana

## 2022-02-09 NOTE — PROGRESS NOTE ADULT - SUBJECTIVE AND OBJECTIVE BOX
NP Note discussed with Primary Attending.    Patient is a 89y old  Female who presents with a chief complaint of Left elbow and hip fracture (09 Feb 2022 13:52)      INTERVAL HPI/OVERNIGHT EVENTS: no new complaints    MEDICATIONS  (STANDING):  acetaminophen     Tablet .. 1000 milliGRAM(s) Oral every 8 hours  enoxaparin Injectable 40 milliGRAM(s) SubCutaneous every 24 hours  ferrous    sulfate 325 milliGRAM(s) Oral two times a day  oxybutynin 10 milliGRAM(s) Oral two times a day  pantoprazole    Tablet 40 milliGRAM(s) Oral before breakfast  polyethylene glycol 3350 17 Gram(s) Oral daily  senna 2 Tablet(s) Oral at bedtime  sodium chloride 0.9%. 1000 milliLiter(s) (80 mL/Hr) IV Continuous <Continuous>    MEDICATIONS  (PRN):  ondansetron Injectable 4 milliGRAM(s) IV Push every 4 hours PRN Nausea and/or Vomiting  oxyCODONE    IR 2.5 milliGRAM(s) Oral every 4 hours PRN Moderate Pain (4 - 6)  oxyCODONE    IR 5 milliGRAM(s) Oral every 6 hours PRN Severe Pain (7 - 10)      __________________________________________________  REVIEW OF SYSTEMS:    CONSTITUTIONAL: No fever,   EYES: no acute visual disturbances  NECK: No pain or stiffness  RESPIRATORY: No cough; No shortness of breath  CARDIOVASCULAR: No chest pain, no palpitations  GASTROINTESTINAL: No pain. No nausea or vomiting; No diarrhea   NEUROLOGICAL: No headache or numbness, no tremors  MUSCULOSKELETAL: s/p lt hip nailing, and LT olecranon fx repair.  GENITOURINARY: no dysuria, no frequency, no hesitancy  PSYCHIATRY: no depression , no anxiety  ALL OTHER  ROS negative        Vital Signs Last 24 Hrs  T(C): 36.4 (09 Feb 2022 13:06), Max: 37 (08 Feb 2022 18:30)  T(F): 97.6 (09 Feb 2022 13:06), Max: 98.6 (08 Feb 2022 18:30)  HR: 87 (09 Feb 2022 13:06) (64 - 98)  BP: 127/78 (09 Feb 2022 13:06) (101/83 - 131/67)  BP(mean): 91 (08 Feb 2022 18:30) (87 - 91)  RR: 18 (09 Feb 2022 13:06) (16 - 18)  SpO2: 96% (09 Feb 2022 13:06) (94% - 100%)    ________________________________________________  PHYSICAL EXAM:  GENERAL: NAD  HEENT: Normocephalic;  conjunctivae and sclerae clear; moist mucous membranes;   NECK : supple  CHEST/LUNG: Clear to auscultation bilaterally with good air entry   HEART: S1 S2  regular; no murmurs, gallops or rubs  ABDOMEN: Pelvic mass, Soft, Nontender, Nondistended; Bowel sounds present  EXTREMITIES: no cyanosis; no edema; no calf tenderness  SKIN: warm and dry; no rash  NERVOUS SYSTEM:  Awake and alert; Oriented  to place, person and time ; no new deficits    _________________________________________________  LABS:                        9.3    32.21 )-----------( 355      ( 09 Feb 2022 07:44 )             28.7     02-09    140  |  111<H>  |  43<H>  ----------------------------<  87  4.7   |  23  |  1.35<H>    Ca    8.1<L>      09 Feb 2022 07:44      PT/INR - ( 08 Feb 2022 07:31 )   PT: 12.5 sec;   INR: 1.05 ratio         PTT - ( 08 Feb 2022 07:31 )  PTT:31.0 sec    CAPILLARY BLOOD GLUCOSE            RADIOLOGY & ADDITIONAL TESTS:    ACC: [id number] EXAM:  CT ABDOMEN AND PELVIS [facility]                          PROCEDURE DATE:  02/04/2022          INTERPRETATION:  CLINICAL INFORMATION: 89 years  Female with pelvic mass      Pelvic mass.    ADDITIONAL CLINICAL INFORMATION: Disorder of the urinary system N39.9    COMPARISON: 2/3/2022    CONTRAST/COMPLICATIONS:  IV Contrast: Omnipaque 350  90 cc administered   10 cc discarded  Oral Contrast: NONE  Complications: None reported at time of study completion    PROCEDURE:  CT of the Abdomen and Pelvis was performed.  Precontrast, Nephrographic and Excretory phases were acquired (CT   UROGRAM).    Sagittal and coronal reformats were performed.    FINDINGS:  LOWER CHEST: Bilateral lower lobe dependent atelectasis.    LIVER: Within normal limits.  BILE DUCTS: Normal caliber.  GALLBLADDER: Vicarious excretion of contrast in the gallbladder  SPLEEN: 5.6 x 4.0 cm rim calcified cyst.  PANCREAS: Within normal limits.  ADRENALS: Within normal limits.  KIDNEYS/URETERS: 2.7 cm left midpole cyst. 1.7 cm right lower pole cyst.   Bilateral hypodensities too small to characterize. No   hydroureteronephrosis. Symmetrical nephrograms.    BLADDER: Collapsed around Verduzco catheter. 10.7 x 9.7 x 10.0 cm complex   cystic pelvic mass with areas of internal soft tissue. Intraluminal   contrast extends around the medial margin of the mass suggesting bladder   etiology.  REPRODUCTIVE ORGANS: Uterus not visualized.    BOWEL: No bowel obstruction. Appendix is not visualized and cannot be   assessed. Sigmoid diverticulosis without diverticulitis. Retained fecal   matter throughout the colon.  PERITONEUM: Small pelvic ascites.  VESSELS: Atherosclerotic changes.  RETROPERITONEUM/LYMPH NODES: No lymphadenopathy.  ABDOMINAL WALL: 4.0 x 1.6 cm rightabdominal wall subcutaneous cyst.   Increasing subcutaneous edema overlying the left hip.  BONES: Comminuted angulated left intertrochanteric femoral neck fracture.   Avulsion of the lesser trochanter. Chronic healed left superior and   inferior pubic rami fracture. Chronic right sacral deformity. Severe T12   compression fracture. Moderate L2 compression fracture. Osteoporosis.    IMPRESSION:  Reidentified complex cystic pelvic mass effaces the bladder lumen.   Suspect bladder etiology. However,ovarian or uterine etiology cannot be   entirely excluded.    Comminuted angulated displaced left hip fracture with increasing   overlying subcutaneous edema.        --- End of Report ---            NICK RIVAS MD; Attending Radiologist  This document has been electronically signed. Feb 4 2022  3:53PM    ACC: [id number] EXAM:  NM BONE IMG WHOLE BODY                          PROCEDURE DATE:  02/04/2022          INTERPRETATION:  CLINICAL INFORMATION: 89-year-old female neoplastic   pelvic mass, status post fall with left hip and left elbow fractures.   Evaluate for bony metastasis.    RADIOPHARMACEUTICAL: 20.1 mCi Tc-99m HDP, I.V.    TECHNIQUE:  Whole-body images were obtained in the anterior and posterior   projections approximately 2-3 hours following administration of   radiotracer.    COMPARISON: No prior bone scan.    FINDINGS: There is kyphoscoliosis, with heterogeneous radionuclide   distribution in the spine, probably due to degenerative disease. There is   physiologic distribution of radiotracer in the remainder of the   visualized osseous structures. No abnormal increased radionuclide   accumulation is seen in the left hip region. There is asymmetrical mild,   diffuse soft tissue activity/edema of the visualized left upper   extremity, the left elbow is not included in the field-of-view.    Both kidneys are visualized. Urinary bladder activity is confined to the   left side. Activity is present within the urinary drainage tubing.    IMPRESSION: Bone scan:    Kyphoscoliosis with degenerative changes in the spine.    No evidenceto suggest the presence of osseous metastasis.    No abnormal increased uptake in the left hip region.    --- End of Report ---            PARK OLMOS MD; Attending Nuclear Medicine  This document has been electronically signed. Feb 4 2022 11:53AM    ACC: [id number] EXAM:  MR PELVIS BONY ONLY                        ACC: [id number] EXAM:  MR FEMUR LT                          PROCEDURE DATE:  02/03/2022          INTERPRETATION:  EXAMINATION: MR FEMUR LEFT, MR PELVIS BONY    CLINICAL INDICATION:Left femur fracture. Evaluate for underlying lesion.    COMPARISON: CT abdomen pelvis 2/3/2022, CT pelvis and hip radiographs   dated 2/2/2022    TECHNIQUE: MRI of the left femur and bony pelvis was performed without   intravenous contrast.    INTERPRETATION:    Bone and bone marrow: There is prominence of the bone marrow in the   pelvis and lower lumbar spine.    There is a chronic, healed deformity of the sacrum. There is patchy bone   marrow edema within the sacrum at the level of S2 which likely represents   an insufficiency fracture. There is an angulated intertrochanteric   fracture of the left proximal femur with surrounding marrow edema,   without an underlying marrow replacing lesion or findings to suggest   pathological fracture. Partiallyimaged lower lumbar spine demonstrates   healed compression fractures as seen on prior CT.    Soft tissues: There are post traumatic changes in the soft tissues   surrounding the left hip region with edema within the iliopsoas muscle,   with tail musculature and adductor compartment as well as the hamstring.   There is a layering hematoma within the greater trochanteric bursa. This   measures approximately 3.2 x 6.6 x 7 cm. There is extensive muscle strain   in the proximal left thigh.    Other: There is a Verduzco catheter in the bladder. There is a large T2   intermediate mass in the pelvis intimately associated with the plantar   which has been previously described on CT measuring at least 11.7 x 9.9 x   8.8 cm highly concerning for malignancy.    IMPRESSION:    1. Left intertrochanteric fracture without underlying bony lesion at the   site of fracture. Varus angulation is present. Extensive soft tissue   changes including extensive muscle edema as detailed above, and 7 cm   layering hematoma in the region of the greater trochanteric bursa.    2. Sacral insufficiency fracture, a finding superimposed on chronic   healed deformity.    3. No convincing evidence of bony metastatic disease.    4. Large 11.7 cm pelvic mass previously described on abdominopelvic CT   highly concerning for neoplasm. This is intimately associated with the   bladder but could arise from other pelvic anatomy.    --- End of Report ---            SHLOMIT A GOLDBERG-STEIN MD; Attending Radiologist  This document has been electronically signed. Feb  3 2022  7:56PM    ACC: [id number] EXAM:  MR PELVIS BONY ONLY                        ACC: [id number] EXAM:  MR FEMUR LT                          PROCEDURE DATE:  02/03/2022          INTERPRETATION:  EXAMINATION: MR FEMUR LEFT, MR PELVIS BONY    CLINICAL INDICATION:Left femur fracture. Evaluate for underlying lesion.    COMPARISON: CT abdomen pelvis 2/3/2022, CT pelvis and hip radiographs   dated 2/2/2022    TECHNIQUE: MRI of the left femur and bony pelvis was performed without   intravenous contrast.    INTERPRETATION:    Bone and bone marrow: There is prominence of the bone marrow in the   pelvis and lower lumbar spine.    There is a chronic, healed deformity of the sacrum. There is patchy bone   marrow edema within the sacrum at the level of S2 which likely represents   an insufficiency fracture. There is an angulated intertrochanteric   fracture of the left proximal femur with surrounding marrow edema,   without an underlying marrow replacing lesion or findings to suggest   pathological fracture. Partiallyimaged lower lumbar spine demonstrates   healed compression fractures as seen on prior CT.    Soft tissues: There are post traumatic changes in the soft tissues   surrounding the left hip region with edema within the iliopsoas muscle,   with tail musculature and adductor compartment as well as the hamstring.   There is a layering hematoma within the greater trochanteric bursa. This   measures approximately 3.2 x 6.6 x 7 cm. There is extensive muscle strain   in the proximal left thigh.    Other: There is a Verduzco catheter in the bladder. There is a large T2   intermediate mass in the pelvis intimately associated with the plantar   which has been previously described on CT measuring at least 11.7 x 9.9 x   8.8 cm highly concerning for malignancy.    IMPRESSION:    1. Left intertrochanteric fracture without underlying bony lesion at the   site of fracture. Varus angulation is present. Extensive soft tissue   changes including extensive muscle edema as detailed above, and 7 cm   layering hematoma in the region of the greater trochanteric bursa.    2. Sacral insufficiency fracture, a finding superimposed on chronic   healed deformity.    3. No convincing evidence of bony metastatic disease.    4. Large 11.7 cm pelvic mass previously described on abdominopelvic CT   highly concerning for neoplasm. This is intimately associated with the   bladder but could arise from other pelvic anatomy.    --- End of Report ---            SHLOMIT A GOLDBERG-STEIN MD; Attending Radiologist  This document has been electronically signed. Feb  3 2022  7:56PM  ACC: [id number] EXAM:  CT ABDOMEN AND PELVIS IC                          PROCEDURE DATE:  02/03/2022          INTERPRETATION:  CLINICAL INFORMATION: 89 years  Female with Pelvic   malignancy.    COMPARISON: CT pelvis 2/2/2022    CONTRAST/COMPLICATIONS:  IV Contrast: Omnipaque 350  90 cc administered   10 cc discarded  Oral Contrast: NONE  Complications: None reported at time of study completion    PROCEDURE:  CT of the Abdomen and Pelvis was performed.  Sagittal and coronal reformats were performed.    FINDINGS:  LOWER CHEST: I lateral lower lobe discoid atelectasis.    LIVER: Within normal limits.  BILE DUCTS: Normal caliber.  GALLBLADDER: Within normal limits.  SPLEEN: 5.6 x 4.0 cm rim calcified cyst.  PANCREAS: Within normal limits.  ADRENALS: Within normal limits.  KIDNEYS/URETERS: 2.7 cm left midpole cyst. Bilateral hypodensities too   small to characterize. No hydroureteronephrosis or calculi.    BLADDER: Verduzco catheter in situ displaced to the left. Again noted is a   lobulated irregular 11.0 x 9.2 x 8.6 cm soft tissue mass with lobulated   rim enhancement and lobulated internal septations. Rim calcifications are   present as well.  REPRODUCTIVE ORGANS: A normal uterus is not visualized.    BOWEL: No bowel obstruction. Appendixnot visualized and cannot be   assessed.  PERITONEUM: No ascites.  VESSELS: Atherosclerotic changes. Pelvic varices.  RETROPERITONEUM/LYMPH NODES: No lymphadenopathy.  ABDOMINAL WALL: 4 x 1.6 cm right abdominal wall subcutaneous cyst (2:58).  BONES: Comminuted angulated left intertrochanteric femoral neck fracture.   Avulsion of the lesser trochanter. Chronic healed left superior and   inferior pubic rami fracture. Chronic right sacral deformity. Severe T12   compression fracture. Moderate L2 compression fracture. Osteoporosis.    IMPRESSION:  Acute left hip fracture. Please refer to CT pelvis report.    11.0 x 9.2 x 8.6 cm predominantly cystic or necrotic pelvic mass.   Consider bladder, uterine or ovarian neoplasm.    Diffuse osteoporosis limits evaluation for metastatic disease.    A preliminary report was provided by Dr. Dr. Madison Spencer. I agree   with the interpretation.          --- End of Report ---            NICK RIVAS MD; Attending Radiologist  This document has been electronically signed. Feb  3 2022  9:44AM    ACC: [id number] EXAM:  CT CHEST                          PROCEDURE DATE:  02/03/2022          INTERPRETATION:  CLINICAL INFORMATION: 89 years  Female with To r/o   malignancy.    COMPARISON: None.    CONTRAST/COMPLICATIONS:  IV Contrast: None  Oral Contrast: None  Complications: None    PROCEDURE:  CT of the Chest was performed.  Sagittal and coronal reformats were performed.    FINDINGS:    LUNGS AND AIRWAYS: Patent central airways.  8 mm left upper lobe nodule   (3:30). 1.5 cm lingular nodule (3:55). Bilateral lower lobe dependent   atelectasis.  PLEURA: No pleural effusion. Elevated left hemidiaphragm.  MEDIASTINUM AND ZOILA: No lymphadenopathy.  VESSELS: Low-attenuation blood pool consistent with anemia.   Atherosclerotic aorta without aneurysm.  HEART: Mild cardiomegaly. Trace pericardial effusion.  CHEST WALL AND LOWER NECK: Within normal limits.  VISUALIZED UPPER ABDOMEN: Within normal limits.  BONES: Severe T5, T7 and T12 compression fractures without osseous   retropulsion. Moderate L2 compression fracture. Diffuse osteoporosis.    IMPRESSION:  2 left lung nodules indeterminate for metastatic disease.    T5, T7 and T12 severe compression fractures without osseous retropulsion.   Osteoporosis. Cannot exclude underlying metastatic disease.        A preliminary report was provided by Dr. Dr. Madison Spencer . I agree   with the interpretation.    --- End of Report ---            NICK RIVAS MD; Attending Radiologist  This document has been electronically signed. Feb  3 01125:26AM      Imaging  Reviewed:  YES/NO    Consultant(s) Notes Reviewed:   YES/ No      Plan of care was discussed with patient and /or primary care giver; all questions and concerns were addressed

## 2022-02-09 NOTE — PHYSICAL THERAPY INITIAL EVALUATION ADULT - GENERAL OBSERVATIONS, REHAB EVAL
Consult received, chart reviewed. Patient received supine in bed, NAD, +LUE sling, IV, jalloh. Kyphotic. Patient agreed to EVALUATION from Physical Therapist.
Consult received, chart reviewed. Patient received supine in bed, NAD, +LUE sling, Kyphotic. Patient agreed to REEVALUATION from Physical Therapist. Pleasant cooperative and appreciative of care services

## 2022-02-10 NOTE — PROGRESS NOTE ADULT - ASSESSMENT
Home    My Content    Search   Help   Log out  Prescription Monitoring Program Registry  Welcome Nicky Lamb×   Help  ×An MME Calculator is now available by clicking the MME tab on the purple task bar.  An informational sheet detailing this enhancement may be found by clicking ‘MME Calculator Help’ on the MME Calculator page.   This informational tool is a resource and is not meant for direct clinical application.   Patient Search   Multi-Patient Search   MME Calculator   Reports   Drug List   Designation   My MIGUELITO #  Data Detail Level: Printer-Friendly View Extended View  Confidential Drug Utilization Report  Search Terms: gareth yuriy, 10/08/1929Search Date: 02/04/2022 11:00:22 AM  The Drug Utilization Report below displays all of the controlled substance prescriptions, if any, that your patient has filled in the last twelve months. The information displayed on this report is compiled from pharmacy submissions to the Department, and accurately reflects the information as submitted by the pharmacies.    This report was requested by: Nicky Lamb | Reference #: [id number]    There are no results for the search terms that you entered.

## 2022-02-10 NOTE — PROGRESS NOTE ADULT - PROBLEM SELECTOR PLAN 1
[facility] PT with acute left hip pain which is somatic in nature due to s/p fall + left hip fracture.  MRI neg for bony mets. bone scan negative for osseous mets. Pt s/p L Hip IM Nailing 2/6 POD#4 and ORIF of Left Olecranon Fracture 2/8 POD#2.  High risk medications reviewed. Avoid polypharmacy. Avoid IV opioids. Avoid NSAIDs and benzodiazepines. Non-pharmacological sleep aides initiated. Non-opioid medications and non-pharmacological pain management measures initiated.  Routine Meds  - Acetaminophen 1G po q 8 hours x 3 days  Mild Pain ( Pain Level - 1-3)  - Non - Pharmacological Measures  Moderate Pain (Pain Level 4-6)  - Oxycodone 2.5mg po q 4 hours prn  Severe Pain ( Pain Level - 7-10)  - Oxycodone 4mg PO q 4 hours prn   Bowel Regimen   - Senna and Miralax

## 2022-02-10 NOTE — PROGRESS NOTE ADULT - PROBLEM SELECTOR PLAN 3
-hematuria on admission, now resolved   -CT abd and pelvis showed large pelvic mass suspicious for malignancy  -Bone scan without any evidence of osseous metastasis  -pt refusing cystoscopy   -Heme/onc followed  -Urology following

## 2022-02-10 NOTE — PROGRESS NOTE ADULT - PROBLEM SELECTOR PLAN 2
-left  olecranon fracture  -s/p  ORIF of left olecranon fracture on 2/8  -NWB of LUE  -rest plan as above

## 2022-02-10 NOTE — PROGRESS NOTE ADULT - PROBLEM SELECTOR PLAN 6
-reactive leukocytosis vs malignancy, wbc trending down   -completed abx for UTI   -pt refusing further work up for pelvic mass at this time   -Heme followed

## 2022-02-10 NOTE — DISCHARGE NOTE PROVIDER - CARE PROVIDER_API CALL
Zeeshan Majano)  Orthopaedic Surgery; Sports Medicine  [address] [place] [address], [address] [place] [address]  Phone: [phone]  Fax: [phone]  Follow Up Time: 1 month   Zeeshan Majano)  Orthopaedic Surgery; Sports Medicine  [address], [address] [place] [address]  Phone: [phone]  Fax: [phone]  Follow Up Time: 1 month    ANDERS POWERS  Internal Medicine  N  RPIYA MCNAMARA, Phys,    Phone: ()-  Fax: ()-  Follow Up Time: 1 week    Jordyn Meehan  HEMATOLOGY  [address] [place] [address]  Phone: [phone]  Fax: [phone]  Follow Up Time: 2 weeks

## 2022-02-10 NOTE — DISCHARGE NOTE PROVIDER - NSDCCPTREATMENT_GEN_ALL_CORE_FT
PRINCIPAL PROCEDURE  Procedure: Insertion of locking intramedullary dara into left hip  Findings and Treatment: Pain Management  DVT Prophylaxis with Venodynes & Lovenox  D/c Staples on 2/23/22  D/c Lovenox on 2/23/22  Daily PT- WBAT with walker to the LLE. NWB to the LUE in sling.   Follow-Up with Orthopedic Surgeon after Rehab   Follow-up with Dr. Majano in 2-3 WEEKS at [phone]      SECONDARY PROCEDURE  Procedure: Open reduction and internal fixation of fracture of left olecranon  Findings and Treatment: Pain Management  DVT Prophylaxis with Venodynes & Lovenox  D/c Staples on 2/23/22  D/c Lovenox on 2/23/22  Daily PT- WBAT with walker to the LLE. NWB to the LUE in sling.   Follow-Up with Orthopedic Surgeon after Rehab   Follow-up with Dr. Majano in 2-3 WEEKS at [phone]

## 2022-02-10 NOTE — DISCHARGE NOTE PROVIDER - NSDCFUADDINST_GEN_ALL_CORE_FT
Daily Physical Therapy:  WBAT of left lower extremity and NWB of LUE  -  Keep LUE elevated w/ hand above elbow to decrease swelling at fingers

## 2022-02-10 NOTE — PROGRESS NOTE ADULT - ASSESSMENT
89 yo female with gross hematuria presenting after fall with hip and left olecranon fracture. CT urogram demonstrate large pelvic mass of uncertain pathology, however suspicious for bladder primary.     -- labs reviewed  -- Creatinine improved  -- Again discussed concern for likely malignancy with patient  -- Discussed in detail process for cystoscopy and possible biopsy to which patient refused   -- continue antibiotics  -- continue jalloh  -- Discussed with PA and house staff

## 2022-02-10 NOTE — PROGRESS NOTE ADULT - PROBLEM SELECTOR PLAN 9
-medically optimized for discharge to [facility], pending ins auth   -CM following

## 2022-02-10 NOTE — DISCHARGE NOTE PROVIDER - ATTENDING DISCHARGE PHYSICAL EXAMINATION:
Patient seen and examined. On physical exam, abdominal distention resolved, abdomen soft non distended. Patient had a BM yesterday. Ileus post-op resolved, continue taking miralax BID and bowel regimen. Patient has denied any work up for pelvic mass. Leukocytosis likely reactive after surgery as no source of infx was found and patient remained asymptomatic.   Patient underwent R hip hemiarthroplasty during this admission and recovering well. Plan for dc to [facility] today

## 2022-02-10 NOTE — DISCHARGE NOTE PROVIDER - PROVIDER TOKENS
PROVIDER:[TOKEN:[[id number]],FOLLOWUP:[1 month]] PROVIDER:[TOKEN:[[id number]],FOLLOWUP:[1 month]],PROVIDER:[TOKEN:[[id number]],FOLLOWUP:[1 week]],PROVIDER:[TOKEN:[[id number]],FOLLOWUP:[2 weeks]]

## 2022-02-10 NOTE — PROGRESS NOTE ADULT - PROBLEM SELECTOR PLAN 1
-s/p fall with  left hip and olecranon  fracture  -s/p L hip IM nailing on 2/6  -WBAT on LLE  -cont pain mgmt   -Ortho following   -pain mgmt following   -PT recommends SETH

## 2022-02-10 NOTE — DISCHARGE NOTE PROVIDER - NSDCMRMEDTOKEN_GEN_ALL_CORE_FT
acetaminophen 500 mg oral tablet: 2 tab(s) orally every 6 hours, As Needed - for moderate pain  enoxaparin: 40 milligram(s) injectable once a day  ferrous sulfate 325 mg (65 mg elemental iron) oral tablet: 1 tab(s) orally 2 times a day  losartan 50 mg oral tablet: 1 tab(s) orally once a day  ondansetron 4 mg oral tablet: 1 tab(s) orally every 8 hours, As Needed - for nausea  oxyCODONE 5 mg oral tablet: 1 tab(s) orally every 6 hours, As needed, Severe Pain (7 - 10)  pantoprazole 40 mg oral delayed release tablet: 1 tab(s) orally once a day (before a meal)  polyethylene glycol 3350 oral powder for reconstitution: 17 gram(s) orally once a day  senna oral tablet: 2 tab(s) orally once a day (at bedtime)  solifenacin 10 mg oral tablet: 1 tab(s) orally once a day   acetaminophen 500 mg oral tablet: 2 tab(s) orally every 6 hours, As Needed - for moderate pain  bisacodyl 5 mg oral delayed release tablet: 2 tab(s) orally once a day (at bedtime)  enoxaparin: 40 milligram(s) injectable once a day  ferrous sulfate 325 mg (65 mg elemental iron) oral tablet: 1 tab(s) orally 2 times a day  losartan 50 mg oral tablet: 1 tab(s) orally once a day  ondansetron 4 mg oral tablet: 1 tab(s) orally every 8 hours, As Needed - for nausea  oxyCODONE 5 mg oral tablet: 1 tab(s) orally every 6 hours, As needed, Severe Pain (7 - 10)  pantoprazole 40 mg oral delayed release tablet: 1 tab(s) orally once a day (before a meal)  polyethylene glycol 3350 oral powder for reconstitution: 17 gram(s) orally once a day  senna oral tablet: 2 tab(s) orally once a day (at bedtime)  solifenacin 10 mg oral tablet: 1 tab(s) orally once a day

## 2022-02-10 NOTE — DISCHARGE NOTE PROVIDER - CARE PROVIDERS DIRECT ADDRESSES
[email or web address]

## 2022-02-10 NOTE — DISCHARGE NOTE PROVIDER - NSDCCPCAREPLAN_GEN_ALL_CORE_FT
PRINCIPAL DISCHARGE DIAGNOSIS  Diagnosis: Hip fracture  Assessment and Plan of Treatment: Pain Management  DVT Prophylaxis with Venodynes & Lovenox  D/c Staples on 2/23/22  D/c Lovenox on 2/23/22  Daily PT- WBAT with walker to the LLE. NWB to the LUE in sling.   Follow-Up with Orthopedic Surgeon after Rehab   Follow-up with Dr. Majano in 2-3 WEEKS at [phone]       PRINCIPAL DISCHARGE DIAGNOSIS  Diagnosis: Hip fracture  Assessment and Plan of Treatment: You presented to the hospital after fall and were found to have left hip fracture and had the IM nail done on 2/6   Management  DVT Prophylaxis with Venodynes & Lovenox  D/c Staples on 2/23/22  D/c Lovenox on 2/23/22  Daily PT- WBAT with walker to the LLE. NWB to the LUE in sling.   Follow-Up with Orthopedic Surgeon after Rehab   Follow-up with Dr. Majano in 2-3 WEEKS at [phone]      SECONDARY DISCHARGE DIAGNOSES  Diagnosis: Left elbow fracture  Assessment and Plan of Treatment: You had the left elbow procedure done on 2/8   Continue splint as recommended   Follow up with Orthopedic within 2 weeks   Continue medications as prescribed    Diagnosis: Pelvic mass  Assessment and Plan of Treatment: you were found to have blood in your urine on admission. CT scan of abdomen and pelvis was done and showed a pelvic mass which is concerning for Malignancy. you were evaluated by Urologist and Hematologist/Oncologist and further workup was recommended, however you refused further work up at this time understanding the risks and benefits   Follow up with Hematology/Oncology outpatient for further follow up and recommendations     PRINCIPAL DISCHARGE DIAGNOSIS  Diagnosis: Hip fracture  Assessment and Plan of Treatment: You presented to the hospital after fall and were found to have left hip fracture and had the IM nail done on 2/6   Management  DVT Prophylaxis with Venodynes & Lovenox  D/c Staples on 2/23/22  D/c Lovenox on 2/23/22  Daily PT- WBAT with walker to the LLE. NWB to the LUE in sling.   Follow-Up with Orthopedic Surgeon after Rehab   Follow-up with Dr. Majano in 2-3 WEEKS at [phone]      SECONDARY DISCHARGE DIAGNOSES  Diagnosis: Left elbow fracture  Assessment and Plan of Treatment: You had the left elbow procedure done on 2/8   Continue splint as recommended   Follow up with Orthopedic within 2 weeks   Continue medications as prescribed    Diagnosis: Pelvic mass  Assessment and Plan of Treatment: On 2/2 you had a CT pelvis showed a large lobulated soft tissue density in curvilinear mineralization expected region of the urinary bladder/uterus likely related to neoplasm.   2/3 CT abdomen and pelvis showed a 11.0 x 9.2 x 8.6 cm predominantly cystic or necrotic pelvic mass. Concerning for bladder, uterine or ovarian neoplasm.  2/3 MR pelvis showed  Large 11.7 cm pelvic mass highly concerning for neoplasm.   2/4 CT abdomen and pelvis with contrast showed complex cystic pelvic mass effaces the bladder lumen. Suspect bladder etiology. However, ovarian or uterine etiology cannot be entirely excluded.  These findings are suspicious for malignancy.   While your were in the hospital you were evaluated by a Urologist and Hematologist/Oncologist. Additional workup was recommended, however you refused further work up at this time understanding the risks and benefits.  Please discuss these findings w/ your PCP and follow up with Hematology/Oncology for further follow up and recommendations

## 2022-02-10 NOTE — DISCHARGE NOTE PROVIDER - HOSPITAL COURSE
89yo Female lives alone at home, independent, ambulates sometimes with a rolator, baseline AAOx2-3 with PMH of Urinary incontinence, HTN and PSH of left mastectomy (left breast CA 30 yrs ago.  Presented to the ED after fall.  Admitted for left  olecranon fracture & left hip fracture. Ortho consulted   Pt had jalloh placed in ED and was found to have gross hematuria, CT abd and pelvis showed large pelvic mass suspicious for malignancy, bone scan was done and did not show any osseous metastasis, Heme/onc on board and was recommended for cystoscopy but pt refused cysto at this time.  Pt was given 1 PRBC for optimization prior to OR , S/p L hip IM nailing on 2/6, Patient s/p OR 2/8  for ORIF of left olecranon fracture.  PT evaluated and recommended SETH optimized for discharge to [facility]  Please note that this a brief summary of hospital course please refer to daily progress notes and consult notes for full course and events 89yo Female lives alone at home, independent, ambulates sometimes with a rolator, baseline AAOx2-3 with PMH of Urinary incontinence, HTN and PSH of left mastectomy (left breast CA 30 yrs ago.  Presented to the ED after fall.  Admitted for left  olecranon fracture & left hip fracture. Ortho consulted   Pt had jalloh placed in ED and was found to have gross hematuria, CT abd and pelvis showed large pelvic mass suspicious for malignancy, bone scan was done and did not show any osseous metastasis, Heme/onc on board and was recommended for cystoscopy but pt refused cysto at this time.  Pt was given 1 PRBC for optimization prior to OR , S/p L hip IM nailing on 2/6, Patient s/p OR 2/8  for ORIF of left olecranon fracture.      PT evaluated and recommended SETH optimized for discharge to [facility] however discharge was delayed due to post-op ileus, she was recommended to have bowel rest however patient adamantly refused and was instead started on a full liquid diet and her bowel regimen was enhanced.  She was also recommended to have enemas however she refused until 2/14/22.  She had bowel sounds and continued to pass flatus.  Please note that this a brief summary of hospital course please refer to daily progress notes and consult notes for full course and events 89yo Female lives alone at home, independent, ambulates sometimes with a rolator, baseline AAOx2-3 with PMH of Urinary incontinence, HTN and PSH of left mastectomy (left breast CA 30 yrs ago.  Presented to the ED after fall.  Admitted for left  olecranon fracture & left hip fracture. Ortho consulted   Pt had jalloh placed in ED and was found to have gross hematuria, CT abd and pelvis showed large pelvic mass suspicious for malignancy, bone scan was done and did not show any osseous metastasis, Heme/onc on board and was recommended for cystoscopy but pt refused cysto at this time.  Pt was given 1 PRBC for optimization prior to OR , S/p L hip IM nailing on 2/6, Patient s/p OR 2/8  for ORIF of left olecranon fracture.      PT evaluated and recommended SETH optimized for discharge to [facility] however discharge was delayed due to post-op ileus, she was recommended to have bowel rest however patient adamantly refused and was instead started on a full liquid diet and her bowel regimen was enhanced.  She was also recommended to have enemas however she refused until 2/14/22.  She had bowel sounds and continued to pass flatus.      Please note that this a brief summary of hospital course please refer to daily progress notes and consult notes for full course and events

## 2022-02-10 NOTE — PROGRESS NOTE ADULT - SUBJECTIVE AND OBJECTIVE BOX
Patient seen/examined. No events overnight. No complatints. Creatinine improved.    Vital Signs Last 24 Hrs  T(C): 36.5 (10 Feb 2022 05:27), Max: 36.8 (09 Feb 2022 21:30)  T(F): 97.7 (10 Feb 2022 05:27), Max: 98.3 (09 Feb 2022 21:30)  HR: 88 (10 Feb 2022 05:27) (88 - 89)  BP: 135/62 (10 Feb 2022 05:27) (135/62 - 148/68)  BP(mean): --  RR: 18 (10 Feb 2022 05:27) (17 - 18)  SpO2: 96% (10 Feb 2022 05:27) (92% - 96%)    General: NAD. AAOx3  Abd: soft. NT/ND                          9.9    29.48 )-----------( 358      ( 10 Feb 2022 07:01 )             29.2     02-10    139  |  108  |  44<H>  ----------------------------<  106<H>  4.1   |  25  |  1.23    Ca    8.4      10 Feb 2022 07:01    TPro  5.0<L>  /  Alb  1.8<L>  /  TBili  0.8  /  DBili  x   /  AST  15  /  ALT  11  /  AlkPhos  65  02-10

## 2022-02-10 NOTE — PROGRESS NOTE ADULT - ASSESSMENT
89yo Female lives alone at home, independent, ambulates sometimes with a rolator, baseline AAOx2-3 with PMH of Urinary incontinence, HTN and PSH of left mastectomy (left breast CA 30 yrs ago.  Presented to the ED after fall.  Admitted for left  olecranon fracture & left hip fracture. Ortho consulted   Pt had jalloh placed in ED and was found to have gross hematuria, CT abd and pelvis showed large pelvic mass suspicious for malignancy, bone scan was done and did not show any osseous metastasis, Heme/onc on board and was recommended for cystoscopy but pt refused cysto at this time.  Pt was given 1 PRBC for optimization prior to OR , S/p L hip IM nailing on 2/6, Patient s/p OR 2/8  for ORIF of left olecranon fracture.  PT evaluated and recommended SETH, pending ins auth for SETH placement

## 2022-02-10 NOTE — PROGRESS NOTE ADULT - SUBJECTIVE AND OBJECTIVE BOX
Patient is a 89y old  Female who presents with a chief complaint of Left elbow and hip fracture (10 Feb 2022 09:11)    OVERNIGHT EVENTS: no acute events overnight, verbalizing feeling tired     REVIEW OF SYSTEMS:  CONSTITUTIONAL: No fever, chills  RESPIRATORY: No cough, SOB  CARDIOVASCULAR: No chest pain, palpitations  GASTROINTESTINAL: No abdominal pain. No nausea, vomiting, or diarrhea  GENITOURINARY: No dysuria  NEUROLOGICAL: No HA  MUSCULOSKELETAL: left arm and hip pain       T(C): 36.5 (02-10-22 @ 05:27), Max: 36.8 (02-09-22 @ 21:30)  HR: 88 (02-10-22 @ 05:27) (87 - 89)  BP: 135/62 (02-10-22 @ 05:27) (127/78 - 148/68)  RR: 18 (02-10-22 @ 05:27) (17 - 18)  SpO2: 96% (02-10-22 @ 05:27) (92% - 96%)  Wt(kg): --Vital Signs Last 24 Hrs  T(C): 36.5 (10 Feb 2022 05:27), Max: 36.8 (09 Feb 2022 21:30)  T(F): 97.7 (10 Feb 2022 05:27), Max: 98.3 (09 Feb 2022 21:30)  HR: 88 (10 Feb 2022 05:27) (87 - 89)  BP: 135/62 (10 Feb 2022 05:27) (127/78 - 148/68)  BP(mean): --  RR: 18 (10 Feb 2022 05:27) (17 - 18)  SpO2: 96% (10 Feb 2022 05:27) (92% - 96%)    MEDICATIONS  (STANDING):  acetaminophen     Tablet .. 1000 milliGRAM(s) Oral every 8 hours  enoxaparin Injectable 40 milliGRAM(s) SubCutaneous every 24 hours  ferrous    sulfate 325 milliGRAM(s) Oral two times a day  oxybutynin 10 milliGRAM(s) Oral two times a day  pantoprazole    Tablet 40 milliGRAM(s) Oral before breakfast  polyethylene glycol 3350 17 Gram(s) Oral daily  senna 2 Tablet(s) Oral at bedtime  sodium chloride 0.9%. 1000 milliLiter(s) (80 mL/Hr) IV Continuous <Continuous>    MEDICATIONS  (PRN):  ondansetron Injectable 4 milliGRAM(s) IV Push every 4 hours PRN Nausea and/or Vomiting  oxyCODONE    IR 2.5 milliGRAM(s) Oral every 4 hours PRN Moderate Pain (4 - 6)  oxyCODONE    IR 5 milliGRAM(s) Oral every 6 hours PRN Severe Pain (7 - 10)      PHYSICAL EXAM:  GENERAL: frail   EYES: clear conjunctiva  ENMT: Moist mucous membranes  NECK: Supple, No JVD  CHEST/LUNG: Clear to auscultation bilaterally; No rales, rhonchi, wheezing, or rubs  HEART: S1, S2, Regular rate and rhythm  ABDOMEN: Soft, Nontender, Nondistended; Bowel sounds present  NEURO: Alert & Oriented to person, place and situation, disoriented to time   EXTREMITIES: left hip with occlusive dsg, LUE with splint, edema LUE, intact sensation       Consultant(s) Notes Reviewed:  [x ] YES  [ ] NO  Care Discussed with Consultants/Other Providers [ x] YES  [ ] NO    LABS:                        9.9    29.48 )-----------( 358      ( 10 Feb 2022 07:01 )             29.2     02-10    139  |  108  |  44<H>  ----------------------------<  106<H>  4.1   |  25  |  1.23    Ca    8.4      10 Feb 2022 07:01    TPro  5.0<L>  /  Alb  1.8<L>  /  TBili  0.8  /  DBili  x   /  AST  15  /  ALT  11  /  AlkPhos  65  02-10  CAPILLARY BLOOD GLUCOSE  RADIOLOGY & ADDITIONAL TESTS:    < from: CT Abdomen and Pelvis w/wo IV Cont (02.04.22 @ 15:00) >    ACC: [id number] EXAM:  CT ABDOMEN AND PELVIS WAW IC                          PROCEDURE DATE:  02/04/2022          INTERPRETATION:  CLINICAL INFORMATION: 89 years  Female with pelvic mass      Pelvic mass.    ADDITIONAL CLINICAL INFORMATION: Disorder of the urinary system N39.9    COMPARISON: 2/3/2022    CONTRAST/COMPLICATIONS:  IV Contrast: Omnipaque 350  90 cc administered   10 cc discarded  Oral Contrast: NONE  Complications: None reported at time of study completion    PROCEDURE:  CT of the Abdomen and Pelvis was performed.  Precontrast, Nephrographic and Excretory phases were acquired (CT   UROGRAM).    Sagittal and coronal reformats were performed.    FINDINGS:  LOWER CHEST: Bilateral lower lobe dependent atelectasis.    LIVER: Within normal limits.  BILE DUCTS: Normal caliber.  GALLBLADDER: Vicarious excretion of contrast in the gallbladder  SPLEEN: 5.6 x 4.0 cm rim calcified cyst.  PANCREAS: Within normal limits.  ADRENALS: Within normal limits.  KIDNEYS/URETERS: 2.7 cm left midpole cyst. 1.7 cm right lower pole cyst.   Bilateral hypodensities too small to characterize. No   hydroureteronephrosis. Symmetrical nephrograms.    BLADDER: Collapsed around Verduzco catheter. 10.7 x 9.7 x 10.0 cm complex   cystic pelvic mass with areas of internal soft tissue. Intraluminal   contrast extends around the medial margin of the mass suggesting bladder   etiology.  REPRODUCTIVE ORGANS: Uterus not visualized.    BOWEL: No bowel obstruction. Appendix is not visualized and cannot be   assessed. Sigmoid diverticulosis without diverticulitis. Retained fecal   matter throughout the colon.  PERITONEUM: Small pelvic ascites.  VESSELS: Atherosclerotic changes.  RETROPERITONEUM/LYMPH NODES: No lymphadenopathy.  ABDOMINAL WALL: 4.0 x 1.6 cm rightabdominal wall subcutaneous cyst.   Increasing subcutaneous edema overlying the left hip.  BONES: Comminuted angulated left intertrochanteric femoral neck fracture.   Avulsion of the lesser trochanter. Chronic healed left superior and   inferior pubic rami fracture. Chronic right sacral deformity. Severe T12   compression fracture. Moderate L2 compression fracture. Osteoporosis.    IMPRESSION:  Reidentified complex cystic pelvic mass effaces the bladder lumen.   Suspect bladder etiology. However,ovarian or uterine etiology cannot be   entirely excluded.    Comminuted angulated displaced left hip fracture with increasing   overlying subcutaneous edema.        < end of copied text >      Imaging Personally Reviewed:  [ ] YES  [ ] NO

## 2022-02-10 NOTE — PROGRESS NOTE ADULT - SUBJECTIVE AND OBJECTIVE BOX
Source of information: MARCOS STONE, Chart review  Patient language: English  : n/a    HPI:  Pt is a 89 yo F lives alone at home, independent, ambulates sometimes with a rolator, baseline AAOx2-3 with PMH of Urinary incontinence, HTN and PSH of left mastectomy (left breast CA 30 yrs ago) presents to the ED after she had a fall this morning at home. Pt was going to the bathroom around 3am and on the way tripped between her legs and fell down. She couldn't stand up after the incident and called for help. She did not hit her head on the floor or lose consciousness. Her neighbor heard her around 7am and helped her to get up and called EMS to come to the hospital. No h/o fever, headache, dizziness, abdominal pain, N/V. Pt was prescribed yesterday Solifenacin for urinary incontinence. No recent travel/illness.  (02 Feb 2022 18:03)      [facility]  + left hip fracture. + left elbow fx MRI neg for bony mets. bone scan negative for osseous mets. Pt s/p L Hip IM Nailing 2/6 POD#4 and ORIF of Left Olecranon Fracture 2/8 POD#2.   Pain consulted for left hip pain. Pt seen and examined at bedside. Pt laying in bed, denies pain. Reports left hip pain only upon turning or leaning on left side. Current pain regimen effectively managing patient's pain. + left arm cast and sling in place.  Pt tolerating PO diet. Pt denies lethargy, nausea, vomiting, constipation and itchiness. Patient stated goal for pain control: to be able to take deep breaths, get out of bed to chair and ambulate with tolerable pain control once postop.     PAST MEDICAL & SURGICAL HISTORY:  Urinary frequency    Hypertension    FAMILY HISTORY:      Social History:   [X ]Denies ETOH use, illicit drug use, and smoking     Allergies    No Known Allergies    MEDICATIONS  (STANDING):  acetaminophen     Tablet .. 1000 milliGRAM(s) Oral every 8 hours  bisacodyl Suppository 10 milliGRAM(s) Rectal once  enoxaparin Injectable 40 milliGRAM(s) SubCutaneous every 24 hours  ferrous    sulfate 325 milliGRAM(s) Oral two times a day  lactulose Syrup 10 Gram(s) Oral once  oxybutynin 10 milliGRAM(s) Oral two times a day  pantoprazole    Tablet 40 milliGRAM(s) Oral before breakfast  polyethylene glycol 3350 17 Gram(s) Oral daily  senna 2 Tablet(s) Oral at bedtime  sodium chloride 0.9%. 1000 milliLiter(s) (80 mL/Hr) IV Continuous <Continuous>    MEDICATIONS  (PRN):  ondansetron Injectable 4 milliGRAM(s) IV Push every 4 hours PRN Nausea and/or Vomiting  oxyCODONE    IR 2.5 milliGRAM(s) Oral every 4 hours PRN Moderate Pain (4 - 6)  oxyCODONE    IR 5 milliGRAM(s) Oral every 6 hours PRN Severe Pain (7 - 10)      Vital Signs Last 24 Hrs  T(C): 36.5 (10 Feb 2022 05:27), Max: 36.8 (09 Feb 2022 21:30)  T(F): 97.7 (10 Feb 2022 05:27), Max: 98.3 (09 Feb 2022 21:30)  HR: 88 (10 Feb 2022 05:27) (87 - 89)  BP: 135/62 (10 Feb 2022 05:27) (127/78 - 148/68)  BP(mean): --  RR: 18 (10 Feb 2022 05:27) (17 - 18)  SpO2: 96% (10 Feb 2022 05:27) (92% - 96%)  COVID-19 PCR: NotDetec (07 Feb 2022 18:39)  COVID-19 PCR: NotDetec (02 Feb 2022 12:27)    LABS: Reviewed                          9.9    29.48 )-----------( 358      ( 10 Feb 2022 07:01 )             29.2     02-10    139  |  108  |  44<H>  ----------------------------<  106<H>  4.1   |  25  |  1.23    Ca    8.4      10 Feb 2022 07:01    TPro  5.0<L>  /  Alb  1.8<L>  /  TBili  0.8  /  DBili  x   /  AST  15  /  ALT  11  /  AlkPhos  65  02-10      LIVER FUNCTIONS - ( 10 Feb 2022 07:01 )  Alb: 1.8 g/dL / Pro: 5.0 g/dL / ALK PHOS: 65 U/L / ALT: 11 U/L DA / AST: 15 U/L / GGT: x             CAPILLARY BLOOD GLUCOSE        COVID-19 PCR: NotDetec (07 Feb 2022 18:39)  COVID-19 PCR: NotDetec (02 Feb 2022 12:27)    Radiology: Reviewed  < from: NM Bone Imaging Total (02.04.22 @ 11:13) >    ACC: [id number] EXAM:  NM BONE IMG WHOLE BODY                          PROCEDURE DATE:  02/04/2022          INTERPRETATION:  CLINICAL INFORMATION: 89-year-old female neoplastic   pelvic mass, status post fall with left hip and left elbow fractures.   Evaluate for bony metastasis.    RADIOPHARMACEUTICAL: 20.1 mCi Tc-99m HDP, I.V.    TECHNIQUE:  Whole-body images were obtained in the anterior and posterior   projections approximately 2-3 hours following administration of   radiotracer.    COMPARISON: No prior bone scan.    FINDINGS: There is kyphoscoliosis, with heterogeneous radionuclide   distribution in the spine, probably due to degenerative disease. There is   physiologic distribution of radiotracer in the remainder of the   visualized osseous structures. No abnormal increased radionuclide   accumulation is seen in the left hip region. There is asymmetrical mild,   diffuse soft tissue activity/edema of the visualized left upper   extremity, the left elbow is not included in the field-of-view.    Both kidneys are visualized. Urinary bladder activity is confined to the   left side. Activity is present within the urinary drainage tubing.    IMPRESSION: Bone scan:    Kyphoscoliosis with degenerative changes in the spine.    No evidenceto suggest the presence of osseous metastasis.    No abnormal increased uptake in the left hip region.    --- End of Report ---            PARK OLMOS MD; Attending Nuclear Medicine  This document has been electronically signed. Feb 4 2022 11:53AM    < end of copied text >    < from: CT Abdomen and Pelvis w/wo IV Cont (02.04.22 @ 15:00) >    ACC: [id number] EXAM:  CT ABDOMEN AND PELVIS WAW IC                          PROCEDURE DATE:  02/04/2022          INTERPRETATION:  CLINICAL INFORMATION: 89 years  Female with pelvic mass      Pelvic mass.    ADDITIONAL CLINICAL INFORMATION: Disorder of the urinary system N39.9    COMPARISON: 2/3/2022    CONTRAST/COMPLICATIONS:  IV Contrast: Omnipaque 350  90 cc administered   10 cc discarded  Oral Contrast: NONE  Complications: None reported at time of study completion    PROCEDURE:  CT of the Abdomen and Pelvis was performed.  Precontrast, Nephrographic and Excretory phases were acquired (CT   UROGRAM).    Sagittal and coronal reformats were performed.    FINDINGS:  LOWER CHEST: Bilateral lower lobe dependent atelectasis.    LIVER: Within normal limits.  BILE DUCTS: Normal caliber.  GALLBLADDER: Vicarious excretion of contrast in the gallbladder  SPLEEN: 5.6 x 4.0 cm rim calcified cyst.  PANCREAS: Within normal limits.  ADRENALS: Within normal limits.  KIDNEYS/URETERS: 2.7 cm left midpole cyst. 1.7 cm right lower pole cyst.   Bilateral hypodensities too small to characterize. No   hydroureteronephrosis. Symmetrical nephrograms.    BLADDER: Collapsed around Verduzco catheter. 10.7 x 9.7 x 10.0 cm complex   cystic pelvic mass with areas of internal soft tissue. Intraluminal   contrast extends around the medial margin of the mass suggesting bladder   etiology.  REPRODUCTIVE ORGANS: Uterus not visualized.    BOWEL: No bowel obstruction. Appendix is not visualized and cannot be   assessed. Sigmoid diverticulosis without diverticulitis. Retained fecal   matter throughout the colon.  PERITONEUM: Small pelvic ascites.  VESSELS: Atherosclerotic changes.  RETROPERITONEUM/LYMPH NODES: No lymphadenopathy.  ABDOMINAL WALL: 4.0 x 1.6 cm rightabdominal wall subcutaneous cyst.   Increasing subcutaneous edema overlying the left hip.  BONES: Comminuted angulated left intertrochanteric femoral neck fracture.   Avulsion of the lesser trochanter. Chronic healed left superior and   inferior pubic rami fracture. Chronic right sacral deformity. Severe T12   compression fracture. Moderate L2 compression fracture. Osteoporosis.    IMPRESSION:  Reidentified complex cystic pelvic mass effaces the bladder lumen.   Suspect bladder etiology. However,ovarian or uterine etiology cannot be   entirely excluded.    Comminuted angulated displaced left hip fracture with increasing   overlying subcutaneous edema.        --- End of Report ---            NICK RIVAS MD; Attending Radiologist  This document has been electronically signed. Feb 4 2022  3:53PM    < end of copied text >    < from: Xray Elbow AP + Lateral + Oblique, Left (02.02.22 @ 12:10) >    ACC: [id number] EXAM:  XR ELBOW COMP MIN 3V LT                          PROCEDURE DATE:  02/02/2022          INTERPRETATION:  Left elbow    HISTORY: Patient fell     Three views of the left elbow show comminuted fracture of the olecranon   process with dislocation of the proximal olecranon along the distal   humerus. The radial head is in normal apposition with the distal humerus.    IMPRESSION: Olecranon fracture.      Thank you for this referral.    --- End of Report ---            SAVANNAH KOWALSKI MD; Attending Interventional Radiologist  This document has been electronically signed. Feb 2 2022  1:40PM    < end of copied text >      ORT Score -   Family Hx of substance abuse	Female	      Male  Alcohol 	                                           1                     3  Illegal drugs	                                   2                     3  Rx drugs                                           4 	                  4  Personal Hx of substance abuse		  Alcohol 	                                          3	                  3  Illegal drugs                                     4	                  4  Rx drugs                                            5 	                  5  Age between 16- 45 years	           1                     1  hx preadolescent sexual abuse	   3 	                  0  Psychological disease		  ADD, OCD, bipolar, schizophrenia   2	          2  Depression                                           1 	          1  Total: 0    a score of 3 or lower indicates low risk for opioid abuse		  a score of 4-7 indicates moderate risk for opioid abuse		  a score of 8 or higher indicates high risk for opioid abuse    REVIEW OF SYSTEMS:  CONSTITUTIONAL: No fever or fatigue  HEENT:  No difficulty hearing, no change in vision  NECK: No pain or stiffness  RESPIRATORY: No cough, wheezing, chills or hemoptysis; No shortness of breath  CARDIOVASCULAR: No chest pain, palpitations, dizziness, or leg swelling  GASTROINTESTINAL: No loss of appetite, decreased PO intake. No abdominal or epigastric pain. No nausea, vomiting; No diarrhea or constipation.   GENITOURINARY: No dysuria, frequency, hematuria, retention or incontinence  MUSCULOSKELETAL: + decreased ROM left arm and left leg; + occasional left arm and left hip joint pain. + left hip swelling; no upper or lower motor strength weakness, + bowel/bladder incontinence, +falls   NEURO: No headaches, No numbness/tingling b/l LE    PHYSICAL EXAM:  GENERAL:  Alert & Oriented X4, cooperative, NAD, Good concentration. Speech is clear.   RESPIRATORY: Respirations even and unlabored. Clear to auscultation bilaterally; No rales, rhonchi, wheezing, or rubs  CARDIOVASCULAR: Normal S1/S2, regular rate and rhythm; No murmurs, rubs, or gallops. No JVD.   GASTROINTESTINAL:  Soft, Nontender, Nondistended; Bowel sounds present  PERIPHERAL VASCULAR:  Extremities warm with + left hip edema. + left hand edema. 2+ Peripheral Pulses, No cyanosis, No calf tenderness  MUSCULOSKELETAL: Motor Strength 5/5 B/L upper and lower extremities; + decreased ROM left upper and lower extremity + left hip tenderness on palpation of all joints. + left LE shortened and externally rotated, + left arm sling and brace in place.   SKIN: Warm, dry, intact. No rashes, lesions, scars or wounds.     Risk factors associated with adverse outcomes related to opioid treatment  [ ]  Concurrent benzodiazepine use  [ ]  History/ Active substance use or alcohol use disorder  [ ] Psychiatric co-morbidity  [ ] Sleep apnea  [ ] COPD  [ ] BMI> 35  [ ] Liver dysfunction  [ ] Renal dysfunction  [ ] CHF  [ ] Smoker  [ X]  Age > 60 years    [X ]  [place]  Reviewed and Copied to Chart. See below.    Plan of care and goal oriented pain management treatment options were discussed with patient and /or primary care giver; all questions and concerns were addressed and care was aligned with patient's wishes.    Educated patient on goal oriented pain management treatment options     02-10-22 @ 12:42

## 2022-02-10 NOTE — PROGRESS NOTE ADULT - ATTENDING COMMENTS
89yo F PMHx of HTN, Breast CA s/p left mastectomy who presented to the ED after a fall. Found to have a left olecranon and left hip fractures. Verduzco placed with gross hematuria. Patient underwent CT A/P which showed large pelvic mass suspicious for malignancy. Bone scan negative for metastasis. Urology and hematology consulted for pelvic mass, but patient refuses further treatment. Patient underwent left hip IM nailing on 2/6. Patient went to the OR 2/08 for olecranon repair. WBC downtrending, suspect stress reaction due to recent surgery. Patient declines further work-up for any cancer. Completed treatment for UTI, monitor for fevers or other signs of infection. Patient passed trial of void. Abdomen distended, patient likely constipated. Will change iron supplementation to every other day. If no movement, can consider suppository or enema tomorrow. Encourage ambulation, sitting in chair. Patient ready for discharge to [facility].

## 2022-02-10 NOTE — PROGRESS NOTE ADULT - SUBJECTIVE AND OBJECTIVE BOX
89yFemale    Diagnosis:  S/p L Hip IM Nailing POD#4 and ORIF of Left Olecranon Fracture POD#2    Patient was seen and evaluated at bedside. Patient with no acute complaints.   Pain is well controlled.   Denies CP/SOB, dyspnea, paresthesias, N/V/D, palpitations.       Vital Signs Last 24 Hrs  T(C): 36.5 (02-10-22 @ 05:27), Max: 36.8 (02-09-22 @ 21:30)  T(F): 97.7 (02-10-22 @ 05:27), Max: 98.3 (02-09-22 @ 21:30)  HR: 88 (02-10-22 @ 05:27) (82 - 89)  BP: 135/62 (02-10-22 @ 05:27) (127/78 - 148/68)  BP(mean): --  RR: 18 (02-10-22 @ 05:27) (17 - 18)  SpO2: 96% (02-10-22 @ 05:27) (92% - 96%)            Physical Exam:    General: NAD, resting comfortably in bed.    Left UE: Splint intact at left arm. NVI. Left fingers with swelling, position adjusted on LUE on pillow to decrease swelling. ROM of fingers. Sensation intact.   Left Hip:  Proximal dressing with some serosanguineous staining on gauze. dressing changed. Skin is pink and warm. Staples intact. No erythema. SILT.  Wound with no drainage, healing well.   Lower extremity:  No calf tenderness, calves are soft. 2+pulses. NVI. EHL/TA/gastrocnemius intact B/L.  Good capillary refill. Warm, well-perfused.                                                 9.9    29.48 )-----------( 358      ( 10 Feb 2022 07:01 )             29.2   02-10    139  |  108  |  44<H>  ----------------------------<  106<H>  4.1   |  25  |  1.23    Ca    8.4      10 Feb 2022 07:01    TPro  5.0<L>  /  Alb  1.8<L>  /  TBili  0.8  /  DBili  x   /  AST  15  /  ALT  11  /  AlkPhos  65  02-10          Impression:  89yFemale  S/p L Hip IM Nailing POD#4 and ORIF of Left Olecranon Fracture POD#2  Plan:  -  Pain management  -  Dvt prophylaxis with Lovenox  -  Daily Physical Theapy:  WBAT of left lower extremity and NWB of LUE  -  Keep LUE elevated where hand above elbow to decrease swelling at fingers  -  discharge planning [facility]

## 2022-02-11 NOTE — DIETITIAN INITIAL EVALUATION ADULT. - PERTINENT MEDS FT
MEDICATIONS  (STANDING):  acetaminophen     Tablet .. 1000 milliGRAM(s) Oral every 8 hours  ceFAZolin   IVPB      ceFAZolin   IVPB 2000 milliGRAM(s) IV Intermittent once  ceFAZolin   IVPB 2000 milliGRAM(s) IV Intermittent every 8 hours  enoxaparin Injectable 40 milliGRAM(s) SubCutaneous every 24 hours  ferrous    sulfate 325 milliGRAM(s) Oral <User Schedule>  oxybutynin 10 milliGRAM(s) Oral two times a day  pantoprazole    Tablet 40 milliGRAM(s) Oral before breakfast  polyethylene glycol 3350 17 Gram(s) Oral daily  senna 2 Tablet(s) Oral at bedtime  sodium chloride 0.9%. 1000 milliLiter(s) (80 mL/Hr) IV Continuous <Continuous>    MEDICATIONS  (PRN):  ondansetron Injectable 4 milliGRAM(s) IV Push every 4 hours PRN Nausea and/or Vomiting  oxyCODONE    IR 2.5 milliGRAM(s) Oral every 4 hours PRN Moderate Pain (4 - 6)  oxyCODONE    IR 5 milliGRAM(s) Oral every 6 hours PRN Severe Pain (7 - 10)

## 2022-02-11 NOTE — PROGRESS NOTE ADULT - PROBLEM SELECTOR PLAN 1
[facility] PT with acute left hip pain which is somatic in nature due to s/p fall + left hip fracture.  MRI neg for bony mets. bone scan negative for osseous mets. Pt s/p L Hip IM Nailing 2/6 POD#5 and ORIF of Left Olecranon Fracture 2/8 POD#3.  High risk medications reviewed. Avoid polypharmacy. Avoid IV opioids. Avoid NSAIDs and benzodiazepines. Non-pharmacological sleep aides initiated. Non-opioid medications and non-pharmacological pain management measures initiated.  Routine Meds  - Acetaminophen 1G po q 8 hours x 3 days  Mild Pain ( Pain Level - 1-3)  - Non - Pharmacological Measures  Moderate Pain (Pain Level 4-6)  - Oxycodone 2.5mg po q 4 hours prn  Severe Pain ( Pain Level - 7-10)  - Oxycodone 4mg PO q 4 hours prn   Bowel Regimen   - Senna and Miralax  Monitor dressing.  SETH placement [facility] PT with acute left hip pain which is somatic in nature due to s/p fall + left hip fracture.  MRI neg for bony mets. bone scan negative for osseous mets. Pt s/p L Hip IM Nailing 2/6 POD#5 and ORIF of Left Olecranon Fracture 2/8 POD#3.  High risk medications reviewed. Avoid polypharmacy. Avoid IV opioids. Avoid NSAIDs and benzodiazepines. Non-pharmacological sleep aides initiated. Non-opioid medications and non-pharmacological pain management measures initiated.  Routine Meds  - Acetaminophen 1G po q 8 hours x 3 days  Mild Pain ( Pain Level - 1-3)  - Non - Pharmacological Measures  Moderate Pain (Pain Level 4-6)  - dc opioids   Severe Pain ( Pain Level - 7-10)  - all opioids dc   Bowel Regimen   - Senna and Miralax  Monitor dressing.  SETH placement  + ? ileus - last opioid use 2/8 - oxycodone 2.5

## 2022-02-11 NOTE — PROGRESS NOTE ADULT - ASSESSMENT
Home    My Content    Search   Help   Log out  Prescription Monitoring Program Registry  Welcome Nicky Lamb×   Help  ×An MME Calculator is now available by clicking the MME tab on the purple task bar.  An informational sheet detailing this enhancement may be found by clicking ‘MME Calculator Help’ on the MME Calculator page.   This informational tool is a resource and is not meant for direct clinical application.   Patient Search   Multi-Patient Search   MME Calculator   Reports   Drug List   Designation   My MIGUELITO #  Data Detail Level: Printer-Friendly View Extended View  Confidential Drug Utilization Report  Search Terms: gareth yuriy, 10/08/1929Search Date: 02/04/2022 11:00:22 AM  The Drug Utilization Report below displays all of the controlled substance prescriptions, if any, that your patient has filled in the last twelve months. The information displayed on this report is compiled from pharmacy submissions to the Department, and accurately reflects the information as submitted by the pharmacies.    This report was requested by: Nicky Lamb | Reference #: [id number]    There are no results for the search terms that you entered.

## 2022-02-11 NOTE — PROGRESS NOTE ADULT - ATTENDING COMMENTS
89yo F PMHx of HTN, Breast CA s/p left mastectomy who presented to the ED after a fall. Found to have a left olecranon and left hip fractures. Verduzco placed with gross hematuria. Patient underwent CT A/P which showed large pelvic mass suspicious for malignancy. Bone scan negative for metastasis. Urology and hematology consulted for pelvic mass, but patient refuses further treatment. Patient underwent left hip IM nailing on 2/6. Patient went to the OR 2/08 for olecranon repair. WBC downtrending, suspect stress reaction due to recent surgery. Patient declines further work-up for any cancer. Completed treatment for UTI, monitor for fevers or other signs of infection. Patient passed trial of void. Abdomen distended, patient likely constipated. AXR ordered which shows stool, but radiology read suggesting air-fluid levels suggestive of ileus. Patient currently reports having small bowel-movements, no nausea or vomiting so will continue to monitor. Discontinue opioids. Encourage ambulation and sitting upright. Also noted to have left hip wound dehiscence, started on cefazolin by primary team. Will continue to monitor for fever. WBC downtrending which is re-assuring. Would hold off on discharge until resolution of abdominal distension and improvement in left hip wound.

## 2022-02-11 NOTE — PROGRESS NOTE ADULT - SUBJECTIVE AND OBJECTIVE BOX
Patient is a 89y old  Female who presents with a chief complaint of Left elbow and hip fracture (10 Feb 2022 14:50)    OVERNIGHT EVENTS: no acute events overnight, asking to go to rehab     REVIEW OF SYSTEMS:  CONSTITUTIONAL: No fever, chills  RESPIRATORY: No cough, SOB  CARDIOVASCULAR: No chest pain, palpitations  GASTROINTESTINAL: No abdominal pain. No nausea, vomiting, or diarrhea  GENITOURINARY: No dysuria  NEUROLOGICAL: No HA  MUSCULOSKELETAL: left arm and hip pain         T(C): 36.4 (02-11-22 @ 05:35), Max: 37.3 (02-10-22 @ 21:13)  HR: 80 (02-11-22 @ 05:35) (78 - 95)  BP: 143/91 (02-11-22 @ 05:35) (129/97 - 143/91)  RR: 18 (02-11-22 @ 05:35) (18 - 18)  SpO2: 99% (02-11-22 @ 05:35) (96% - 99%)  Wt(kg): --Vital Signs Last 24 Hrs  T(C): 36.4 (11 Feb 2022 05:35), Max: 37.3 (10 Feb 2022 21:13)  T(F): 97.6 (11 Feb 2022 05:35), Max: 99.2 (10 Feb 2022 21:13)  HR: 80 (11 Feb 2022 05:35) (78 - 95)  BP: 143/91 (11 Feb 2022 05:35) (129/97 - 143/91)  BP(mean): --  RR: 18 (11 Feb 2022 05:35) (18 - 18)  SpO2: 99% (11 Feb 2022 05:35) (96% - 99%)    MEDICATIONS  (STANDING):  acetaminophen     Tablet .. 1000 milliGRAM(s) Oral every 8 hours  enoxaparin Injectable 40 milliGRAM(s) SubCutaneous every 24 hours  ferrous    sulfate 325 milliGRAM(s) Oral <User Schedule>  oxybutynin 10 milliGRAM(s) Oral two times a day  pantoprazole    Tablet 40 milliGRAM(s) Oral before breakfast  polyethylene glycol 3350 17 Gram(s) Oral daily  senna 2 Tablet(s) Oral at bedtime  sodium chloride 0.9%. 1000 milliLiter(s) (80 mL/Hr) IV Continuous <Continuous>    MEDICATIONS  (PRN):  ondansetron Injectable 4 milliGRAM(s) IV Push every 4 hours PRN Nausea and/or Vomiting  oxyCODONE    IR 2.5 milliGRAM(s) Oral every 4 hours PRN Moderate Pain (4 - 6)  oxyCODONE    IR 5 milliGRAM(s) Oral every 6 hours PRN Severe Pain (7 - 10)      PHYSICAL EXAM:  GENERAL: frail   EYES: clear conjunctiva  ENMT: Moist mucous membranes  NECK: Supple, No JVD  CHEST/LUNG: Clear to auscultation bilaterally; No rales, rhonchi, wheezing, or rubs  HEART: S1, S2, Regular rate and rhythm  ABDOMEN: Soft, Nontender, Nondistended; Bowel sounds present  NEURO: Alert & Oriented to person, place and situation, disoriented to time   EXTREMITIES: left hip with occlusive dsg, LUE with splint, edema LUE, intact sensation     Consultant(s) Notes Reviewed:  [x ] YES  [ ] NO  Care Discussed with Consultants/Other Providers [ x] YES  [ ] NO    LABS:                        9.2    25.52 )-----------( 369      ( 11 Feb 2022 06:40 )             28.5     02-11    141  |  109<H>  |  39<H>  ----------------------------<  93  3.9   |  26  |  1.06    Ca    8.3<L>      11 Feb 2022 06:40    TPro  5.0<L>  /  Alb  1.8<L>  /  TBili  0.8  /  DBili  x   /  AST  15  /  ALT  11  /  AlkPhos  65  02-10      CAPILLARY BLOOD GLUCOSE  RADIOLOGY & ADDITIONAL TESTS:    < from: CT Abdomen and Pelvis w/wo IV Cont (02.04.22 @ 15:00) >    ACC: [id number] EXAM:  CT ABDOMEN AND PELVIS WAW                           PROCEDURE DATE:  02/04/2022          INTERPRETATION:  CLINICAL INFORMATION: 89 years  Female with pelvic mass      Pelvic mass.    ADDITIONAL CLINICAL INFORMATION: Disorder of the urinary system N39.9    COMPARISON: 2/3/2022    CONTRAST/COMPLICATIONS:  IV Contrast: Omnipaque 350  90 cc administered   10 cc discarded  Oral Contrast: NONE  Complications: None reported at time of study completion    PROCEDURE:  CT of the Abdomen and Pelvis was performed.  Precontrast, Nephrographic and Excretory phases were acquired (CT   UROGRAM).    Sagittal and coronal reformats were performed.    FINDINGS:  LOWER CHEST: Bilateral lower lobe dependent atelectasis.    LIVER: Within normal limits.  BILE DUCTS: Normal caliber.  GALLBLADDER: Vicarious excretion of contrast in the gallbladder  SPLEEN: 5.6 x 4.0 cm rim calcified cyst.  PANCREAS: Within normal limits.  ADRENALS: Within normal limits.  KIDNEYS/URETERS: 2.7 cm left midpole cyst. 1.7 cm right lower pole cyst.   Bilateral hypodensities too small to characterize. No   hydroureteronephrosis. Symmetrical nephrograms.    BLADDER: Collapsed around Verduzco catheter. 10.7 x 9.7 x 10.0 cm complex   cystic pelvic mass with areas of internal soft tissue. Intraluminal   contrast extends around the medial margin of the mass suggesting bladder   etiology.  REPRODUCTIVE ORGANS: Uterus not visualized.    BOWEL: No bowel obstruction. Appendix is not visualized and cannot be   assessed. Sigmoid diverticulosis without diverticulitis. Retained fecal   matter throughout the colon.  PERITONEUM: Small pelvic ascites.  VESSELS: Atherosclerotic changes.  RETROPERITONEUM/LYMPH NODES: No lymphadenopathy.  ABDOMINAL WALL: 4.0 x 1.6 cm rightabdominal wall subcutaneous cyst.   Increasing subcutaneous edema overlying the left hip.  BONES: Comminuted angulated left intertrochanteric femoral neck fracture.   Avulsion of the lesser trochanter. Chronic healed left superior and   inferior pubic rami fracture. Chronic right sacral deformity. Severe T12   compression fracture. Moderate L2 compression fracture. Osteoporosis.    IMPRESSION:  Reidentified complex cystic pelvic mass effaces the bladder lumen.   Suspect bladder etiology. However,ovarian or uterine etiology cannot be   entirely excluded.    Comminuted angulated displaced left hip fracture with increasing   overlying subcutaneous edema.    < end of copied text >      Imaging Personally Reviewed:  [ ] YES  [ ] NO   Patient is a 89y old  Female who presents with a chief complaint of Left elbow and hip fracture (10 Feb 2022 14:50)    OVERNIGHT EVENTS: no acute events overnight, asking to go to rehab     REVIEW OF SYSTEMS:  CONSTITUTIONAL: No fever, chills  RESPIRATORY: No cough, SOB  CARDIOVASCULAR: No chest pain, palpitations  GASTROINTESTINAL: No abdominal pain. No nausea, vomiting, or diarrhea  GENITOURINARY: No dysuria  NEUROLOGICAL: No HA  MUSCULOSKELETAL: left arm and hip pain         T(C): 36.4 (02-11-22 @ 05:35), Max: 37.3 (02-10-22 @ 21:13)  HR: 80 (02-11-22 @ 05:35) (78 - 95)  BP: 143/91 (02-11-22 @ 05:35) (129/97 - 143/91)  RR: 18 (02-11-22 @ 05:35) (18 - 18)  SpO2: 99% (02-11-22 @ 05:35) (96% - 99%)  Wt(kg): --Vital Signs Last 24 Hrs  T(C): 36.4 (11 Feb 2022 05:35), Max: 37.3 (10 Feb 2022 21:13)  T(F): 97.6 (11 Feb 2022 05:35), Max: 99.2 (10 Feb 2022 21:13)  HR: 80 (11 Feb 2022 05:35) (78 - 95)  BP: 143/91 (11 Feb 2022 05:35) (129/97 - 143/91)  BP(mean): --  RR: 18 (11 Feb 2022 05:35) (18 - 18)  SpO2: 99% (11 Feb 2022 05:35) (96% - 99%)    MEDICATIONS  (STANDING):  acetaminophen     Tablet .. 1000 milliGRAM(s) Oral every 8 hours  enoxaparin Injectable 40 milliGRAM(s) SubCutaneous every 24 hours  ferrous    sulfate 325 milliGRAM(s) Oral <User Schedule>  oxybutynin 10 milliGRAM(s) Oral two times a day  pantoprazole    Tablet 40 milliGRAM(s) Oral before breakfast  polyethylene glycol 3350 17 Gram(s) Oral daily  senna 2 Tablet(s) Oral at bedtime  sodium chloride 0.9%. 1000 milliLiter(s) (80 mL/Hr) IV Continuous <Continuous>    MEDICATIONS  (PRN):  ondansetron Injectable 4 milliGRAM(s) IV Push every 4 hours PRN Nausea and/or Vomiting  oxyCODONE    IR 2.5 milliGRAM(s) Oral every 4 hours PRN Moderate Pain (4 - 6)  oxyCODONE    IR 5 milliGRAM(s) Oral every 6 hours PRN Severe Pain (7 - 10)      PHYSICAL EXAM:  GENERAL: frail   EYES: clear conjunctiva  ENMT: Moist mucous membranes  NECK: Supple, No JVD  CHEST/LUNG: Clear to auscultation bilaterally; No rales, rhonchi, wheezing, or rubs  HEART: S1, S2, Regular rate and rhythm  ABDOMEN: Soft, Nontender, distended; Bowel sounds hyperactive  NEURO: Alert & Oriented to person, place and situation, disoriented to time   EXTREMITIES: left hip with occlusive dsg, LUE with splint, edema LUE, intact sensation     Consultant(s) Notes Reviewed:  [x ] YES  [ ] NO  Care Discussed with Consultants/Other Providers [ x] YES  [ ] NO    LABS:                        9.2    25.52 )-----------( 369      ( 11 Feb 2022 06:40 )             28.5     02-11    141  |  109<H>  |  39<H>  ----------------------------<  93  3.9   |  26  |  1.06    Ca    8.3<L>      11 Feb 2022 06:40    TPro  5.0<L>  /  Alb  1.8<L>  /  TBili  0.8  /  DBili  x   /  AST  15  /  ALT  11  /  AlkPhos  65  02-10      CAPILLARY BLOOD GLUCOSE  RADIOLOGY & ADDITIONAL TESTS:    < from: CT Abdomen and Pelvis w/wo IV Cont (02.04.22 @ 15:00) >    ACC: [id number] EXAM:  CT ABDOMEN AND PELVIS WAW                           PROCEDURE DATE:  02/04/2022          INTERPRETATION:  CLINICAL INFORMATION: 89 years  Female with pelvic mass      Pelvic mass.    ADDITIONAL CLINICAL INFORMATION: Disorder of the urinary system N39.9    COMPARISON: 2/3/2022    CONTRAST/COMPLICATIONS:  IV Contrast: Omnipaque 350  90 cc administered   10 cc discarded  Oral Contrast: NONE  Complications: None reported at time of study completion    PROCEDURE:  CT of the Abdomen and Pelvis was performed.  Precontrast, Nephrographic and Excretory phases were acquired (CT   UROGRAM).    Sagittal and coronal reformats were performed.    FINDINGS:  LOWER CHEST: Bilateral lower lobe dependent atelectasis.    LIVER: Within normal limits.  BILE DUCTS: Normal caliber.  GALLBLADDER: Vicarious excretion of contrast in the gallbladder  SPLEEN: 5.6 x 4.0 cm rim calcified cyst.  PANCREAS: Within normal limits.  ADRENALS: Within normal limits.  KIDNEYS/URETERS: 2.7 cm left midpole cyst. 1.7 cm right lower pole cyst.   Bilateral hypodensities too small to characterize. No   hydroureteronephrosis. Symmetrical nephrograms.    BLADDER: Collapsed around Verduzco catheter. 10.7 x 9.7 x 10.0 cm complex   cystic pelvic mass with areas of internal soft tissue. Intraluminal   contrast extends around the medial margin of the mass suggesting bladder   etiology.  REPRODUCTIVE ORGANS: Uterus not visualized.    BOWEL: No bowel obstruction. Appendix is not visualized and cannot be   assessed. Sigmoid diverticulosis without diverticulitis. Retained fecal   matter throughout the colon.  PERITONEUM: Small pelvic ascites.  VESSELS: Atherosclerotic changes.  RETROPERITONEUM/LYMPH NODES: No lymphadenopathy.  ABDOMINAL WALL: 4.0 x 1.6 cm rightabdominal wall subcutaneous cyst.   Increasing subcutaneous edema overlying the left hip.  BONES: Comminuted angulated left intertrochanteric femoral neck fracture.   Avulsion of the lesser trochanter. Chronic healed left superior and   inferior pubic rami fracture. Chronic right sacral deformity. Severe T12   compression fracture. Moderate L2 compression fracture. Osteoporosis.    IMPRESSION:  Reidentified complex cystic pelvic mass effaces the bladder lumen.   Suspect bladder etiology. However,ovarian or uterine etiology cannot be   entirely excluded.    Comminuted angulated displaced left hip fracture with increasing   overlying subcutaneous edema.    < end of copied text >      Imaging Personally Reviewed:  [ ] YES  [ ] NO

## 2022-02-11 NOTE — PROGRESS NOTE ADULT - SUBJECTIVE AND OBJECTIVE BOX
Source of information: , Chart review  Patient language: English  : n/a    CC:  S/p L Hip IM Nailing POD#5 and ORIF of Left Olecranon Fracture POD#3    This is a 89yFemale patient who presents to the hospital for Patient is a 89y old  Female who presents with a chief complaint of Left elbow and hip fracture (11 Feb 2022 10:57)    Pt s/p left hip im nail pd#5.  + left hip pain  + left elbow pain.  Pt is lying in bed nad.  + serosang drainage left hip.  Dressing changed by ortho.  No nausea or vomitng.  Pending placement at [facility].       PAIN SCORE:     4/10    SCALE USED: (1-10 NRS)      PAST MEDICAL & SURGICAL HISTORY:  Urinary frequency    Hypertension        FAMILY HISTORY:        SOCIAL HISTORY:  [x ] Denies Smoking, Alcohol, or Drug Use    Allergies    No Known Allergies    Intolerances        MEDICATIONS:    MEDICATIONS  (STANDING):  acetaminophen     Tablet .. 1000 milliGRAM(s) Oral every 8 hours  bisacodyl 10 milliGRAM(s) Oral once  ceFAZolin   IVPB      ceFAZolin   IVPB 2000 milliGRAM(s) IV Intermittent every 8 hours  enoxaparin Injectable 40 milliGRAM(s) SubCutaneous every 24 hours  ferrous    sulfate 325 milliGRAM(s) Oral <User Schedule>  oxybutynin 10 milliGRAM(s) Oral two times a day  pantoprazole    Tablet 40 milliGRAM(s) Oral before breakfast  polyethylene glycol 3350 17 Gram(s) Oral daily  senna 2 Tablet(s) Oral at bedtime  sodium chloride 0.9%. 1000 milliLiter(s) (80 mL/Hr) IV Continuous <Continuous>    MEDICATIONS  (PRN):  ondansetron Injectable 4 milliGRAM(s) IV Push every 4 hours PRN Nausea and/or Vomiting  oxyCODONE    IR 2.5 milliGRAM(s) Oral every 4 hours PRN Moderate Pain (4 - 6)  oxyCODONE    IR 5 milliGRAM(s) Oral every 6 hours PRN Severe Pain (7 - 10)      Vital Signs Last 24 Hrs  T(C): 36.4 (11 Feb 2022 05:35), Max: 37.3 (10 Feb 2022 21:13)  T(F): 97.6 (11 Feb 2022 05:35), Max: 99.2 (10 Feb 2022 21:13)  HR: 83 (11 Feb 2022 11:58) (78 - 95)  BP: 128/80 (11 Feb 2022 11:58) (128/80 - 143/91)  BP(mean): --  RR: 18 (11 Feb 2022 05:35) (18 - 18)  SpO2: 98% (11 Feb 2022 11:58) (96% - 99%)    LABS:                          9.2    25.52 )-----------( 369      ( 11 Feb 2022 06:40 )             28.5     02-11    141  |  109<H>  |  39<H>  ----------------------------<  93  3.9   |  26  |  1.06    Ca    8.3<L>      11 Feb 2022 06:40    TPro  5.0<L>  /  Alb  1.8<L>  /  TBili  0.8  /  DBili  x   /  AST  15  /  ALT  11  /  AlkPhos  65  02-10      LIVER FUNCTIONS - ( 10 Feb 2022 07:01 )  Alb: 1.8 g/dL / Pro: 5.0 g/dL / ALK PHOS: 65 U/L / ALT: 11 U/L DA / AST: 15 U/L / GGT: x             CAPILLARY BLOOD GLUCOSE          Radiology:    Drug Screen:        REVIEW OF SYSTEMS:  CONSTITUTIONAL: No fever or fatigue  RESPIRATORY: No cough, wheezing, chills or hemoptysis; No shortness of breath  CARDIOVASCULAR: No chest pain, palpitations, dizziness, or leg swelling  GASTROINTESTINAL: No abdominal or epigastric pain. No nausea, vomiting; No diarrhea or constipation.   GENITOURINARY: No dysuria, frequency, hematuria, retention or incontinence  MUSCULOSKELETAL: + left hip pain   + left elbow pain  NEURO: No headaches, No numbness/tingling b/l LE, No weakness  PSYCHIATRIC: No depression, anxiety, mood swings, or difficulty sleeping    PHYSICAL EXAM:  GENERAL:  Alert & Oriented X3, NAD, Good concentration  CHEST/LUNG: Clear to auscultation bilaterally; No rales, rhonchi, wheezing, or rubs  HEART: Regular rate and rhythm; No murmurs, rubs, or gallops  ABDOMEN: Soft, Nontender, Nondistended; Bowel sounds present  EXTREMITIES:  2+ Peripheral Pulses, No cyanosis, or edema  MUSCULOSKELETAL: + decreased rom + left hip tenderness + left elbow in soft cast   SKIN: No rashes or lesions    Risk factors associated with adverse outcomes related to opioid treatment  [ ]  Concurrent benzodiazepine use  [ ]  History/ Active substance use or alcohol use disorder  [ ] Psychiatric co-morbidity  [ ] Sleep apnea  [ ] COPD  [ ] BMI> 35  [ ] Liver dysfunction  [ ] Renal dysfunction  [ ] CHF  [ ] Smoker  [ x]  Age > 60 years    [x ]  [place]  Reviewed and Copied to Chart. See below.    Plan of care and goal oriented pain management treatment options were discussed with patient and /or primary care giver; all questions and concerns were addressed and care was aligned with patient's wishes.    Educated patient on goal oriented pain management treatment options     02-11-22 @ 12:33

## 2022-02-11 NOTE — PROGRESS NOTE ADULT - ASSESSMENT
89yo Female lives alone at home, independent, ambulates sometimes with a rolator, baseline AAOx2-3 with PMH of Urinary incontinence, HTN and PSH of left mastectomy (left breast CA 30 yrs ago.  Presented to the ED after fall.  Admitted for left  olecranon fracture & left hip fracture. Ortho consulted   Pt had jalloh placed in ED and was found to have gross hematuria, CT abd and pelvis showed large pelvic mass suspicious for malignancy, bone scan was done and did not show any osseous metastasis, Heme/onc on board and was recommended for cystoscopy but pt refused cysto at this time.  Pt was given 1 PRBC for optimization prior to OR , S/p L hip IM nailing on 2/6, Patient s/p OR 2/8  for ORIF of left olecranon fracture.  PT evaluated and recommended [facility], pending ins auth for [facility] placement

## 2022-02-11 NOTE — DIETITIAN INITIAL EVALUATION ADULT. - OTHER INFO
S/p left hip nailing 2/6, s/p ORIF, s/p left olecranon fx 2/ 8. Noted to have pelvic mass. Pt able to make needs known. Numerous adjustments to pt's menu over her stay. Pt does not weigh self, asked to guesstimate she reports "125" . She reports she has some chew/ swallow issues, but reports the food is cut up for her and she is OK. NKFA. Pt declined Ensure stating it is for people on their last laps.

## 2022-02-11 NOTE — PROGRESS NOTE ADULT - PROBLEM SELECTOR PLAN 9
-medically optimized for discharge to [facility], pending ins auth for placement to [facility]   -Pt's HCP is Trinidad Kahn [phone] who is providing [facility] choices and assisting with plan of care   -CM following -medically optimized for discharge to [facility], pending ins auth for placement to [facility]   -Pt's HCP is Trinidad Kahn [phone] who is providing [facility] choices and assisting with plan of care, spoke to HCP this AM, updated on clinical status, treatment plan/options and discharge plan/options, all questions answered   -CM following - pending ins auth for placement to [facility]   -Pt's HCP is Trinidad Kahn [phone] who is providing [facility] choices and assisting with plan of care, spoke to HCP this AM, updated on clinical status, treatment plan/options and discharge plan/options, all questions answered   -CM following

## 2022-02-11 NOTE — PROGRESS NOTE ADULT - SUBJECTIVE AND OBJECTIVE BOX
89yFemale    Diagnosis:  S/p L Hip IM Nailing POD#5 and ORIF of Left Olecranon Fracture POD#3    Patient was seen and evaluated at bedside. Patient with no acute complaints.   Pain is well controlled.   Denies CP/SOB, dyspnea, paresthesias, N/V/D, palpitations.     Vital Signs Last 24 Hrs  T(C): 36.4 (02-11-22 @ 05:35), Max: 37.3 (02-10-22 @ 21:13)  T(F): 97.6 (02-11-22 @ 05:35), Max: 99.2 (02-10-22 @ 21:13)  HR: 80 (02-11-22 @ 05:35) (78 - 95)  BP: 143/91 (02-11-22 @ 05:35) (129/97 - 143/91)  BP(mean): --  RR: 18 (02-11-22 @ 05:35) (18 - 18)  SpO2: 99% (02-11-22 @ 05:35) (96% - 99%)              Physical Exam:    General: NAD, resting comfortably in bed.    Left UE: Splint intact at left arm. NVI. Left fingers with swelling, position adjusted on LUE on pillow to decrease swelling. ROM of fingers. Sensation intact.   Left Hip:  Drainage elicited from the proximal incision. middle incision is not draining. Proximal dressing soaked with serosanguineous staining on gauze. dressing changed to compressive hip spica bandage.   Skin is pink and warm. Staples intact. No erythema. SILT.   Lower extremity:  No calf tenderness, calves are soft. 2+pulses. NVI. EHL/TA/gastrocnemius intact B/L.  Good capillary refill. Warm, well-perfused.                                                            9.2    25.52 )-----------( 369      ( 11 Feb 2022 06:40 )             28.5   02-11    141  |  109<H>  |  39<H>  ----------------------------<  93  3.9   |  26  |  1.06    Ca    8.3<L>      11 Feb 2022 06:40    TPro  5.0<L>  /  Alb  1.8<L>  /  TBili  0.8  /  DBili  x   /  AST  15  /  ALT  11  /  AlkPhos  65  02-10          Impression:  89yFemale  S/p L Hip IM Nailing POD#5 and ORIF of Left Olecranon Fracture POD#3  Plan:  -  Pain management  -  Dvt prophylaxis with Lovenox  -  Daily Physical Theapy:  WBAT of left lower extremity and NWB of LUE  -  Keep LUE elevated where hand above elbow to decrease swelling at fingers  -  discharge planning [facility]

## 2022-02-11 NOTE — DIETITIAN INITIAL EVALUATION ADULT. - PERTINENT LABORATORY DATA
02-11 Na141 mmol/L Glu 93 mg/dL K+ 3.9 mmol/L Cr  1.06 mg/dL BUN 39 mg/dL<H>     02-10 Alb 1.8 g/dL<L>     02-03 Chol 100 mg/dL LDL --    HDL 48 mg/dL<L> Trig 60 mg/dL    02-03-22 @ 10:34 HgbA1C 5.4

## 2022-02-12 NOTE — PROGRESS NOTE ADULT - PROBLEM SELECTOR PLAN 2
-abdomen distended, AXR with air-fluid bowel loops  -recommend bowel rest to patient, but she is adamant on needing nutriton, will give liquid diet and monitor  -patient reports she is currently passing flatus  -encourage ambulation  -opioids discontinued, hold iron for now  -if develops nausea/vomiting would keep strictly NPO and may need NGT

## 2022-02-12 NOTE — PROGRESS NOTE ADULT - SUBJECTIVE AND OBJECTIVE BOX
89yFemale    Diagnosis:  S/p L Hip IM Nailing POD#6 and ORIF of Left Olecranon Fracture POD#4    Patient was seen and evaluated at bedside. Patient with no acute complaints.   Pain is well controlled.   Denies CP/SOB, dyspnea, paresthesias, N/V/D, palpitations.       Vital Signs Last 24 Hrs  T(C): 36.4 (02-12-22 @ 05:26), Max: 36.9 (02-11-22 @ 20:52)  T(F): 97.6 (02-12-22 @ 05:26), Max: 98.4 (02-11-22 @ 20:52)  HR: 74 (02-12-22 @ 05:26) (74 - 83)  BP: 126/67 (02-12-22 @ 05:26) (113/71 - 128/80)  BP(mean): --  RR: 17 (02-12-22 @ 05:26) (17 - 18)  SpO2: 94% (02-12-22 @ 05:26) (94% - 100%)                  Physical Exam:    General: NAD, resting comfortably in bed.    Left UE: Splint intact at left arm. NVI. Left fingers with swelling, position adjusted on LUE on pillow to decrease swelling. ROM of fingers. Sensation intact.   Left Hip:  Hip spica dressing in place - dressing is _______ Skin is pink and warm. Staples intact. No erythema. SILT.   Lower extremity:  No calf tenderness, calves are soft. 2+pulses. NVI. EHL/TA/gastrocnemius intact B/L.  Good capillary refill. Warm, well-perfused.                                               7.9    24.79 )-----------( 321      ( 12 Feb 2022 06:35 )             24.1   02-12    130<L>  |  100  |  35<H>  ----------------------------<  278<H>  3.9   |  23  |  2.84<H>    Ca    7.3<L>      12 Feb 2022 06:35          Impression:  89yFemale  S/p L Hip IM Nailing POD#6 and ORIF of Left Olecranon Fracture POD#4  Plan:  -  Pain management  -  Dvt prophylaxis with Lovenox  -  Daily Physical Theapy:  WBAT of left lower extremity and NWB of LUE  -  Keep LUE elevated where hand above elbow to decrease swelling at fingers  -  discharge planning [facility]      89yFemale    Diagnosis:  S/p L Hip IM Nailing POD#6 and ORIF of Left Olecranon Fracture POD#4    Patient was seen and evaluated at bedside. Patient with no acute complaints.   Pain is well controlled.   Denies CP/SOB, dyspnea, paresthesias, N/V/D, palpitations.       Vital Signs Last 24 Hrs  T(C): 36.4 (02-12-22 @ 05:26), Max: 36.9 (02-11-22 @ 20:52)  T(F): 97.6 (02-12-22 @ 05:26), Max: 98.4 (02-11-22 @ 20:52)  HR: 74 (02-12-22 @ 05:26) (74 - 83)  BP: 126/67 (02-12-22 @ 05:26) (113/71 - 128/80)  BP(mean): --  RR: 17 (02-12-22 @ 05:26) (17 - 18)  SpO2: 94% (02-12-22 @ 05:26) (94% - 100%)                  Physical Exam:    General: NAD, resting comfortably in bed.    Left UE: Splint intact at left arm. NVI. Left fingers with swelling, position adjusted on LUE on pillow to decrease swelling. ROM of fingers. Sensation intact.   Left Hip:  Hip spica dressing in place - dressing is slightly stained posteriorly, not saturated. Skin is pink and warm. Staples intact. No erythema. SILT.   Lower extremity:  No calf tenderness, calves are soft. 2+pulses. NVI. EHL/TA/gastrocnemius intact B/L.  Good capillary refill. Warm, well-perfused.                                               7.9    24.79 )-----------( 321      ( 12 Feb 2022 06:35 )             24.1   02-12    130<L>  |  100  |  35<H>  ----------------------------<  278<H>  3.9   |  23  |  2.84<H>    Ca    7.3<L>      12 Feb 2022 06:35          Impression:  89yFemale  S/p L Hip IM Nailing POD#6 and ORIF of Left Olecranon Fracture POD#4  Plan:  -  due to huge change in lab values after stable for several days, will get repeat labs  -  Pain management  - dressing change tomorrow.   -  Dvt prophylaxis with Lovenox  -  Daily Physical Theapy:  WBAT of left lower extremity and NWB of LUE  -  Keep LUE elevated where hand above elbow to decrease swelling at fingers  -  discharge planning [facility] when stable

## 2022-02-12 NOTE — PROGRESS NOTE ADULT - SUBJECTIVE AND OBJECTIVE BOX
S: Patient complains that she would like to eat. Patient states that she does not think she has a problem with her stomach.    O:  Vital Signs Last 24 Hrs  T(C): 36.9 (12 Feb 2022 22:10), Max: 36.9 (12 Feb 2022 22:10)  T(F): 98.5 (12 Feb 2022 22:10), Max: 98.5 (12 Feb 2022 22:10)  HR: 90 (12 Feb 2022 22:10) (74 - 95)  BP: 132/75 (12 Feb 2022 22:10) (123/71 - 132/75)  BP(mean): 86 (12 Feb 2022 22:10) (86 - 86)  RR: 18 (12 Feb 2022 22:10) (17 - 18)  SpO2: 100% (12 Feb 2022 22:10) (92% - 100%)    GENERAL: NAD, well-developed  HEAD:  Atraumatic, Normocephalic  EYES: EOMI, PERRLA, conjunctiva and sclera clear  NECK: Supple, No JVD  CHEST/LUNG: Clear to auscultation bilaterally; No wheeze  HEART: Regular rate and rhythm; No murmurs, rubs, or gallops  ABDOMEN: Soft, Nontender, Disended; Bowel sounds present  EXTREMITIES:  2+ Peripheral Pulses, No clubbing, cyanosis, or edema. Left arm in sling and cast  PSYCH: AAOx3  NEUROLOGY: non-focal  SKIN: No rashes or lesions    bisacodyl 5 milliGRAM(s) Oral every 12 hours  budesonide  80 MICROgram(s)/formoterol 4.5 MICROgram(s) Inhaler 2 Puff(s) Inhalation two times a day  ceFAZolin   IVPB      ceFAZolin   IVPB 2000 milliGRAM(s) IV Intermittent every 8 hours  enoxaparin Injectable 40 milliGRAM(s) SubCutaneous every 24 hours  ferrous    sulfate 325 milliGRAM(s) Oral <User Schedule>  magnesium hydroxide Suspension 30 milliLiter(s) Oral daily  ondansetron Injectable 4 milliGRAM(s) IV Push every 4 hours PRN  oxybutynin 10 milliGRAM(s) Oral two times a day  pantoprazole    Tablet 40 milliGRAM(s) Oral before breakfast  polyethylene glycol 3350 17 Gram(s) Oral daily  saline laxative (FLEET) Rectal Enema 1 Enema Rectal once PRN  senna 2 Tablet(s) Oral at bedtime  sodium chloride 0.9%. 1000 milliLiter(s) IV Continuous <Continuous>                            10.0   32.43 )-----------( 463      ( 12 Feb 2022 11:59 )             30.0       02-12    138  |  106  |  36<H>  ----------------------------<  98  4.3   |  26  |  1.07    Ca    8.3<L>      12 Feb 2022 11:59    TPro  5.3<L>  /  Alb  1.7<L>  /  TBili  0.7  /  DBili  x   /  AST  15  /  ALT  10  /  AlkPhos  78  02-12

## 2022-02-12 NOTE — PROGRESS NOTE ADULT - ASSESSMENT
89yo Female lives alone at home, independent, ambulates sometimes with a rolator, baseline AAOx2-3 with PMH of Urinary incontinence, HTN and PSH of left mastectomy (left breast CA 30 yrs ago.  Presented to the ED after fall.  Admitted for left  olecranon fracture & left hip fracture. Ortho consulted   Pt had jalloh placed in ED and was found to have gross hematuria, CT abd and pelvis showed large pelvic mass suspicious for malignancy, bone scan was done and did not show any osseous metastasis, Heme/onc on board and was recommended for cystoscopy but pt refused cysto at this time.  Pt was given 1 PRBC for optimization prior to OR , S/p L hip IM nailing on 2/6, Patient s/p OR 2/8  for ORIF of left olecranon fracture.  Patient has post-operative ileus

## 2022-02-12 NOTE — CHART NOTE - NSCHARTNOTEFT_GEN_A_CORE
EVENT: follow up on blood work from this AM     OBJECTIVE:  Vital Signs Last 24 Hrs  T(C): 36.4 (12 Feb 2022 05:26), Max: 36.9 (11 Feb 2022 20:52)  T(F): 97.6 (12 Feb 2022 05:26), Max: 98.4 (11 Feb 2022 20:52)  HR: 74 (12 Feb 2022 05:26) (74 - 80)  BP: 126/67 (12 Feb 2022 05:26) (113/71 - 126/67)  BP(mean): --  RR: 17 (12 Feb 2022 05:26) (17 - 18)  SpO2: 94% (12 Feb 2022 05:26) (94% - 96%)    REVIEW OF SYSTEMS:  RESPIRATORY: No cough, SOB  CARDIOVASCULAR: No chest pain, palpitations  GASTROINTESTINAL: No abdominal pain. No nausea, vomiting, or diarrhea  GENITOURINARY: No dysuria  NEUROLOGICAL: No HA  MUSCULOSKELETAL: left arm and hip pain     PHYSICAL EXAM:  GENERAL: frail   EYES: clear conjunctiva  ENMT: Moist mucous membranes  NECK: Supple, No JVD  CHEST/LUNG: Clear to auscultation bilaterally; No rales, rhonchi, wheezing, or rubs  HEART: S1, S2, Regular rate and rhythm  ABDOMEN: Soft, Nontender, distended; Bowel sounds hyperactive  NEURO: Alert & Oriented to person, place and situation, disoriented to time   EXTREMITIES: left hip with occlusive dsg, LUE with splint, edema LUE, intact sensation     LABS:                        10.0   32.43 )-----------( 463      ( 12 Feb 2022 11:59 )             30.0     02-12    138  |  106  |  36<H>  ----------------------------<  98  4.3   |  26  |  1.07    Ca    8.3<L>      12 Feb 2022 11:59    TPro  5.3<L>  /  Alb  1.7<L>  /  TBili  0.7  /  DBili  x   /  AST  15  /  ALT  10  /  AlkPhos  78  02-12    < from: Xray Abdomen 1 View PORTABLE -Routine (Xray Abdomen 1 View PORTABLE -Routine .) (02.11.22 @ 12:31) >      ACC: [id number] EXAM:  XR ABDOMEN PORTABLE ROUTINE 1V                          PROCEDURE DATE:  02/11/2022          INTERPRETATION:  Abdomen one view    HISTORY: Abdominal distention    COMPARISON: 2/4/2022    Frontal view of the abdomen shows air filling of small and large bowel   loops compatible with ileus in the proper clinical setting. There is no   evidence of organomegaly. Left hip hardware is present.    IMPRESSION:  Compatible with ileus.    Thank you for this referral.    < end of copied text >        A/P: 89 yo F lives alone at home, independent, ambulates sometimes with a rolator, baseline AAOx2-3 with PMH of Urinary incontinence, HTN and PSH of left mastectomy (left breast CA 30 yrs ago) presents to the ED after a fall     Anemia:   -this AM blood work noted ? lab error, repeat blood work around pt's baseline   -f/u repeat labs in AM     Constipation:   -pt with no BM since prior to surgery  -abd Xray suspicious for ileus, pt asymptomatic   -change diet to full liquids, consider NPO and/or NGT if pt becomes symptomatic   -pt has been non compliant with bowel regimen and refused enemas and suppositories   -cont Senna, Miralax, will add Dulcolax    -out of bed to chair, encourage ambulation with PT EVENT: follow up on blood work from this AM     OBJECTIVE:  Vital Signs Last 24 Hrs  T(C): 36.4 (12 Feb 2022 05:26), Max: 36.9 (11 Feb 2022 20:52)  T(F): 97.6 (12 Feb 2022 05:26), Max: 98.4 (11 Feb 2022 20:52)  HR: 74 (12 Feb 2022 05:26) (74 - 80)  BP: 126/67 (12 Feb 2022 05:26) (113/71 - 126/67)  BP(mean): --  RR: 17 (12 Feb 2022 05:26) (17 - 18)  SpO2: 94% (12 Feb 2022 05:26) (94% - 96%)    REVIEW OF SYSTEMS:  RESPIRATORY: No cough, SOB  CARDIOVASCULAR: No chest pain, palpitations  GASTROINTESTINAL: No abdominal pain. No nausea, vomiting, or diarrhea  GENITOURINARY: No dysuria  NEUROLOGICAL: No HA  MUSCULOSKELETAL: left arm and hip pain     PHYSICAL EXAM:  GENERAL: frail   EYES: clear conjunctiva  ENMT: Moist mucous membranes  NECK: Supple, No JVD  CHEST/LUNG: Clear to auscultation bilaterally; No rales, rhonchi, wheezing, or rubs  HEART: S1, S2, Regular rate and rhythm  ABDOMEN: Soft, Nontender, distended; Bowel sounds hyperactive  NEURO: Alert & Oriented to person, place and situation, disoriented to time   EXTREMITIES: left hip with occlusive dsg, LUE with splint, edema LUE, intact sensation     LABS:                        10.0   32.43 )-----------( 463      ( 12 Feb 2022 11:59 )             30.0     02-12    138  |  106  |  36<H>  ----------------------------<  98  4.3   |  26  |  1.07    Ca    8.3<L>      12 Feb 2022 11:59    TPro  5.3<L>  /  Alb  1.7<L>  /  TBili  0.7  /  DBili  x   /  AST  15  /  ALT  10  /  AlkPhos  78  02-12    < from: Xray Abdomen 1 View PORTABLE -Routine (Xray Abdomen 1 View PORTABLE -Routine .) (02.11.22 @ 12:31) >      ACC: [id number] EXAM:  XR ABDOMEN PORTABLE ROUTINE 1V                          PROCEDURE DATE:  02/11/2022          INTERPRETATION:  Abdomen one view    HISTORY: Abdominal distention    COMPARISON: 2/4/2022    Frontal view of the abdomen shows air filling of small and large bowel   loops compatible with ileus in the proper clinical setting. There is no   evidence of organomegaly. Left hip hardware is present.    IMPRESSION:  Compatible with ileus.    Thank you for this referral.    < end of copied text >        A/P: 89 yo F lives alone at home, independent, ambulates sometimes with a rolator, baseline AAOx2-3 with PMH of Urinary incontinence, HTN and PSH of left mastectomy (left breast CA 30 yrs ago) presents to the ED after a fall     Anemia:   -this AM blood work noted ? lab error, repeat blood work around pt's baseline   -f/u repeat labs in AM     Constipation:   -pt with no BM since prior to surgery  -abd Xray suspicious for ileus, pt asymptomatic   -change diet to full liquids, consider NPO and/or NGT if pt becomes symptomatic   -pt has been non compliant with bowel regimen and refused enemas and suppositories   -cont Senna, Miralax, will add Dulcolax    -out of bed to chair, encourage ambulation with PT      Above d/w Dr. Enciso

## 2022-02-13 NOTE — PROGRESS NOTE ADULT - SUBJECTIVE AND OBJECTIVE BOX
S:    O:  Vital Signs Last 24 Hrs  T(C): 36.6 (13 Feb 2022 06:16), Max: 36.9 (12 Feb 2022 22:10)  T(F): 97.9 (13 Feb 2022 06:16), Max: 98.5 (12 Feb 2022 22:10)  HR: 88 (13 Feb 2022 11:02) (81 - 95)  BP: 132/61 (13 Feb 2022 11:02) (123/71 - 144/75)  BP(mean): 86 (12 Feb 2022 22:10) (86 - 86)  RR: 18 (13 Feb 2022 06:16) (18 - 18)  SpO2: 97% (13 Feb 2022 11:02) (94% - 100%)    GENERAL: NAD, well-developed  HEAD:  Atraumatic, Normocephalic  EYES: EOMI, PERRLA, conjunctiva and sclera clear  NECK: Supple, No JVD  CHEST/LUNG: Clear to auscultation bilaterally; No wheeze  HEART: Regular rate and rhythm; No murmurs, rubs, or gallops  ABDOMEN: Soft, Nontender, Nondistended; Bowel sounds present  EXTREMITIES:  2+ Peripheral Pulses, No clubbing, cyanosis, or edema  PSYCH: AAOx3  NEUROLOGY: non-focal  SKIN: No rashes or lesions    cephalexin 500 milliGRAM(s) Oral four times a day  enoxaparin Injectable 40 milliGRAM(s) SubCutaneous every 24 hours  magnesium hydroxide Suspension 30 milliLiter(s) Oral daily PRN  ondansetron Injectable 4 milliGRAM(s) IV Push every 4 hours PRN  oxybutynin 10 milliGRAM(s) Oral two times a day  pantoprazole    Tablet 40 milliGRAM(s) Oral before breakfast  polyethylene glycol 3350 17 Gram(s) Oral daily  senna 2 Tablet(s) Oral at bedtime  sodium chloride 0.9%. 1000 milliLiter(s) IV Continuous <Continuous>                            9.6    28.62 )-----------( 456      ( 13 Feb 2022 07:18 )             29.5       02-13    136  |  110<H>  |  33<H>  ----------------------------<  105<H>  4.2   |  25  |  1.09    Ca    8.1<L>      13 Feb 2022 07:18  Mg     2.5     02-13    TPro  5.1<L>  /  Alb  1.7<L>  /  TBili  0.7  /  DBili  x   /  AST  12  /  ALT  8<L>  /  AlkPhos  79  02-13   S: Patient complaining about liquid diet. Denies nausea or vomiting, reports passing gas but no bowel movements.    O:  Vital Signs Last 24 Hrs  T(C): 36.6 (13 Feb 2022 06:16), Max: 36.9 (12 Feb 2022 22:10)  T(F): 97.9 (13 Feb 2022 06:16), Max: 98.5 (12 Feb 2022 22:10)  HR: 88 (13 Feb 2022 11:02) (81 - 95)  BP: 132/61 (13 Feb 2022 11:02) (123/71 - 144/75)  BP(mean): 86 (12 Feb 2022 22:10) (86 - 86)  RR: 18 (13 Feb 2022 06:16) (18 - 18)  SpO2: 97% (13 Feb 2022 11:02) (94% - 100%)    GENERAL: NAD, well-developed  HEAD:  Atraumatic, Normocephalic  EYES: EOMI, PERRLA, conjunctiva and sclera clear  NECK: Supple, No JVD  CHEST/LUNG: Clear to auscultation bilaterally; No wheeze  HEART: Regular rate and rhythm; No murmurs, rubs, or gallops  ABDOMEN: Soft, Nontender, distended; Bowel sounds present  EXTREMITIES:  2+ Peripheral Pulses, No clubbing, cyanosis, or edema  PSYCH: AAOx3  NEUROLOGY: non-focal  SKIN: No rashes or lesions    cephalexin 500 milliGRAM(s) Oral four times a day  enoxaparin Injectable 40 milliGRAM(s) SubCutaneous every 24 hours  magnesium hydroxide Suspension 30 milliLiter(s) Oral daily PRN  ondansetron Injectable 4 milliGRAM(s) IV Push every 4 hours PRN  oxybutynin 10 milliGRAM(s) Oral two times a day  pantoprazole    Tablet 40 milliGRAM(s) Oral before breakfast  polyethylene glycol 3350 17 Gram(s) Oral daily  senna 2 Tablet(s) Oral at bedtime  sodium chloride 0.9%. 1000 milliLiter(s) IV Continuous <Continuous>                            9.6    28.62 )-----------( 456      ( 13 Feb 2022 07:18 )             29.5       02-13    136  |  110<H>  |  33<H>  ----------------------------<  105<H>  4.2   |  25  |  1.09    Ca    8.1<L>      13 Feb 2022 07:18  Mg     2.5     02-13    TPro  5.1<L>  /  Alb  1.7<L>  /  TBili  0.7  /  DBili  x   /  AST  12  /  ALT  8<L>  /  AlkPhos  79  02-13

## 2022-02-13 NOTE — PROGRESS NOTE ADULT - SUBJECTIVE AND OBJECTIVE BOX
89yFemale    Diagnosis:  S/p L Hip IM Nailing POD#7 and ORIF of Left Olecranon Fracture POD#5    Patient was seen and evaluated at bedside. Patient with no acute complaints.   Pain is well controlled.   Denies CP/SOB, dyspnea, paresthesias, N/V/D, palpitations.     Vital Signs Last 24 Hrs  T(C): 36.6 (02-13-22 @ 06:16), Max: 36.9 (02-12-22 @ 22:10)  T(F): 97.9 (02-13-22 @ 06:16), Max: 98.5 (02-12-22 @ 22:10)  HR: 81 (02-13-22 @ 06:16) (76 - 95)  BP: 144/74 (02-13-22 @ 06:16) (123/71 - 144/74)  BP(mean): 86 (02-12-22 @ 22:10) (86 - 86)  RR: 18 (02-13-22 @ 06:16) (18 - 18)  SpO2: 96% (02-13-22 @ 06:16) (92% - 100%)          Physical Exam:    General: NAD, resting comfortably in bed.    Left UE: Splint intact at left arm. NVI. Left fingers with swelling, position adjusted on LUE on pillow to decrease swelling. ROM of fingers. Sensation intact.   Left Hip:  Hip spica dressing in place - dressing has staining that is dried up. no drainage elicited from the incision. new dressing placed. Skin is pink and warm. Staples intact. No erythema. SILT.   Lower extremity:  No calf tenderness, calves are soft. 2+pulses. NVI. EHL/TA/gastrocnemius intact B/L.  Good capillary refill. Warm, well-perfused.                                                          9.6    28.62 )-----------( 456      ( 13 Feb 2022 07:18 )             29.5   02-13    136  |  110<H>  |  33<H>  ----------------------------<  105<H>  4.2   |  25  |  1.09    Ca    8.1<L>      13 Feb 2022 07:18  Mg     2.5     02-13    TPro  5.1<L>  /  Alb  1.7<L>  /  TBili  0.7  /  DBili  x   /  AST  12  /  ALT  8<L>  /  AlkPhos  79  02-13          Impression:  89yFemale  S/p L Hip IM Nailing POD#7 and ORIF of Left Olecranon Fracture POD#5  Plan:  -  labs from yesterday am were likely an error - today and yesterday's pm labs were stable.   -  Pain management  - dressing change tomorrow   -  Dvt prophylaxis with Lovenox  -  Daily Physical Theapy:  WBAT of left lower extremity and NWB of LUE  -  Keep LUE elevated where hand above elbow to decrease swelling at fingers  -  discharge planning [facility]

## 2022-02-13 NOTE — PROGRESS NOTE ADULT - ASSESSMENT
89yo Female lives alone at home, independent, ambulates sometimes with a rolator, baseline AAOx2-3 with PMH of Urinary incontinence, HTN and PSH of left mastectomy (left breast CA 30 yrs ago.  Presented to the ED after fall.  Admitted for left  olecranon fracture & left hip fracture. Ortho consulted   Pt had jalloh placed in ED and was found to have gross hematuria, CT abd and pelvis showed large pelvic mass suspicious for malignancy, bone scan was done and did not show any osseous metastasis, Heme/onc on board and was recommended for cystoscopy but pt refused cysto at this time.  Pt was given 1 PRBC for optimization prior to OR , S/p L hip IM nailing on 2/6, Patient s/p OR 2/8  for ORIF of left olecranon fracture.  Patient has post-operative ileus

## 2022-02-14 NOTE — PROGRESS NOTE ADULT - SUBJECTIVE AND OBJECTIVE BOX
Chief Complaint:  HPI:  Pt is a 89 yo F hx of urinary incont, HTN, mastectomy for left breast CA 30 yrs ago s/o fall at home while walking to bathroom. No LOC or head strike, brought in by EMS. S/p ORIF left olecranon on 2/8 and left hip IM nailing open on 2/6 by ortho.      - No acute events overnight, wants to get out of the hospital.  Aware she has not had bowel movement. abd xray done yesterday shows slight improvement of ileus, less air.    Allergies    No Known Allergies    Intolerances        REVIEW OF SYSTEMS:  CONSTITUTIONAL: No fever, weight loss, or fatigue.  RESPIRATORY: No cough, wheezing, chills or hemoptysis; No shortness of breath  CARDIOVASCULAR: No chest pain, lightheadedness, palpitations, dizziness, or leg swelling  GASTROINTESTINAL: No abdominal pain. No nausea, vomiting, no diarrhea/ +constipation. No melena or black tarry stools. No change in bowel habits.  GENITOURINARY: No dysuria, frequency, hematuria, + incontinence  SKIN: No itching, burning, rashes, or lesions   MUSCULOSKELETAL: No joint pain or swelling; No muscle, back, or extremity pain        Vital Signs Last 24 Hrs  T(C): 36.6 (14 Feb 2022 05:52), Max: 36.7 (13 Feb 2022 13:03)  T(F): 97.8 (14 Feb 2022 05:52), Max: 98.1 (13 Feb 2022 13:03)  HR: 82 (14 Feb 2022 05:52) (82 - 88)  BP: 133/63 (14 Feb 2022 05:52) (111/61 - 144/75)  BP(mean): --  RR: 18 (14 Feb 2022 05:52) (18 - 18)  SpO2: 100% (14 Feb 2022 05:52) (94% - 100%)    MedsMEDICATIONS  (STANDING):  cephalexin 500 milliGRAM(s) Oral four times a day  enoxaparin Injectable 40 milliGRAM(s) SubCutaneous every 24 hours  magnesium hydroxide Suspension 30 milliLiter(s) Oral once  pantoprazole    Tablet 40 milliGRAM(s) Oral before breakfast  polyethylene glycol 3350 17 Gram(s) Oral <User Schedule>  senna 2 Tablet(s) Oral at bedtime    MEDICATIONS  (PRN):  ondansetron Injectable 4 milliGRAM(s) IV Push every 4 hours PRN Nausea and/or Vomiting      PHYSICAL EXAM:  GENERAL: NAD, frail appearance  NEURO: AAOx3, MIKHAIL b/l  LUNG: Lungs clear to auscultation bilaterally, no wheezing, rhonchi, or rales.  HEART: S1, S2, no S3 or S4. Regular rate and rhythm. No murmurs, gallops, or rubs. No JVD  ABDOMEN: Bowel sounds present, abd Soft, Nontender, mildly distended  EXTREMITIES:  2+ Peripheral Pulses b/l, No clubbing, cyanosis. Trace edema in left hand.  SKIN: LUE ACE wrapped    Consultant(s) Notes Reviewed:  [x ] YES  [ ] NO  Care Discussed with Consultants/Other Providers [ x] YES  [ ] NO    LABS:                        9.6    28.62 )-----------( 456      ( 13 Feb 2022 07:18 )             29.5     02-13    136  |  110<H>  |  33<H>  ----------------------------<  105<H>  4.2   |  25  |  1.09    Ca    8.1<L>      13 Feb 2022 07:18  Mg     2.5     02-13    TPro  5.1<L>  /  Alb  1.7<L>  /  TBili  0.7  /  DBili  x   /  AST  12  /  ALT  8<L>  /  AlkPhos  79  02-13        RADIOLOGY & ADDITIONAL TESTS:    EKG:

## 2022-02-14 NOTE — PROGRESS NOTE ADULT - PROBLEM SELECTOR PLAN 2
-abdomen distended, AXR with air-fluid bowel loops  -recommend bowel rest to patient, but she is adamant on needing nutrition, she wants full diet but will only give liquid diet and monitor  -patient reports she is currently passing flatus  -encourage ambulation  -opioids discontinued, hold iron for now  -hold oxybutynin as may decrease colonic transit  -if develops nausea/vomiting would keep strictly NPO and may need NGT  -would ensure abdominal distension and having BM before discharge  -Give milk of mag this AM, increased miralax to TID x 2 days, recommend enema however patient is refusing.

## 2022-02-14 NOTE — PROGRESS NOTE ADULT - SUBJECTIVE AND OBJECTIVE BOX
89yFemale    Diagnosis:  S/p L Hip IM Nailing POD#8 and ORIF of Left Olecranon Fracture POD#6    Patient was seen and evaluated at bedside. Patient with no acute complaints.   Pain is well controlled.   Denies CP/SOB, dyspnea, paresthesias, N/V/D, palpitations.   Patient has not yet had a bowel movement, considering enema today.       Vital Signs Last 24 Hrs  T(C): 36.6 (02-14-22 @ 05:52), Max: 36.7 (02-13-22 @ 13:03)  T(F): 97.8 (02-14-22 @ 05:52), Max: 98.1 (02-13-22 @ 13:03)  HR: 82 (02-14-22 @ 05:52) (82 - 88)  BP: 133/63 (02-14-22 @ 05:52) (111/61 - 144/75)  BP(mean): --  RR: 18 (02-14-22 @ 05:52) (18 - 18)  SpO2: 100% (02-14-22 @ 05:52) (94% - 100%)          Physical Exam:    General: NAD, resting comfortably in bed.    Left UE: Splint intact at left arm. NVI. Left fingers with swelling, position adjusted on LUE on pillow to decrease swelling. ROM of fingers. Sensation intact.   Left Hip:  Dressing removed - minimal staining from middle incision - changed with mepilex.   Skin is pink and warm. Staples intact. No erythema. SILT.   Lower extremity:  No calf tenderness, calves are soft. 2+pulses. NVI. EHL/TA/gastrocnemius intact B/L.  Good capillary refill. Warm, well-perfused.                                                     9.6    28.62 )-----------( 456      ( 13 Feb 2022 07:18 )             29.5   02-13    136  |  110<H>  |  33<H>  ----------------------------<  105<H>  4.2   |  25  |  1.09    Ca    8.1<L>      13 Feb 2022 07:18  Mg     2.5     02-13    TPro  5.1<L>  /  Alb  1.7<L>  /  TBili  0.7  /  DBili  x   /  AST  12  /  ALT  8<L>  /  AlkPhos  79  02-13        Impression:  89yFemale  S/p L Hip IM Nailing POD#8 and ORIF of Left Olecranon Fracture POD#6  Plan:  -  Pain management  - dressing changed  -  Dvt prophylaxis with Lovenox  -  Daily Physical Theapy:  WBAT of left lower extremity and NWB of LUE  -  Keep LUE elevated where hand above elbow to decrease swelling at fingers  -  discharge planning [facility]     - f/u enema with medicine

## 2022-02-14 NOTE — PROGRESS NOTE ADULT - ATTENDING COMMENTS
pending 89yo F PMHx of HTN, Breast CA s/p left mastectomy who presented to the ED after a fall. Found to have a left olecranon and left hip fractures. Verduzco placed with gross hematuria. Patient underwent CT A/P which showed large pelvic mass suspicious for malignancy. Bone scan negative for metastasis. Urology and hematology consulted for pelvic mass, but patient refuses further treatment. Patient underwent left hip IM nailing on 2/6. Patient went to the OR 2/08 for olecranon repair. WBC downtrending, suspect stress reaction due to recent surgery. Patient declines further work-up for any cancer. Completed treatment for UTI, monitor for fevers or other signs of infection. Patient passed trial of void. Abdomen distended, patient likely constipated. AXR ordered which shows ileus. Changed to Full Liquid diet as patient does not want to be NPO. Patient given oral laxatives, but abdomen appears slightly less distended, but no bowel movement. Patient declining enema, but assents to it today. Monitor for BM. Patient would like second opinion so GI Dr. Peraza consulted. Recommends continue with bisacodyl for bowel stimulation. Follow-up other recommendations.

## 2022-02-14 NOTE — PROGRESS NOTE ADULT - ASSESSMENT
89yo Female lives alone at home, independent, ambulates sometimes with a rolator, baseline AAOx2-3 with PMH of Urinary incontinence, HTN and PSH of left mastectomy (left breast CA 30 yrs ago.  Presented to the ED after fall.  Admitted for left  olecranon fracture & left hip fracture. Ortho consulted   Pt had jalloh placed in ED and was found to have gross hematuria, CT abd and pelvis showed large pelvic mass suspicious for malignancy, bone scan was done and did not show any osseous metastasis, Heme/onc on board and was recommended for cystoscopy but pt refused cysto at this time.  Pt was given 1 PRBC for optimization prior to OR , S/p L hip IM nailing on 2/6, Patient s/p OR 2/8  for ORIF of left olecranon fracture.  Patient has post-operative ileus - refusing enemas

## 2022-02-15 NOTE — PROGRESS NOTE ADULT - PROBLEM SELECTOR PLAN 10
- pending ins auth for placement to [facility]   -Pt's HCP is Trinidad Kahn [phone] who is providing [facility] choices and assisting with plan of care  -CM following
- Pending ins auth for placement to [facility].  F/u w/ CM    - Pt's HCP is Trinidad Kahn [phone] who is providing [facility] choices and assisting with plan of care
- pending ins auth for placement to [facility]   -Pt's HCP is Trinidad Kahn [phone] who is providing [facility] choices and assisting with plan of care  -CM following
- pending ins auth for placement to [facility]   -Pt's HCP is Trinidad Kahn [phone] who is providing [facility] choices and assisting with plan of care  -CM following

## 2022-02-15 NOTE — PROGRESS NOTE ADULT - NUTRITIONAL ASSESSMENT
Diet, NPO after Midnight:      NPO Start Date: 07-Feb-2022,   NPO Start Time: 23:59 (02-07-22 @ 09:27) [Active]  Diet, Regular:   DASH/TLC {Sodium & Cholesterol Restricted} (02-03-22 @ 12:17) [Active]

## 2022-02-15 NOTE — PROGRESS NOTE ADULT - PROBLEM SELECTOR PROBLEM 9
Discharge planning issues
Discharge planning issues
Prophylactic measure
Discharge planning issues
Prophylactic measure
Discharge planning issues
Prophylactic measure
Prophylactic measure
Discharge planning issues

## 2022-02-15 NOTE — PROGRESS NOTE ADULT - PROBLEM SELECTOR PLAN 8
Home med: Losartan 50mg QD  - Stable off BP meds at this time  - Continue to monitor BP and adjust medication accordingly

## 2022-02-15 NOTE — CONSULT NOTE ADULT - CONSULT REASON
left hip pain
Left hip intertrochanteric fracture, closed, comminuted   Left olecranon, fracture, initial encounter
hematuria, pelvic mass, r/o bladder ca
ileus
rule out bladder cancer and leukocytosis   surgical intervention for new fractures

## 2022-02-15 NOTE — PROGRESS NOTE ADULT - PROBLEM SELECTOR PLAN 4
Hematuria on admission, now resolved   - 2/2 CT abd and pelvis showed large pelvic mass suspicious for malignancy  - 2/4 Bone scan without any evidence of osseous metastasis  - Pt refused cystoscopy   - HemeOnc-Dr. Meehan consulted, appreciated    - Urology-Dr. Patel consulted, appreciated

## 2022-02-15 NOTE — CONSULT NOTE ADULT - REASON FOR ADMISSION
Left elbow and hip fracture
left hip fx
Left elbow and hip fracture

## 2022-02-15 NOTE — PROGRESS NOTE ADULT - SUBJECTIVE AND OBJECTIVE BOX
NP Note discussed with  primary attending    Patient is a 89y old  Female who presents with a chief complaint of Left elbow and hip fracture (15 Feb 2022 09:52)      INTERVAL HPI/OVERNIGHT EVENTS: Pt seen and denies pain.  Informed NP that she will not take another enema.  Reports to feel "like I have to go but I'm not going."  Denies any other concerns.      MEDICATIONS  (STANDING):  bisacodyl 10 milliGRAM(s) Oral at bedtime  cephalexin 500 milliGRAM(s) Oral four times a day  enoxaparin Injectable 40 milliGRAM(s) SubCutaneous every 24 hours  metoclopramide 5 milliGRAM(s) Oral two times a day  pantoprazole    Tablet 40 milliGRAM(s) Oral before breakfast  polyethylene glycol 3350 17 Gram(s) Oral <User Schedule>  senna 2 Tablet(s) Oral at bedtime    MEDICATIONS  (PRN):  ondansetron Injectable 4 milliGRAM(s) IV Push every 4 hours PRN Nausea and/or Vomiting      __________________________________________________  REVIEW OF SYSTEMS:    CONSTITUTIONAL: No fever  EYES: No acute visual disturbances  NECK: No pain or stiffness  RESPIRATORY: No cough; No shortness of breath  CARDIOVASCULAR: No chest pain, no palpitations  GASTROINTESTINAL: No pain. No nausea or vomiting.  No diarrhea   NEUROLOGICAL: No headache or numbness, no tremors  MUSCULOSKELETAL: No joint pain, no muscle pain  GENITOURINARY: No dysuria, no frequency, no hesitancy  PSYCHIATRY: No depression , no anxiety  ALL OTHER  ROS negative        Vital Signs Last 24 Hrs  T(C): 36.9 (15 Feb 2022 13:06), Max: 36.9 (15 Feb 2022 13:06)  T(F): 98.5 (15 Feb 2022 13:06), Max: 98.5 (15 Feb 2022 13:06)  HR: 93 (15 Feb 2022 13:50) (88 - 98)  BP: 129/77 (15 Feb 2022 13:50) (117/72 - 140/61)  RR: 17 (15 Feb 2022 13:06) (16 - 18)  SpO2: 94% (15 Feb 2022 13:50) (94% - 99%)    ________________________________________________  PHYSICAL EXAM:  GENERAL: NAD  HEENT: Normocephalic;  conjunctivae and sclerae clear; moist mucous membranes;   NECK : Supple  CHEST/LUNG: Clear to auscultation bilaterally with good air entry   HEART: S1 S2  regular; no murmurs, gallops or rubs  ABDOMEN: Soft, Nontender, Nondistended; Bowel sounds present x 4 quad. Right lower and midline firmness upon palpation, no pain or guarding noted.   EXTREMITIES: (+) Left arm sling and ace wrap, intact and dry, mild edema of fingers:  Decreased ROM of LLE.  No calf tenderness  SKIN: Warm and dry; no rash.  (+) LLE Post op dsg x 2 clean, dry and intact; B/l LE skin c/w PVD.  NERVOUS SYSTEM:  Awake and alert; Oriented  to place, person and time ; no new deficits  _________________________________________________  LABS:              CAPILLARY BLOOD GLUCOSE            RADIOLOGY & ADDITIONAL TESTS:    Imaging Personally Reviewed:  YES    Consultant(s) Notes Reviewed:   YES    Care Discussed with Consultants :     Plan of care was discussed with patient and /or primary care giver; all questions and concerns were addressed and care was aligned with patient's wishes.

## 2022-02-15 NOTE — CONSULT NOTE ADULT - SUBJECTIVE AND OBJECTIVE BOX
GI INITIAL CONSULT    HPI: Pt is a 89 yo F lives alone at home, independent, ambulates sometimes with a rolator, baseline AAOx2-3 with PMH of Urinary incontinence, HTN and PSH of left mastectomy (left breast CA 30 yrs ago) presents to the ED after she had a fall this morning at home. Pt was going to the bathroom around 3am and on the way tripped between her legs and fell down. She couldn't stand up after the incident and called for help. She did not hit her head on the floor or lose consciousness. Her neighbor heard her around 7am and helped her to get up and called EMS to come to the hospital. GI consulted for recent development of ileus seen on abd XR, additionally pt was resistant to adhering to bowel regimens.    PMH: HTN, breast CA, urinary incontinence  PSH: L mastectomy    Meds: MEDICATIONS  (STANDING):  bisacodyl 10 milliGRAM(s) Oral at bedtime  cephalexin 500 milliGRAM(s) Oral four times a day  enoxaparin Injectable 40 milliGRAM(s) SubCutaneous every 24 hours  pantoprazole    Tablet 40 milliGRAM(s) Oral before breakfast  polyethylene glycol 3350 17 Gram(s) Oral <User Schedule>  senna 2 Tablet(s) Oral at bedtime    MEDICATIONS  (PRN):  ondansetron Injectable 4 milliGRAM(s) IV Push every 4 hours PRN Nausea and/or Vomiting    SH: not contributory  FH: not contributory  ROS:  CONSTITUTIONAL: No fever, weight loss, or fatigue  EYES: No eye pain, visual disturbances, or discharge  ENT:  No difficulty hearing, tinnitus, vertigo; No sinus or throat pain  NECK: No pain or stiffness  RESPIRATORY: No cough, wheezing, chills or hemoptysis, shortness of Breath  CARDIOVASCULAR: No chest pain, palpitations, passing out, dizziness, or leg swelling  GASTROINTESTINAL: see HPI  GENITOURINARY: No dysuria, frequency, hematuria, or incontinence  NEUROLOGICAL: No headaches, memory loss, loss of strength, numbness, or tremors  SKIN: No itching, burning, rashes, or lesions   MUSCULOSKELETAL: No arthralgia, myalgia, or back pain.     Vitals: Vital Signs Last 24 Hrs  T(C): 36.8 (15 Feb 2022 05:38), Max: 37.8 (14 Feb 2022 13:14)  T(F): 98.3 (15 Feb 2022 05:38), Max: 100 (14 Feb 2022 13:14)  HR: 88 (15 Feb 2022 05:38) (88 - 89)  BP: 117/72 (15 Feb 2022 05:38) (116/70 - 136/68)  RR: 16 (15 Feb 2022 05:38) (16 - 18)  SpO2: 96% (15 Feb 2022 05:38) (96% - 100%)    Gen: NAD  CVS: S1/S2  Chest: CTABL  Abd: S/NT/Mild distension        Imaging: < from: Xray Abdomen 1 View PORTABLE -Routine (Xray Abdomen 1 View PORTABLE -Routine .) (02.13.22 @ 10:13) >  INTERPRETATION:  Abdomen one view    HISTORY: Postop ileus    COMPARISON: 2/11/2022    Frontal view of the abdomen shows fewer air-filled bowel loops. There is   no evidence of organomegaly. Splenic cyst is again noted. No definite   free air is identified. Left hip hardware is partially seen.    IMPRESSION:  Decreased bowel gas.

## 2022-02-15 NOTE — PROGRESS NOTE ADULT - SUBJECTIVE AND OBJECTIVE BOX
89yFemale    Diagnosis:  S/p L Hip IM Nailing POD#9 and ORIF of Left Olecranon Fracture POD#7    Patient was seen and evaluated at bedside. Patient with no acute complaints.   Pain is well controlled.   Denies CP/SOB, dyspnea, paresthesias, N/V/D, palpitations.   Patient has not yet had a bowel movement.    Vital Signs Last 24 Hrs  T(C): 36.8 (15 Feb 2022 05:38), Max: 37.8 (14 Feb 2022 13:14)  T(F): 98.3 (15 Feb 2022 05:38), Max: 100 (14 Feb 2022 13:14)  HR: 88 (15 Feb 2022 05:38) (88 - 89)  BP: 117/72 (15 Feb 2022 05:38) (116/70 - 136/68)  BP(mean): --  RR: 16 (15 Feb 2022 05:38) (16 - 18)  SpO2: 96% (15 Feb 2022 05:38) (96% - 100%)        Physical Exam:    General: NAD, resting comfortably in bed.    Left UE: Splint intact at left arm. NVI. Left fingers with swelling, position adjusted on LUE on pillow to decrease swelling. ROM of fingers. Sensation intact.   Left Hip:  Dressing removed - minimal staining from middle incision - changed with mepilex.   Skin is pink and warm. Staples intact. No erythema. SILT.   Lower extremity:  No calf tenderness, calves are soft. 2+pulses. NVI. EHL/TA/gastrocnemius intact B/L.  Good capillary refill. Warm, well-perfused.                                             Impression:  89yFemale  S/p L Hip IM Nailing POD#9 and ORIF of Left Olecranon Fracture POD#7  Plan:  -  Pain management  -  Dvt prophylaxis with Lovenox  -  Daily Physical Theapy:  WBAT of left lower extremity and NWB of LUE  -  Keep LUE elevated where hand above elbow to decrease swelling at fingers  -  discharge planning SETH   -  GI consult, recommends continue current bowel regimen. Recent abd xray shows fewer air filled bowel loops

## 2022-02-15 NOTE — PROGRESS NOTE ADULT - PROBLEM SELECTOR PROBLEM 8
Prophylactic measure
Hypertension
Prophylactic measure
Prophylactic measure
Hypertension
Prophylactic measure
Hypertension
Hypertension
Prophylactic measure
Prophylactic measure

## 2022-02-15 NOTE — PROGRESS NOTE ADULT - COVID-19 NEGATIVE LAB RESULT
COVID-19 ruled out

## 2022-02-15 NOTE — CONSULT NOTE ADULT - ASSESSMENT
89F w/ Ileus  Most recent abd XR shows fewer air-filled bowel loops  - Pt strongly recommended to adhere to bowel regimen  - Fleet enema TID  - Dulcolax 10mg QHS  - Reglan 5mg BID  - further care by primary team 89F w/ Ileus  Most recent abd XR shows fewer air-filled bowel loops  - Pt strongly recommended to adhere to bowel regimen  - tap water enema TID  - Dulcolax 10mg QHS  - Reglan 5mg BID  - further care by primary team

## 2022-02-15 NOTE — PROGRESS NOTE ADULT - PROBLEM SELECTOR PLAN 7
Reactive leukocytosis vs malignancy  - At last check 2/13 mild down trend from admission  - S/p Ceftriaxone as noted   - Pt refused further work up for pelvic mass at this time   - HemeOnc-Dr. Meehan consulted, appreciated

## 2022-02-15 NOTE — PROGRESS NOTE ADULT - PROBLEM SELECTOR PLAN 1
s/p fall with  left hip and olecranon  fracture  - S/p L hip IM nailing on 2/6  - WBAT on LLE  - C/w pain mgmt   - Ortho-Dr. Majano consulted, appreciated   - Pain mgmt consulted, appreciated   - PT recommends SETH

## 2022-02-15 NOTE — PROGRESS NOTE ADULT - ATTENDING COMMENTS
89yo F PMHx of HTN, Breast CA s/p left mastectomy who presented to the ED after a fall. Found to have a left olecranon and left hip fractures.     No BM today patient refusing enemas despite extensive discussion    #Abdominal distention and constipation - ileus on XRay  #Olecranon and left hip fractures s/p left hip IM nailing on 2/6  #Large pelvic mass suspicious for malignancy  #Leukocytosis possibly 2/2 stress reaction after surgery, downtrending  #UTI s/p treatment  - endorsed to patient regarding aggressive bowel regimen but refusing enema today  - repeat XR abdomen toay with decreased bowel gas  - continue bisacodyl   - full liquid diet  - GI dr. Peraza following  - patient denies any further work up for pelvic mass  - monitor for BM

## 2022-02-16 NOTE — DISCHARGE NOTE NURSING/CASE MANAGEMENT/SOCIAL WORK - PATIENT PORTAL LINK FT
You can access the FollowMyHealth Patient Portal offered by [facility] by registering at the following website: [email or web address]. By joining [facility] FollowMyHealth portal, you will also be able to view your health information using other applications (apps) compatible with our system.

## 2022-02-16 NOTE — PROGRESS NOTE ADULT - SUBJECTIVE AND OBJECTIVE BOX
89 y/o Female    Diagnosis:  S/p L Hip IM Nailing POD#10 and ORIF of Left Olecranon Fracture POD#8    Patient was seen and evaluated at bedside. Patient with no acute complaints.   Pain is well controlled. Pt had a BM yesterday and notes overall improvement in her condition today.   Denies CP/SOB, dyspnea, paresthesias, N/V/D, palpitations.     Vital Signs Last 24 Hrs  T(C): 36.7 (16 Feb 2022 05:21), Max: 36.9 (15 Feb 2022 13:06)  T(F): 98.1 (16 Feb 2022 05:21), Max: 98.5 (15 Feb 2022 13:06)  HR: 83 (16 Feb 2022 05:21) (83 - 98)  BP: 126/58 (16 Feb 2022 05:21) (122/71 - 140/61)  BP(mean): --  RR: 18 (16 Feb 2022 05:21) (17 - 18)  SpO2: 96% (16 Feb 2022 05:21) (94% - 99%)  I&O's Detail      Physical Exam:    General: AAOx3, NAD, resting comfortably in bed.    Left UE: Splint intact at left arm. NVI. Left fingers with swelling, position adjusted on LUE on pillow to decrease swelling. ROM of fingers. Sensation intact.   Left Hip:  Dressing removed C/D/I, Skin is pink and warm. Staples intact. No erythema. SILT. No swelling.   Lower extremity:  No calf tenderness, calves are soft. 2+pulses. NVI. (+)EHL/FHL/ADF/APF.  Good capillary refill. Warm, well-perfused.                           9.7    29.12 )-----------( 489      ( 16 Feb 2022 07:11 )             30.2     02-16    136  |  105  |  31<H>  ----------------------------<  100<H>  4.1   |  25  |  1.05    Ca    8.1<L>      16 Feb 2022 07:11  Phos  3.1     02-16  Mg     2.5     02-16    TPro  5.1<L>  /  Alb  1.5<L>  /  TBili  0.8  /  DBili  x   /  AST  10  /  ALT  9<L>  /  AlkPhos  93  02-16       Impression:  89 y/o Female S/p L Hip IM Nailing POD#10 and ORIF of Left Olecranon Fracture POD#8  Plan:  -  Pain control  -  DVT prophylaxis with Lovenox  -  Daily Physical Therapy:  WBAT of left lower extremity and NWB of LUE  -  Keep LUE elevated where hand above elbow to decrease swelling at fingers  -  discharge planning SETH today  -  Case D/w Dr. Majano

## 2022-02-16 NOTE — PROGRESS NOTE ADULT - REASON FOR ADMISSION
Left elbow and hip fracture

## 2022-02-16 NOTE — PROGRESS NOTE ADULT - PROVIDER SPECIALTY LIST ADULT
Orthopedics
Heme/Onc
Orthopedics
Urology
Urology
Orthopedics
Pain Medicine
Urology
Urology
Heme/Onc
Heme/Onc
Orthopedics
Pain Medicine
Urology
Pain Medicine
Internal Medicine
Pain Medicine
Internal Medicine
Pain Medicine
Pain Medicine
Orthopedics
Internal Medicine
Pain Medicine
Internal Medicine

## 2022-03-15 PROBLEM — I10 ESSENTIAL (PRIMARY) HYPERTENSION: Chronic | Status: ACTIVE | Noted: 2022-01-01

## 2022-03-15 NOTE — ED PROVIDER NOTE - PROGRESS NOTE DETAILS
verito marks from nephro - will see pt. verito yusuf icu. will see pt. pt requests no aggressive treatment / dnr / dni / no dialysis. pt proxy bedside in agreement. discussed the case with the admitting MD who accepts the patient for admission . dr smith requests no ac / cta for now

## 2022-03-15 NOTE — H&P ADULT - ASSESSMENT
Patient is a 89 y/o Female, from [facility], with medical hx significant for HTN, Bladder mass, Breast cancer s/p Mastectomy, Hx fracture, S/p Left hip IM nailing, and ORIF of Left olecranon fracture, sent from the facility with concern for altered mental status and hypoxia Patient is a 89 y/o Female, [facility], with medical hx significant for HTN, Bladder mass, Breast cancer s/p Mastectomy, Hx fracture, S/p Left hip IM nailing, and ORIF of Left olecranon fracture, sent from the facility with concern for hypoxia, admitted for Acute Hypoxic Respiratory Failure and ABDON likely in the setting of obstructive uropathy

## 2022-03-15 NOTE — ED PROVIDER NOTE - PHYSICAL EXAMINATION
Constitutional/Abdominal: frail-appearing, A&O x2, left arm is in a sling, abdominal mass, on nasal cannula

## 2022-03-15 NOTE — CONSULT NOTE ADULT - ASSESSMENT
ASSESSMENT AND PLAN:   89 year old female with hx if breast ca and bladder mass was sent to ED for worseining hypoxia . Patient is admitted for acute hypoxic respiratory failure and acute renal failure on CKD , ICU was consulted for Hyperkalemia and renal failure.    -Acute Hypoxic respiratory failure  -Acute renal failure on CKD  -Metabolic acidosis  -Hyperkalemia  -Hyponatremia  -Leukocytosis  -UTI  -Left sided consolidation Pneumonia vs metastatic disease  -Thrombocytosis        Neuro  - Patient is AAO X 3   no new deficits.       Cardiovascular  - Patient with hx of HTN. would recommend to hold blood pressure meds in setting of possible infection.    Pulmonary  - Acute Hypoxic respiratory failure  Patients CXR showed Left sided consolidation.  Follow CT chest.  Will treat for HCAP.   s/p Zosyn in ER. continue with Zosyn, renally dosed.    Infections  - Patient with severe leukocytosis with no fever in ER.  UA is positive with WBC and leukocyte esterase , also CXR showed left sided consolidation.  Will treat with Zosyn.  Follow urine and blood cultures.      Nephro  -  Acute renal failure  Patient s labs showed creatinine of 6, last month it was 1.05.  It can be pre renal in setting of poor hydration and diarrhea , also post renal in setting of bladder mass.  s/p Verduzco in ER.  s/p 1.5 Lr in ER.  Strict Is and Os. Follow CT Abdomen and urine lytes.  monitor urine out put.  follow repeat BMP.    Gastrointestinal  - Patient having diarrhea  follow stool cultures and GI PCR.  if persist consider C-diff    Heme  - leukocytosis:  Patient with WBC of 61 K.   can be in setting of underlying malignancy. Follow CT chest and abdomen.     Thrombocytosis :  reactive likely.      Endocrine  - no issues    Skin/Catheters  - Patient with Verduzco catheter placed in ED      Prophylaxis   - heparin for dvt ppx,      Dispo: Based on GOC conversation , and improved hemodymanics patient is not accepted to ICU.

## 2022-03-15 NOTE — ED PROVIDER NOTE - CLINICAL SUMMARY MEDICAL DECISION MAKING FREE TEXT BOX
Will evaluate for hypoxia w/ basic blood work, chest X-ray, and consider CT angio. Will also consider brain scan screening for infection w/ chest X-ray and urine sample, basic blood work. Blood work from March 2 most noticeable for white blood cell of 27,000. Of note, she has an abdominal mass that they declined to be further evaluated. Call to the patient's family member went unanswered.

## 2022-03-15 NOTE — ED PROVIDER NOTE - CARE PLAN
1 Principal Discharge DX:	ABDON (acute kidney injury)  Secondary Diagnosis:	Pleural effusion, left  Secondary Diagnosis:	Hydronephrosis

## 2022-03-15 NOTE — H&P ADULT - HISTORY OF PRESENT ILLNESS
Patient is a 89 y/o Female, from [facility], with medical hx significant for HTN, Bladder mass, Breast cancer s/p Mastectomy, Hx fracture, S/p Left hip IM nailing, and ORIF of Left olecranon fracture, sent from the facility with concern for altered mental status and hypoxia. Pt says she is fine and she does not have any breathing issues, abdominal pain, dysuria, chest pain, nausea, vomiting, diarrhea, numbness, weakness. However she was noted to be in moderate level of pain when I was interviewing her with her HCP at bedside when she was attempting to void. HCP at bedside Trinidad Brian endorses that ever since pt was moved to Rehab after her fracture repair, she has not been eating well and lost about likely 10-15lbs. Her ambulation has been minimal given hip fracture repair with regular PT sessions. Pt has been having worsening abdominal distension ever since she was diagnosed with the bladder mass, has been to a Urologist who has been prescribing her antibiotics (however it seems that patient may have had the knowledge about her bladder mass longer than when her HCP had known about the condition). Pt's HCP says pt has had noticeably increased WOB and could hear the wheezing for over the last 1-2 weeks. Pt used to be very independent before the fracture of her hip in Feb 2022, she lived alone and did all her ADLs by herself.    In the ED, pts vitals were   Temp Afebrile, HR 89, /76, RR 18 with 90% saturation on RA  CT chest showing bilateral pleural effusions, left pleural effusion large with loculation  CT abdomen and pelvis shows Large pelvic mass with new bilateral HUN, left sided hydronephrosis and severe hydroureter  WBC 61k, BUN/Cr 124/6.36 with hyperkalemia 6.3> 1 dose of Vanc and Zosyn, 1.5 L NS bolus, Albuterol neb x 3, Lokelma 10, Insulin reg 10 units with D50>potassium 5.5  U/A positive s/p Merrem in ED

## 2022-03-15 NOTE — H&P ADULT - NSICDXPASTMEDICALHX_GEN_ALL_CORE_FT
PAST MEDICAL HISTORY:  Breast cancer s/p Mastectomy    Fracture of hip S/p Left hip IM nailing    Hypertension     Olecranon fracture S/p ORIF

## 2022-03-15 NOTE — H&P ADULT - PROBLEM SELECTOR PLAN 1
Pt p/w hypoxia from NH, saturating mid 90s with 2-3 L NC, Left lung white out in Cxray  CT chest showing bilateral pleural effusions, left large pleural effusion with loculation  Observe for now, pt does not wish for aggressive intervention  C/w Albuterol inhaler PRN and Ipratropium bromide inhaler daily  Oxygen support PRN for hypoxia Pt p/w hypoxia from NH, saturating mid 90s with 2-3 L NC, Left lung white out in Cxray  CT chest showing bilateral pleural effusions, left large pleural effusion with loculation  Observe for now, pt does not wish for aggressive intervention  C/w Albuterol inhaler PRN and Ipratropium bromide inhaler daily  D-dimers likely elevated in the setting of infection  Oxygen support PRN for hypoxia  If patient does not clinically improve over the next 24 hours, will consult Palliative care

## 2022-03-15 NOTE — CONSULT NOTE ADULT - SUBJECTIVE AND OBJECTIVE BOX
Patient is a 89y old  Female who presents with a chief complaint of     Initial HPI on admission:  HPI: 89 year old female with history of Breast cancer s/p mastectomy, bladder mass which patient refused work up on last admission , PSH or hip fracture s/p repair was sent in from NH for hypoxia. Patient is AAO X 3, denies any pain or fever. Patient was agiates in the begining but calmed down when her neighbour also HCP came at bed side. Patient denies any pain, difficulty breathing , nausea, vomiting, or other problems.      ER course: Patient was sent in for hypoxia from nursing home, CXR was done showed left sided opacity covering most of the lung. Patients ;labs showed creatinine of 6 with BUN of 124 and potassium of 6.3. ICU was consulted for acute renla failure and hyperkalemia.,    PAST MEDICAL & SURGICAL HISTORY:  Urinary frequency    Hypertension      Allergies    No Known Allergies    Intolerances      FAMILY HISTORY:          Medications:  ALBUTerol    0.083%. 2.5 milliGRAM(s) Nebulizer once  piperacillin/tazobactam IVPB. 3.375 Gram(s) IV Intermittent Once  sodium zirconium cyclosilicate 10 Gram(s) Oral Once  sodium zirconium cyclosilicate 10 Gram(s) Oral every 8 hours      vent settings      Vital Signs Last 24 Hrs  T(C): 36.4 (15 Mar 2022 15:59), Max: 36.7 (15 Mar 2022 12:30)  T(F): 97.5 (15 Mar 2022 15:59), Max: 98 (15 Mar 2022 12:30)  HR: 68 (15 Mar 2022 15:59) (68 - 89)  BP: 127/77 (15 Mar 2022 15:59) (116/76 - 127/77)  BP(mean): --  RR: 18 (15 Mar 2022 15:59) (18 - 18)  SpO2: 94% (15 Mar 2022 15:59) (90% - 94%)              LABS:                        11.3   61.56 )-----------( 471      ( 15 Mar 2022 13:54 )             35.5     03-15    130<L>  |  100  |  124<H>  ----------------------------<  80  6.3<HH>   |  15<L>  |  6.36<H>    Ca    9.1      15 Mar 2022 13:54    TPro  6.5  /  Alb  2.1<L>  /  TBili  0.5  /  DBili  x   /  AST  10  /  ALT  12  /  AlkPhos  127<H>  03-15          CAPILLARY BLOOD GLUCOSE          Urinalysis Basic - ( 15 Mar 2022 14:32 )    Color: Ivette / Appearance: very cloudy / S.010 / pH: x  Gluc: x / Ketone: Negative  / Bili: Negative / Urobili: 1   Blood: x / Protein: 500 mg/dL / Nitrite: Negative   Leuk Esterase: Moderate / RBC: 2-5 /HPF / WBC 26-50 /HPF   Sq Epi: x / Non Sq Epi: Occasional /HPF / Bacteria: x      CULTURES:        Physical Examination:    GENERAL: elderly female, cachectic on nasal cannula  HEAD:  Atraumatic, Normocephalic  EYES: EOMI, PERRLA, conjunctiva and sclera clear  NECK: Supple, No JVD  CHEST/LUNG: crackles on left side, no wheezing.  HEART: Regular rate and rhythm; No murmurs;   ABDOMEN: Soft, Nontender, Nondistended; Bowel sounds present; No guarding  EXTREMITIES:  2+ Peripheral Pulses, No cyanosis or edema  PSYCH:  Normal Affect  NEUROLOGY: AAO X 3,  no new deficits  SKIN: dry, hyperpigmentation on left lower leg.    RADIOLOGY REVIEWED :

## 2022-03-15 NOTE — ED PROVIDER NOTE - OBJECTIVE STATEMENT
89 y/o female was recently admitted for fractures. Per [facility], documentation was sent to the ER for hypoxia, lethargy, AMS. Patient cannot contribute to history and states that she feels fine and wants to be left alone. Patient denies any other symptoms. NKDA.

## 2022-03-15 NOTE — H&P ADULT - PROBLEM SELECTOR PLAN 2
Pt p/w BUN/Cr 124/6.36 with hyperkalemia 6.3>>Albuterol neb x 3, Lokelma 10, Insulin reg 10 units with D50>>repeat potassium 5.5  Baseline creatinine appears to be around 1.03-1.06  Likely in the setting of Post obstructive uropathy as CT abdomen shows new bilateral HUN, left sided hydronephrosis and severe hydroureter vs pre-renal given poor PO intake  -U/A positive, Will treat with Merrem given pt has been treated repeatedly with antibiotics outpatient  Repeat BMP later tonight  Consult Dr. Rodriguez*** Pt p/w BUN/Cr 124/6.36 with hyperkalemia 6.3>>Albuterol neb x 3, Lokelma 10, Insulin reg 10 units with D50>>repeat potassium 5.5  Baseline creatinine appears to be around 1.03-1.06  Likely in the setting of Post obstructive uropathy as CT abdomen shows new bilateral HUN, left sided hydronephrosis and severe hydroureter vs pre-renal given poor PO intake  -U/A positive, Will treat with Merrem given pt has been treated repeatedly with antibiotics outpatient  Repeat BMP later tonight  Consulted Dr Rodriguez    *******Added on uric acid in view of possible tumor lysis, if uric acid is elevated, can give Rasburicase 6 mg IV dose x 1

## 2022-03-15 NOTE — H&P ADULT - PROBLEM SELECTOR PLAN 5
Pt has hx of HTN, takes Losartan 50 mg   HOLD in the setting of ABDON and soft BP  Monitor BP Pt has acute UTI on U/A, CT abdomen shows HUN likely 2/2 Bladder mass  Will treat with IV Merrem 500 Qdaily, will increase the frequency to Q12 if pt's creatinine improves  As patient has been treated by Urologist multiple times, suspect antimicrobial resistance, hence started on IV Merrem  Dr. Taveras consulted

## 2022-03-15 NOTE — H&P ADULT - PROBLEM SELECTOR PLAN 4
Pt p/w WBC 61k, baseline appears to be maximum of 25-30k  Likely in the setting of infection, and dehydration as pt has been having poor PO intake  Monitor for improvement with CBC daily and s/p IVF administration Pt p/w WBC 61k, baseline appears to be maximum of 25-30k  Likely in the setting of infection 2/2 UTI, and dehydration as pt has been having poor PO intake  Monitor for improvement with CBC daily and s/p IVF administration and Merrem Pt p/w WBC 61k, baseline appears to be maximum of 25-30k  Likely in the setting of infection 2/2 UTI, and dehydration as pt has been having poor PO intake  Monitor for improvement with CBC daily and s/p IVF administration and Merrem  F/u urine and blood cultures

## 2022-03-15 NOTE — H&P ADULT - PROBLEM SELECTOR PLAN 7
Pt has hx of Breast cancer s/p Mastectomy  Supportive care Pt has diagnosed bladder mass, visits Urologist regularly and has been on antibiotics repeatedly  Pt did not want to be further worked up, however likely malignant mass with now secondary obstruction reflecting as DOMINIQUE Ferrari**** to be consulted Pt has diagnosed bladder mass, visits Urologist regularly and has been on antibiotics repeatedly  Pt did not want to be further worked up, however likely malignant mass with now secondary obstruction reflecting as HUN  Surgery ORVILLE Vilelgas was informed regarding informing Urology

## 2022-03-15 NOTE — H&P ADULT - PROBLEM SELECTOR PLAN 6
Heparin SC for DVT ppx Pt has diagnosed bladder mass, visits Urologist regularly and has been on antibiotics repeatedly  Pt did not want to be further worked up, however likely malignant mass with now secondary obstruction reflecting as DOMINIQUE Ferrari**** to be consulted Pt has hx of HTN, takes Losartan 50 mg   HOLD in the setting of ABDON and soft BP  Monitor BP

## 2022-03-15 NOTE — ED ADULT NURSE NOTE - NSIMPLEMENTINTERV_GEN_ALL_ED
Implemented All Fall with Harm Risk Interventions:  [place] to call system. Call bell, personal items and telephone within reach. Instruct patient to call for assistance. Room bathroom lighting operational. Non-slip footwear when patient is off stretcher. Physically safe environment: no spills, clutter or unnecessary equipment. Stretcher in lowest position, wheels locked, appropriate side rails in place. Provide visual cue, wrist band, yellow gown, etc. Monitor gait and stability. Monitor for mental status changes and reorient to person, place, and time. Review medications for side effects contributing to fall risk. Reinforce activity limits and safety measures with patient and family. Provide visual clues: red socks.

## 2022-03-15 NOTE — ED ADULT NURSE NOTE - OBJECTIVE STATEMENT
Patient sent from [facility] sent as per EMS for hypoxia , lethargy and AMS. Patient is confused awake and verbal

## 2022-03-15 NOTE — CONSULT NOTE ADULT - CONVERSATION DETAILS
Patients  present condition possible diagnosis and prognosis was discussed with the patient with HCP Ms Christie Brian at bed side.,  Patient was informed that she has acute kidney failure and if conservative measures fail to improve the kidney function next step would be dialysis.  Patient stated that she doesn't want any dialysis to be done. Also risks Vs benefit of CPR and intubation was discussed at bed side , with HCP and nurse present , Patient stated that if I die let me go, Patient refused any artificial measures to prolong her life. She refused CPR and mechanical ventilation.

## 2022-03-15 NOTE — H&P ADULT - NSHPREVIEWOFSYSTEMS_GEN_ALL_CORE
REVIEW OF SYSTEMS:     CONSTITUTIONAL: No weakness, fevers or chills  EYES/ENT: No visual changes;  No vertigo or throat pain   NECK: No pain or stiffness  RESPIRATORY: No cough, wheezing, hemoptysis; No shortness of breath  CARDIOVASCULAR: No chest pain or palpitations  GASTROINTESTINAL: No abdominal or epigastric pain. No nausea, vomiting, or hematemesis; No diarrhea or constipation. No melena or hematochezia.  GENITOURINARY: No dysuria, frequency or hematuria  NEUROLOGICAL: No numbness or weakness  SKIN: No itching, burning, rashes, or lesions   All other review of systems is negative unless indicated above.

## 2022-03-15 NOTE — H&P ADULT - ATTENDING COMMENTS
Patient seen and examined.    Vital Signs Last 24 Hrs  T(C): 36.4 (15 Mar 2022 15:59), Max: 36.7 (15 Mar 2022 12:30)  T(F): 97.5 (15 Mar 2022 15:59), Max: 98 (15 Mar 2022 12:30)  HR: 68 (15 Mar 2022 15:59) (68 - 89)  BP: 127/77 (15 Mar 2022 15:59) (116/76 - 127/77)  RR: 18 (15 Mar 2022 15:59) (18 - 18)  SpO2: 94% (15 Mar 2022 15:59) (90% - 94%)    P/E:  elderly, cachectic female, appear very dehydrated  Psych: AAO x3  Neuro: No gross focal deficits; Power and sensation intact  CVS: S1S2 present, regular, no edema  Resp: BLAE+, No wheeze or Rhonchi  GI: Soft, BS+, Non tender, non distended  Extr: No  calf tenderness B/L Lower extremities  Skin: Warm and moist without any rashes Patient seen and examined.    Vital Signs Last 24 Hrs  T(C): 36.4 (15 Mar 2022 15:59), Max: 36.7 (15 Mar 2022 12:30)  T(F): 97.5 (15 Mar 2022 15:59), Max: 98 (15 Mar 2022 12:30)  HR: 68 (15 Mar 2022 15:59) (68 - 89)  BP: 127/77 (15 Mar 2022 15:59) (116/76 - 127/77)  RR: 18 (15 Mar 2022 15:59) (18 - 18)  SpO2: 94% (15 Mar 2022 15:59) (90% - 94%)    P/E:  elderly, cachectic female, appear very dehydrated  Psych: AAO x3  Neuro: No gross focal deficits; Power and sensation intact  CVS: S1S2 present, regular, no edema  Resp: BLAE+, No wheeze or Rhonchi  GI: Soft, BS+, Non tender, non distended  Extr: No  calf tenderness B/L Lower extremities  Skin: Warm and moist without any rashes    Labs:                        11.3   61.56 )-----------( 471      ( 15 Mar 2022 13:54 )             35.5   03-15    132<L>  |  104  |  115<H>  ----------------------------<  161<H>  5.5<H>   |  17<L>  |  5.73<H>    Ca    8.6      15 Mar 2022 17:19  Phos  5.2     03-15  Mg     2.5     03-15    TPro  6.5  /  Alb  2.1<L>  /  TBili  0.5  /  DBili  x   /  AST  10  /  ALT  12  /  AlkPhos  127<H>  03-15    CT Abdomen and Pelvis No Cont (03.15.22 @ 17:00)   FINDINGS:  CHEST:  LUNGS AND LARGE AIRWAYS: Patent central airways. There are scattered small right pulmonary nodules in the upper lobe on image 33 of series 2   and in the right middle lobe on image 85 are likely infectious inflammatory. Not exclude neoplastic etiology.  PLEURA: Large left pleural effusion with extension into the fissure and some loculation. Small right pleural effusion extends into the major fissure.  VESSELS: Atherosclerotic changes of the aorta and coronary arteries.  HEART: Heart size is normal. No pericardial effusion.  MEDIASTINUM AND ZOILA: No lymphadenopathy.  CHEST WALL AND LOWER NECK: Left mastectomy  ABDOMEN AND PELVIS:  LIVER: Within normal limits.  BILE DUCTS: Normal caliber.  GALLBLADDER: Within normal limits.  SPLEEN: Coarsely peripherally calcified splenic cyst again noted.  PANCREAS: Within normal limits.  ADRENALS: Within normal limits.  KIDNEYS/URETERS: There is new severe right hydronephrosis and hydroureter likely secondary to the large pelvic mass. There is new moderate left   hydronephrosis as well.  BLADDER: Verduzco catheter. Bladder is inseparable from the pelvic mass which may arise from the bladder is partially based on the prior contrast-enhanced study  REPRODUCTIVE ORGANS: The uterus is not identified. There is again a complex cystic mass with calcified septations in the pelvis extending to   lower abdomen measuring 13.5 cm AP by 10.9 cm transverse on image 15 of series 2  BOWEL: No bowel obstruction. Appendix is not visualized. No evidence of inflammation in the pericecal region. There is diverticulosis without   diverticulitis  PERITONEUM: No ascites.  VESSELS: Atherosclerotic changes.  RETROPERITONEUM/LYMPH NODES: No lymphadenopathy.  ABDOMINAL WALL: Within normal limits.  BONES: Osteopenia and degenerative changes. ORIF of the left hip for subacute fracture which was acute on the prior imaging study. Compression   fractures are again seen with the most severe fracture at T12. There is also mild compression of L2 and L3 again noted. There are also moderate   to severe mid thoracic compression fracture    IMPRESSION: Cystic large pelvic mass with calcified septations is again identified. This appears significantly increased in size from the prior   study suggesting aggressive tumor.  New bilateral hydronephrosis likely secondary to pelvic mass. Left-sided hydronephrosis and hydroureter is severe. It is moderate on the left.  Bilateral pleural effusions are new from the prior study. Left pleural effusion is large with loculation  New small right-sided pulmonary nodules may be infectious inflammatory   versus neoplastic.  Since the prior study patient has undergone ORIF for left hip fracture    D/D:  ABDON likely Obstructive Uropathy plus ATN from possible Hypotension Patient seen and examined.    89 year old female with history of Breast cancer s/p mastectomy, prior known bladder mass which patient refused work up on last admission , PSH of recent hip fracture s/p Left hip IM nailing, and ORIF of Left olecranon fracture, sent from the facility ([facility]) with concern for altered mental status and hypoxia. Patient is AAO X 3, but in visible distress and appear to be in pain. although denies any pain or fever.     Patient noted to be in ABDON with BUN/ Cr 124/6.5 and K 6.5; ICU consulted but given patient wishes to be kept comfortable and no intervention for bladder mass downgraded to Medicine. Patient has not been eating well and lost about likely 10-15lbs. Pt has been having worsening abdominal distension ever since she was diagnosed with the bladder mass, has been to a Urologist (Dr. Patel) who has been prescribing her antibiotics (however it seems that patient may have had the knowledge about her bladder mass longer than when her HCP had known about the condition). Pt's HCP says pt has had noticeably increased work of breathing and could hear the wheezing for over the last 1-2 weeks. Pt used to be very independent before the fracture of her hip in Feb 2022, she lived alone and did all her ADLs by herself.    Vital Signs Last 24 Hrs  T(C): 36.4 (15 Mar 2022 15:59), Max: 36.7 (15 Mar 2022 12:30)  T(F): 97.5 (15 Mar 2022 15:59), Max: 98 (15 Mar 2022 12:30)  HR: 68 (15 Mar 2022 15:59) (68 - 89)  BP: 127/77 (15 Mar 2022 15:59) (116/76 - 127/77)  RR: 18 (15 Mar 2022 15:59) (18 - 18)  SpO2: 94% (15 Mar 2022 15:59) (90% - 94%)    P/E:  elderly, cachectic female, appear very dehydrated  Psych: AAO x3  Neuro: No gross focal deficits; Power and sensation intact  CVS: S1S2 present, regular, no edema  Resp: BLAE+but decreased bases Lt>Rt; , mild  wheeze b/l;   GI: Soft, BS+, Distended;  mild tenderness  Extr: No  calf tenderness B/L Lower extremities  Skin: Warm and moist without any rashes    Labs:                      11.3   61.56 )-----------( 471      ( 15 Mar 2022 13:54 )             35.5   03-15  132<L>  |  104  |  115<H>  ----------------------------<  161<H>  5.5<H>   |  17<L>  |  5.73<H>    Ca    8.6      15 Mar 2022 17:19  Phos  5.2     03-15  Mg     2.5     03-15  TPro  6.5  /  Alb  2.1<L>  /  TBili  0.5  /  DBili  x   /  AST  10  /  ALT  12  /  AlkPhos  127<H>  03-15    CT Abdomen and Pelvis No Cont (03.15.22 @ 17:00)     IMPRESSION: Cystic large pelvic mass with calcified septations is again identified. This appears significantly increased in size from the prior   study suggesting aggressive tumor. New bilateral hydronephrosis likely secondary to pelvic mass. Left-sided hydronephrosis and hydroureter is severe. It is moderate on the left.  Bilateral pleural effusions are new from the prior study. Left pleural effusion is large with loculation  New small right-sided pulmonary nodules may be infectious inflammatory versus neoplastic.  Since the prior study patient has undergone ORIF for left hip fracture    D/D:  Acute Respiratory failure with Hypoxia due to fluid retention due to   ABDON likely Obstructive Uropathy with severe B/L Hydronephrosis due to Bladder mass plus   likely ATN from possible Hypotension as well as Prerenal Azotemia due to poor oral intake  Hyperkalemia due to ABDON resolving  Leucocytosis reactive due to possible necrotic mass plus reactive due to severe dehydration  suspect UTI with resistant bacteria given multiple prior antibiotic use  Hemoconcentration (baseline Hgb 9)  Bladder mass likely Malignant   Age related physical debility worsened by recent fracture and immobility  Severe Protein Calorie malnutrition  Hx Breast Ca s/P Mastectomy    Plan:  Supplemental oxygen support  Indwelling Verduzco Catheter  IV Fluid hydration with D51/2 NS  Urology consult: Please inform Dr. Patel  IV Meropenem adjusted to Cr. Cl 500 mg q daily; ID consult d/w Dr. Taveras; as renal fn improve will need Meropenem dose adjusted  Repeat BMP tonight to monitor Potassium and renal fn; Expect Hgb to drop; CBC AM  Pain control: Dilauid 0.5mg q 4 hrs PRN also for respiratory support  GOC: DNR status patient confirms her wishes with HCP Trinidad at bedside  Mechanical soft diet with Ensure BID  Monitor kab; If no significant clinical improvement next 24 hrs Palliative consult Patient seen and examined.    89 year old female with history of Breast cancer s/p mastectomy, prior known bladder mass which patient refused work up on last admission , PSH of recent hip fracture s/p Left hip IM nailing, and ORIF of Left olecranon fracture, sent from the facility ([facility]) with concern for altered mental status and hypoxia. Patient is AAO X 3, but in visible distress and appear to be in pain. although denies any pain or fever.     Patient noted to be in ABDON with BUN/ Cr 124/6.5 and K 6.5; ICU consulted but given patient wishes to be kept comfortable and no intervention for bladder mass downgraded to Medicine. Patient has not been eating well and lost about likely 10-15lbs. Pt has been having worsening abdominal distension ever since she was diagnosed with the bladder mass, has been to a Urologist (Dr. Patel) who has been prescribing her antibiotics (however it seems that patient may have had the knowledge about her bladder mass longer than when her HCP had known about the condition). Pt's HCP says pt has had noticeably increased work of breathing and could hear the wheezing for over the last 1-2 weeks. Pt used to be very independent before the fracture of her hip in Feb 2022, she lived alone and did all her ADLs by herself.    Patient in visible mild to moderate distress possibly discomfort due to abdominal distension. does not offer any other complaints. Confirms DNR with  HCP at bedside. would want any treatment to help without aggressive intervention as per patient.     Vital Signs Last 24 Hrs  T(C): 36.4 (15 Mar 2022 15:59), Max: 36.7 (15 Mar 2022 12:30)  T(F): 97.5 (15 Mar 2022 15:59), Max: 98 (15 Mar 2022 12:30)  HR: 68 (15 Mar 2022 15:59) (68 - 89)  BP: 127/77 (15 Mar 2022 15:59) (116/76 - 127/77)  RR: 18 (15 Mar 2022 15:59) (18 - 18)  SpO2: 94% (15 Mar 2022 15:59) (90% - 94%)    P/E:  elderly, cachectic female, appear very dehydrated  Psych: AAO x3  Neuro: No gross focal deficits; Power and sensation intact  CVS: S1S2 present, regular, no edema  Resp: BLAE+ but decreased bases Lt>Rt; , mild  wheeze b/l;   GI: Soft, BS+, Distended;  mild tenderness  Extr: No  calf tenderness B/L Lower extremities  Skin: Warm and moist without any rashes    Labs:                      11.3   61.56 )-----------( 471      ( 15 Mar 2022 13:54 )             35.5   03-15  132<L>  |  104  |  115<H>  ----------------------------<  161<H>  5.5<H>   |  17<L>  |  5.73<H>    Ca    8.6      15 Mar 2022 17:19  Phos  5.2     03-15  Mg     2.5     03-15  TPro  6.5  /  Alb  2.1<L>  /  TBili  0.5  /  DBili  x   /  AST  10  /  ALT  12  /  AlkPhos  127<H>  03-15    CT Abdomen and Pelvis No Cont (03.15.22 @ 17:00)     IMPRESSION: Cystic large pelvic mass with calcified septations is again identified. This appears significantly increased in size from the prior   study suggesting aggressive tumor. New bilateral hydronephrosis likely secondary to pelvic mass. Left-sided hydronephrosis and hydroureter is severe. It is moderate on the left.  Bilateral pleural effusions are new from the prior study. Left pleural effusion is large with loculation  New small right-sided pulmonary nodules may be infectious inflammatory versus neoplastic.  Since the prior study patient has undergone ORIF for left hip fracture    D/D:  Acute Respiratory failure with Hypoxia due to fluid retention due to   ABDON likely Obstructive Uropathy with severe B/L Hydronephrosis due to Bladder mass plus   likely ATN from possible Hypotension as well as Prerenal Azotemia due to poor oral intake  Hyperkalemia due to ABDON resolving  Leucocytosis reactive due to possible necrotic mass plus reactive due to severe dehydration  suspect UTI with resistant bacteria given multiple prior antibiotic use  Hemoconcentration (baseline Hgb 9)  Bladder mass likely Malignant   Age related physical debility worsened by recent fracture and immobility  Severe Protein Calorie malnutrition  Hx Breast Ca s/P Mastectomy    Plan:  Supplemental oxygen support  Indwelling Verduzco Catheter  IV Fluid hydration with D51/2 NS  Urology consult: Please inform Dr. Patel  IV Meropenem adjusted to Cr. Cl 500 mg q daily; ID consult d/w Dr. Taveras; as renal fn improve will need Meropenem dose adjusted  Renal consult Dr. Rodriguez AM  Repeat BMP tonight to monitor Potassium and renal fn; Expect Hgb to drop; CBC AM  Pain control: Dilaudid 0.5mg q 4 hrs PRN also for respiratory support  GOC: DNR status patient confirms her wishes with HCP Trinidad at bedside  Mechanical soft diet with Ensure BID  Lokelma if Potassium persistently high  Monitor labs especially potassium; If no significant clinical improvement next 24 hrs Palliative consult    Discussed with Patient with HCP at bedside  Discussed with PGY2 SUZI Banks.    I will be away 3/16/22 -3/21/22; Hospitalist colleague to assume care Patient seen and examined.    89 year old female with history of Breast cancer s/p mastectomy, prior known bladder mass which patient refused work up on last admission , PSH of recent hip fracture s/p Left hip IM nailing, and ORIF of Left olecranon fracture, sent from the facility ([facility]) with concern for altered mental status and hypoxia. Patient is AAO X 3, but in visible distress and appear to be in pain. although denies any pain or fever.     Patient noted to be in ABDON with BUN/ Cr 124/6.5 and K 6.5; ICU consulted but given patient wishes to be kept comfortable and no intervention for bladder mass downgraded to Medicine. Patient has not been eating well and lost about likely 10-15lbs. Pt has been having worsening abdominal distension ever since she was diagnosed with the bladder mass, has been to a Urologist (Dr. Patel) who has been prescribing her antibiotics (however it seems that patient may have had the knowledge about her bladder mass longer than when her HCP had known about the condition). Pt's HCP says pt has had noticeably increased work of breathing and could hear the wheezing for over the last 1-2 weeks. Pt used to be very independent before the fracture of her hip in Feb 2022, she lived alone and did all her ADLs by herself.    Patient in visible mild to moderate distress possibly discomfort due to abdominal distension. does not offer any other complaints. Confirms DNR with  HCP at bedside. would want any treatment to help without aggressive intervention as per patient.     Vital Signs Last 24 Hrs  T(C): 36.4 (15 Mar 2022 15:59), Max: 36.7 (15 Mar 2022 12:30)  T(F): 97.5 (15 Mar 2022 15:59), Max: 98 (15 Mar 2022 12:30)  HR: 68 (15 Mar 2022 15:59) (68 - 89)  BP: 127/77 (15 Mar 2022 15:59) (116/76 - 127/77)  RR: 18 (15 Mar 2022 15:59) (18 - 18)  SpO2: 94% (15 Mar 2022 15:59) (90% - 94%)    P/E:  elderly, cachectic female, appear very dehydrated  Psych: AAO x3  Neuro: No gross focal deficits; Power and sensation intact  CVS: S1S2 present, regular, no edema  Resp: BLAE+ but decreased bases Lt>Rt; , mild  wheeze b/l;   GI: Soft, BS+, Distended;  mild tenderness  Extr: No  calf tenderness B/L Lower extremities  Skin: Warm and moist without any rashes    Labs:                      11.3   61.56 )-----------( 471      ( 15 Mar 2022 13:54 )             35.5   03-15  132<L>  |  104  |  115<H>  ----------------------------<  161<H>  5.5<H>   |  17<L>  |  5.73<H>    Ca    8.6      15 Mar 2022 17:19  Phos  5.2     03-15  Mg     2.5     03-15  TPro  6.5  /  Alb  2.1<L>  /  TBili  0.5  /  DBili  x   /  AST  10  /  ALT  12  /  AlkPhos  127<H>  03-15    CT Abdomen and Pelvis No Cont (03.15.22 @ 17:00)     IMPRESSION: Cystic large pelvic mass with calcified septations is again identified. This appears significantly increased in size from the prior   study suggesting aggressive tumor. New bilateral hydronephrosis likely secondary to pelvic mass. Left-sided hydronephrosis and hydroureter is severe. It is moderate on the left.  Bilateral pleural effusions are new from the prior study. Left pleural effusion is large with loculation  New small right-sided pulmonary nodules may be infectious inflammatory versus neoplastic.  Since the prior study patient has undergone ORIF for left hip fracture    D/D:  Acute Respiratory failure with Hypoxia due to fluid retention due to   ABDON likely Obstructive Uropathy with severe B/L Hydronephrosis due to Bladder mass plus   likely ATN from possible Hypotension as well as Prerenal Azotemia due to poor oral intake  Hyperkalemia due to ABDON resolving  Leucocytosis reactive due to possible necrotic mass plus reactive due to severe dehydration  suspect UTI with resistant bacteria given multiple prior antibiotic use  Hemoconcentration (baseline Hgb 9)  Bladder mass likely Malignant   Age related physical debility worsened by recent fracture and immobility  Severe Protein Calorie malnutrition  Hx Breast Ca s/P Mastectomy    Plan:  Supplemental oxygen support  Indwelling Verduzco Catheter  IV Fluid hydration with D51/2 NS  Urology consult: Please inform Dr. Patel  IV Meropenem adjusted to Cr. Cl 500 mg q daily; ID consult d/w Dr. Taveras; as renal fn improve will need Meropenem dose adjusted  Renal consult Dr. Rodriguez AM  Repeat BMP tonight to monitor Potassium and renal fn; Expect Hgb to drop; CBC AM  Pain control: Dilaudid 0.5mg q 4 hrs PRN also for respiratory support  GOC: DNR status patient confirms her wishes with HCP Trinidad at bedside  Mechanical soft diet with Ensure BID  Lokelma if Potassium persistently high; Hold Losartan due to ABDON and soft BP; No anti HTN for now; if elevated Amlodipine  Add on Uric acid to check Tumor lysis; if elevated give Rasburicase 6mg IV x once  Monitor labs especially potassium; If no significant clinical improvement next 24 hrs Palliative consult    Discussed with Patient with HCP at bedside  Discussed with PGY2 SUZI Banks.    I will be away 3/16/22 -3/21/22; Hospitalist colleague to assume care Patient seen and examined.    89 year old female with history of Breast cancer s/p mastectomy, prior known bladder mass which patient refused work up on last admission , PSH of recent hip fracture s/p Left hip IM nailing, and ORIF of Left olecranon fracture, sent from the facility ([facility]) with concern for altered mental status and hypoxia. Patient is AAO X 3, but in visible distress and appear to be in pain. although denies any pain or fever.     Patient noted to be in ABDON with BUN/ Cr 124/6.5 and K 6.5; ICU consulted but given patient wishes to be kept comfortable and no intervention for bladder mass downgraded to Medicine. Patient has not been eating well and lost about likely 10-15lbs. Pt has been having worsening abdominal distension ever since she was diagnosed with the bladder mass, has been to a Urologist (Dr. Patel) who has been prescribing her antibiotics (however it seems that patient may have had the knowledge about her bladder mass longer than when her HCP had known about the condition). Pt's HCP says pt has had noticeably increased work of breathing and could hear the wheezing for over the last 1-2 weeks. Pt used to be very independent before the fracture of her hip in Feb 2022, she lived alone and did all her ADLs by herself.    Patient in visible mild to moderate distress possibly discomfort due to abdominal distension. does not offer any other complaints. Confirms DNR with  HCP at bedside. would want any treatment to help without aggressive intervention as per patient.     Vital Signs Last 24 Hrs  T(C): 36.4 (15 Mar 2022 15:59), Max: 36.7 (15 Mar 2022 12:30)  T(F): 97.5 (15 Mar 2022 15:59), Max: 98 (15 Mar 2022 12:30)  HR: 68 (15 Mar 2022 15:59) (68 - 89)  BP: 127/77 (15 Mar 2022 15:59) (116/76 - 127/77)  RR: 18 (15 Mar 2022 15:59) (18 - 18)  SpO2: 94% (15 Mar 2022 15:59) (90% - 94%)    P/E:  elderly, cachectic female, appear very dehydrated  Psych: AAO x3  Neuro: No gross focal deficits; Power and sensation intact  CVS: S1S2 present, regular, no edema  Resp: BLAE+ but decreased bases Lt>Rt; , mild  wheeze b/l;   GI: Soft, BS+, Distended;  mild tenderness  Extr: No  calf tenderness B/L Lower extremities  Skin: Warm and moist without any rashes    Labs:                      11.3   61.56 )-----------( 471      ( 15 Mar 2022 13:54 )             35.5   03-15  132<L>  |  104  |  115<H>  ----------------------------<  161<H>  5.5<H>   |  17<L>  |  5.73<H>  Ca    8.6      15 Mar 2022 17:19  Phos  5.2     03-15  Mg     2.5     03-15  TPro  6.5  /  Alb  2.1<L>  /  TBili  0.5  /  DBili  x   /  AST  10  /  ALT  12  /  AlkPhos  127<H>  03-15    CT Abdomen and Pelvis No Cont (03.15.22 @ 17:00)     IMPRESSION: Cystic large pelvic mass with calcified septations is again identified. This appears significantly increased in size from the prior   study suggesting aggressive tumor. New bilateral hydronephrosis likely secondary to pelvic mass. Left-sided hydronephrosis and hydroureter is severe. It is moderate on the left.  Bilateral pleural effusions are new from the prior study. Left pleural effusion is large with loculation  New small right-sided pulmonary nodules may be infectious inflammatory versus neoplastic.  Since the prior study patient has undergone ORIF for left hip fracture    D/D:  Acute Respiratory failure with Hypoxia due to fluid retention due to   ABDON likely Obstructive Uropathy with severe B/L Hydronephrosis due to Bladder mass plus   likely ATN from possible Hypotension as well as Prerenal Azotemia due to poor oral intake  Hyperkalemia due to ABDON resolving  Leucocytosis reactive due to possible necrotic mass plus reactive due to severe dehydration  suspect UTI with resistant bacteria given multiple prior antibiotic use  Hemoconcentration (baseline Hgb 9)  Bladder mass likely Malignant   Age related physical debility worsened by recent fracture and immobility  Severe Protein Calorie malnutrition  Elevated D-dimer acute inflammatory marker non specific  Hx Breast Ca s/P Mastectomy    Plan:  Supplemental oxygen support  Indwelling Verduzco Catheter  IV Fluid hydration with D51/2 NS  Urology consult: Please inform Dr. Patel  IV Meropenem adjusted to Cr. Cl 500 mg q daily; ID consult d/w Dr. Taveras; as renal fn improve will need Meropenem dose adjusted  Renal consult Dr. Rodriguez AM  Repeat BMP tonight to monitor Potassium and renal fn; Expect Hgb to drop; CBC AM  Pain control: Dilaudid 0.5mg q 4 hrs PRN also for respiratory support  GOC: DNR status patient confirms her wishes with HCP Trinidad at bedside  Mechanical soft diet with Ensure BID  Lokelma if Potassium persistently high; Hold Losartan due to ABDON and soft BP; No anti HTN for now; if elevated Amlodipine  Add on Uric acid to check Tumor lysis; if elevated give Rasburicase 6mg IV x once  Monitor labs especially potassium; If no significant clinical improvement next 24 hrs Palliative consult    Discussed with Patient with HCP at bedside  Discussed with PGY2 SUZI Banks.    I will be away 3/16/22 -3/21/22; Hospitalist colleague to assume care

## 2022-03-15 NOTE — H&P ADULT - NSHPPHYSICALEXAM_GEN_ALL_CORE
PHYSICAL EXAM:  GENERAL: Cachetic female sitting upright, in mild distress given WOB  HEAD:  Atraumatic, Normocephalic  EYES: EOMI, PERRLA, conjunctiva and sclera clear  NECK: Supple, No JVD  CHEST/LUNG: Crackles bilaterally, increased more in the left side with mild wheezing   HEART: Regular rate and rhythm; S1, S2+  ABDOMEN: Distended, diffuse tenderness+  EXTREMITIES: No clubbing, cyanosis, or edema  NEUROLOGY: AAOx3 non-focal  SKIN: No rashes or lesions

## 2022-03-15 NOTE — H&P ADULT - PARTICIPANTS
Patient Discussed with patient and patient's HCP at bedside. Patient made the decision for no CPR/resuscitation and no intubation. DNR/DNI, MoLST signed by ICU team and Dr. Rojas ED Attending/Patient

## 2022-03-15 NOTE — H&P ADULT - PROBLEM SELECTOR PLAN 3
Pt p/w BUN/Cr 124/6.36 with hyperkalemia 6.3>>Albuterol neb x 3, Lokelma 10, Insulin reg 10 units with D50>>repeat potassium 5.5  C/w Lokelma 10 L2psfep x 48 hours

## 2022-03-16 NOTE — CONSULT NOTE ADULT - PROBLEM SELECTOR RECOMMENDATION 2
Remote h/o breast cancer s/p left mastectomy. CT abd and pelvis: Cystic large pelvic mass with calcified septations is again identified. This appears significantly increased in size from the prior study suggesting aggressive tumor.      Pt refused work up or immunotherapy on prior admission. Remote h/o breast cancer s/p left mastectomy. W/ obstructive uropathy with hydro     CT abd and pelvis: Cystic large pelvic mass with calcified septations is again identified. This appears significantly increased in size from the prior study suggesting aggressive tumor.     Pt refused work up or immunotherapy on prior admission. W/ obstructive uropathy with b/l hydroureteronephrosis. Remote h/o breast cancer s/p left mastectomy.     CT abd and pelvis: Cystic large pelvic mass with calcified septations is again identified. This appears significantly increased in size from the prior study suggesting aggressive tumor.     Pt refused work up or immunotherapy on prior admission.

## 2022-03-16 NOTE — CONSULT NOTE ADULT - SUBJECTIVE AND OBJECTIVE BOX
[facility] Geriatric and Palliative Consult Service:  Dr. Jessica Burgess: cell ([phone])  Dr. Meghann Rivera: cell ([phone])   Radha Mistry NP: cell ([phone])  Mansi Arango NP: cell ([phone])   Checo Mary LSW: cell ([phone])     HPI:  Patient is a 89 y/o Female, from [facility], with medical hx significant for HTN, Bladder mass, Breast cancer s/p Mastectomy, Hx fracture, S/p Left hip IM nailing, and ORIF of Left olecranon fracture, sent from the facility with concern for altered mental status and hypoxia. Pt says she is fine and she does not have any breathing issues, abdominal pain, dysuria, chest pain, nausea, vomiting, diarrhea, numbness, weakness. However she was noted to be in moderate level of pain when I was interviewing her with her HCP at bedside when she was attempting to void. HCP at bedside Trinidad Brian endorses that ever since pt was moved to Rehab after her fracture repair, she has not been eating well and lost about likely 10-15lbs. Her ambulation has been minimal given hip fracture repair with regular PT sessions. Pt has been having worsening abdominal distension ever since she was diagnosed with the bladder mass, has been to a Urologist who has been prescribing her antibiotics (however it seems that patient may have had the knowledge about her bladder mass longer than when her HCP had known about the condition). Pt's HCP says pt has had noticeably increased WOB and could hear the wheezing for over the last 1-2 weeks. Pt used to be very independent before the fracture of her hip in 2022, she lived alone and did all her ADLs by herself.      CT chest showing bilateral pleural effusions, left pleural effusion large with loculation  CT abdomen and pelvis shows Large pelvic mass with new bilateral HUN, left sided hydronephrosis and severe hydroureter        PAST MEDICAL & SURGICAL HISTORY:  Hypertension    Fracture of hip  S/p Left hip IM nailing    Olecranon fracture  S/p ORIF    Breast cancer  s/p Mastectomy    No significant past surgical history        SOCIAL HISTORY:    Admitted from:  home  (with HHA)           assisted living          [facility]       [facility]   Substance abuse history:              Tobacco hx:                  Alcohol hx:              Home Opioid hx:  Sabianism:                                    Preferred Language:    FAMILY HISTORY:   unable to obtain from patient due to poor mentation, family unable to give information  Baseline ADLs (prior to admission):    Allergies    No Known Allergies    Intolerances      Present Symptoms:   Pain:  Fatigue:  Nausea:  Lack of Appetite:   SOB:  Depression:  Anxiety:  Review of Systems: [All others negative or Unable to obtain due to poor mentation]    MEDICATIONS  (STANDING):  calcium gluconate IVPB 2 Gram(s) IV Intermittent once  dextrose 5% + sodium chloride 0.45%. 1000 milliLiter(s) (75 mL/Hr) IV Continuous <Continuous>  dextrose 50% Injectable 50 milliLiter(s) IV Push once  heparin   Injectable 5000 Unit(s) SubCutaneous every 12 hours  insulin regular  human recombinant 10 Unit(s) IV Push once  meropenem  IVPB      meropenem  IVPB 500 milliGRAM(s) IV Intermittent every 24 hours  rasburicase IVPB 6 milliGRAM(s) IV Intermittent once  sodium zirconium cyclosilicate 10 Gram(s) Oral every 8 hours  tiotropium 18 MICROgram(s) Capsule 1 Capsule(s) Inhalation daily    MEDICATIONS  (PRN):  ALBUTerol    90 MICROgram(s) HFA Inhaler 2 Puff(s) Inhalation every 6 hours PRN Shortness of Breath and/or Wheezing  HYDROmorphone  Injectable 0.5 milliGRAM(s) IV Push every 4 hours PRN Resp distress and/or Pain      PHYSICAL EXAM:  Vital Signs Last 24 Hrs  T(C): 36.7 (16 Mar 2022 05:15), Max: 36.7 (15 Mar 2022 12:30)  T(F): 98 (16 Mar 2022 05:15), Max: 98 (15 Mar 2022 12:30)  HR: 84 (16 Mar 2022 05:15) (68 - 89)  BP: 93/56 (16 Mar 2022 05:15) (93/56 - 127/77)  BP(mean): --  RR: 17 (16 Mar 2022 05:15) (16 - 18)  SpO2: 96% (16 Mar 2022 05:15) (90% - 100%)    General: alert  oriented x ____    lethargic distressed cachexia  nonverbal  unarousable verbal    Palliative Performance Scale/Karnofsky Score:  ECOG Performance:    HEENT: no abnormal lesion, dry mouth  ET tube/trach oral lesions:  Lungs: tachypnea/labored breathing  excessive secretions  CV: RRR, S1S2, tachycardia  GI: soft non distended non tender  incontinent               PEG/NG/OG tube  constipation  last BM:   : incontinent  oliguria/anuria  jalloh  Musculoskeletal: weakness  edema   ambulatory with assistance    bedbound/wheelchair bound  Skin: no abnormal skin lesions, poor skin turgor, pressure ulcer stage:   Neuro: no deficits, cognitive impairment dsyphagia/dysarthria paresis  Oral intake ability: unable/only mouth care, minimal moderate full capability    LABS:                        11.3   61.56 )-----------( 471      ( 15 Mar 2022 13:54 )             35.5     03-16    134<L>  |  107  |  98<H>  ----------------------------<  108<H>  6.6<HH>   |  14<L>  |  5.24<H>    Ca    8.0<L>      16 Mar 2022 07:22  Phos  5.5     03-16  Mg     2.4     -16    TPro  5.4<L>  /  Alb  1.5<L>  /  TBili  0.4  /  DBili  x   /  AST  22  /  ALT  9<L>  /  AlkPhos  129<H>  03-16    Urinalysis Basic - ( 15 Mar 2022 14:32 )    Color: Ivette / Appearance: very cloudy / S.010 / pH: x  Gluc: x / Ketone: Negative  / Bili: Negative / Urobili: 1   Blood: x / Protein: 500 mg/dL / Nitrite: Negative   Leuk Esterase: Moderate / RBC: 2-5 /HPF / WBC 26-50 /HPF   Sq Epi: x / Non Sq Epi: Occasional /HPF / Bacteria: x        RADIOLOGY & ADDITIONAL STUDIES:         [facility] Geriatric and Palliative Consult Service:  Dr. Jessica Burgess: cell ([phone])  Dr. Meghann Rivera: cell ([phone])   Radha Mistry NP: cell ([phone])  Mansi Arango NP: cell ([phone])   Checo Morangabrielle LSW: cell ([phone])     HPI:  Patient is a 89 y/o Female, from [facility], with medical hx significant for HTN, Bladder mass, Breast cancer s/p Mastectomy, Hx fracture, S/p Left hip IM nailing, and ORIF of Left olecranon fracture, sent from the facility with concern for altered mental status and hypoxia. Pt says she is fine and she does not have any breathing issues, abdominal pain, dysuria, chest pain, nausea, vomiting, diarrhea, numbness, weakness. However she was noted to be in moderate level of pain when I was interviewing her with her HCP at bedside when she was attempting to void. HCP at bedside Trinidad Brian endorses that ever since pt was moved to Rehab after her fracture repair, she has not been eating well and lost about likely 10-15lbs. Her ambulation has been minimal given hip fracture repair with regular PT sessions. Pt has been having worsening abdominal distension ever since she was diagnosed with the bladder mass, has been to a Urologist who has been prescribing her antibiotics (however it seems that patient may have had the knowledge about her bladder mass longer than when her HCP had known about the condition). Pt's HCP says pt has had noticeably increased WOB and could hear the wheezing for over the last 1-2 weeks. Pt used to be very independent before the fracture of her hip in 2022, she lived alone and did all her ADLs by herself.      Interval hx:  Pt seen and examined at the bedside, Aox1.  Noted with IWOB on nc.  CT chest showing bilateral pleural effusions, left pleural effusion large with loculation  CT abdomen and pelvis shows Large pelvic mass with new bilateral HUN, left sided hydronephrosis and severe hydroureter        PAST MEDICAL & SURGICAL HISTORY:  Hypertension    Fracture of hip  S/p Left hip IM nailing    Olecranon fracture  S/p ORIF    Breast cancer  s/p Mastectomy    No significant past surgical history        SOCIAL HISTORY:    Admitted from:  home  (with HHA)           assisted living          [facility]   Substance abuse history:              Tobacco hx:                  Alcohol hx:              Home Opioid hx:  Voodoo:                                    Preferred Language:    FAMILY HISTORY:   unable to obtain from patient due to poor mentation, family unable to give information  Baseline ADLs (prior to admission):    Allergies    No Known Allergies    Intolerances      Present Symptoms:   Unable to obtain due to poor mentation    MEDICATIONS  (STANDING):  calcium gluconate IVPB 2 Gram(s) IV Intermittent once  dextrose 5% + sodium chloride 0.45%. 1000 milliLiter(s) (75 mL/Hr) IV Continuous <Continuous>  dextrose 50% Injectable 50 milliLiter(s) IV Push once  heparin   Injectable 5000 Unit(s) SubCutaneous every 12 hours  insulin regular  human recombinant 10 Unit(s) IV Push once  meropenem  IVPB      meropenem  IVPB 500 milliGRAM(s) IV Intermittent every 24 hours  rasburicase IVPB 6 milliGRAM(s) IV Intermittent once  sodium zirconium cyclosilicate 10 Gram(s) Oral every 8 hours  tiotropium 18 MICROgram(s) Capsule 1 Capsule(s) Inhalation daily    MEDICATIONS  (PRN):  ALBUTerol    90 MICROgram(s) HFA Inhaler 2 Puff(s) Inhalation every 6 hours PRN Shortness of Breath and/or Wheezing  HYDROmorphone  Injectable 0.5 milliGRAM(s) IV Push every 4 hours PRN Resp distress and/or Pain      PHYSICAL EXAM:  Vital Signs Last 24 Hrs  T(C): 36.7 (16 Mar 2022 05:15), Max: 36.7 (15 Mar 2022 12:30)  T(F): 98 (16 Mar 2022 05:15), Max: 98 (15 Mar 2022 12:30)  HR: 84 (16 Mar 2022 05:15) (68 - 89)  BP: 93/56 (16 Mar 2022 05:15) (93/56 - 127/77)  BP(mean): --  RR: 17 (16 Mar 2022 05:15) (16 - 18)  SpO2: 96% (16 Mar 2022 05:15) (90% - 100%)    General: chronically ill appearing elderly woman, AOX1.  IWOB on NC    Palliative Performance Scale/Karnofsky Score: 30%  ECOG Performance: n/a    HEENT: temporal wasting, clear oropharynx, neck supple  Lungs: diminished bases, dyspneic on NC  CV: RRR, S1S2  GI: soft non distended non tender  incontinent  : jalloh   Musculoskeletal: bedbound, no edema  Skin: no abnormal skin lesions, poor skin turgor  Neuro: able to follow commands  Oral intake ability: poor po intake    LABS:                        11.3   61.56 )-----------( 471      ( 15 Mar 2022 13:54 )             35.5     03-16    134<L>  |  107  |  98<H>  ----------------------------<  108<H>  6.6<HH>   |  14<L>  |  5.24<H>    Ca    8.0<L>      16 Mar 2022 07:22  Phos  5.5     -  Mg     2.4     -    TPro  5.4<L>  /  Alb  1.5<L>  /  TBili  0.4  /  DBili  x   /  AST  22  /  ALT  9<L>  /  AlkPhos  129<H>  -    Urinalysis Basic - ( 15 Mar 2022 14:32 )    Color: Ivette / Appearance: very cloudy / S.010 / pH: x  Gluc: x / Ketone: Negative  / Bili: Negative / Urobili: 1   Blood: x / Protein: 500 mg/dL / Nitrite: Negative   Leuk Esterase: Moderate / RBC: 2-5 /HPF / WBC 26-50 /HPF   Sq Epi: x / Non Sq Epi: Occasional /HPF / Bacteria: x      RADIOLOGY & ADDITIONAL STUDIES: reviewed  ADVANCED DIRECTIVES:  DNR/DNI         [facility] Geriatric and Palliative Consult Service:  Dr. Jessica Burgess: cell ([phone])  Dr. Meghann Rivera: cell ([phone])   Radha Mistry NP: cell ([phone])  Mansi Arango NP: cell ([phone])   Checo Morangabrielle LSW: cell ([phone])     HPI:  Patient is a 89 y/o Female, from [facility], with medical hx significant for HTN, Bladder mass, Breast cancer s/p Mastectomy, Hx fracture, S/p Left hip IM nailing, and ORIF of Left olecranon fracture, sent from the facility with concern for altered mental status and hypoxia. Pt says she is fine and she does not have any breathing issues, abdominal pain, dysuria, chest pain, nausea, vomiting, diarrhea, numbness, weakness. However she was noted to be in moderate level of pain when I was interviewing her with her HCP at bedside when she was attempting to void. HCP at bedside Trinidad Brian endorses that ever since pt was moved to Rehab after her fracture repair, she has not been eating well and lost about likely 10-15lbs. Her ambulation has been minimal given hip fracture repair with regular PT sessions. Pt has been having worsening abdominal distension ever since she was diagnosed with the bladder mass, has been to a Urologist who has been prescribing her antibiotics (however it seems that patient may have had the knowledge about her bladder mass longer than when her HCP had known about the condition). Pt's HCP says pt has had noticeably increased WOB and could hear the wheezing for over the last 1-2 weeks. Pt used to be very independent before the fracture of her hip in 2022, she lived alone and did all her ADLs by herself.      Interval hx:  Pt seen and examined at the bedside, Aox1.  Noted with IWOB on NC.  CT chest showing bilateral pleural effusions, left pleural effusion large with loculation  CT abdomen and pelvis shows Large pelvic mass with new bilateral HUN, left sided hydronephrosis and severe hydroureter        PAST MEDICAL & SURGICAL HISTORY:  Hypertension    Fracture of hip  S/p Left hip IM nailing    Olecranon fracture  S/p ORIF    Breast cancer  s/p Mastectomy    No significant past surgical history        SOCIAL HISTORY:    Admitted from:  [facility]  Pt's neighbor Trinidad Brian is her assigned HCP  Substance abuse history:   none           Tobacco hx:   none               Alcohol hx: none             Home Opioid hx: none  Confucianist:    Shinto                              Preferred Language: English      Trinidad Brian  (HCP)  Phone# [phone]    FAMILY HISTORY:   unable to obtain from patient due to poor mentation  Baseline ADLs (prior to admission): dependent    Allergies    No Known Allergies    Intolerances      Present Symptoms:   Unable to obtain due to poor mentation    MEDICATIONS  (STANDING):  calcium gluconate IVPB 2 Gram(s) IV Intermittent once  dextrose 5% + sodium chloride 0.45%. 1000 milliLiter(s) (75 mL/Hr) IV Continuous <Continuous>  dextrose 50% Injectable 50 milliLiter(s) IV Push once  heparin   Injectable 5000 Unit(s) SubCutaneous every 12 hours  insulin regular  human recombinant 10 Unit(s) IV Push once  meropenem  IVPB      meropenem  IVPB 500 milliGRAM(s) IV Intermittent every 24 hours  rasburicase IVPB 6 milliGRAM(s) IV Intermittent once  sodium zirconium cyclosilicate 10 Gram(s) Oral every 8 hours  tiotropium 18 MICROgram(s) Capsule 1 Capsule(s) Inhalation daily    MEDICATIONS  (PRN):  ALBUTerol    90 MICROgram(s) HFA Inhaler 2 Puff(s) Inhalation every 6 hours PRN Shortness of Breath and/or Wheezing  HYDROmorphone  Injectable 0.5 milliGRAM(s) IV Push every 4 hours PRN Resp distress and/or Pain      PHYSICAL EXAM:  Vital Signs Last 24 Hrs  T(C): 36.7 (16 Mar 2022 05:15), Max: 36.7 (15 Mar 2022 12:30)  T(F): 98 (16 Mar 2022 05:15), Max: 98 (15 Mar 2022 12:30)  HR: 84 (16 Mar 2022 05:15) (68 - 89)  BP: 93/56 (16 Mar 2022 05:15) (93/56 - 127/77)  BP(mean): --  RR: 17 (16 Mar 2022 05:15) (16 - 18)  SpO2: 96% (16 Mar 2022 05:15) (90% - 100%)    General: chronically ill appearing elderly woman, lethargic, AOX1.  IWOB on NC    Palliative Performance Scale/Karnofsky Score: 30%  ECOG Performance: n/a    HEENT: temporal wasting, clear oropharynx, neck supple  Lungs: diminished bases, + wheezing,  dyspneic on NC  CV: RRR, S1S2  GI: soft non distended non tender on palpation  incontinent  : jalloh   Musculoskeletal: bedbound, no edema  Skin: no abnormal skin lesions, poor skin turgor  Neuro: able to follow simple commands  Oral intake ability: poor po intake    LABS:                        11.3   61.56 )-----------( 471      ( 15 Mar 2022 13:54 )             35.5     03-16    134<L>  |  107  |  98<H>  ----------------------------<  108<H>  6.6<HH>   |  14<L>  |  5.24<H>    Ca    8.0<L>      16 Mar 2022 07:22  Phos  5.5     03-16  Mg     2.4     -16    TPro  5.4<L>  /  Alb  1.5<L>  /  TBili  0.4  /  DBili  x   /  AST  22  /  ALT  9<L>  /  AlkPhos  129<H>  -16    Urinalysis Basic - ( 15 Mar 2022 14:32 )    Color: Ivette / Appearance: very cloudy / S.010 / pH: x  Gluc: x / Ketone: Negative  / Bili: Negative / Urobili: 1   Blood: x / Protein: 500 mg/dL / Nitrite: Negative   Leuk Esterase: Moderate / RBC: 2-5 /HPF / WBC 26-50 /HPF   Sq Epi: x / Non Sq Epi: Occasional /HPF / Bacteria: x    < from: CT Chest No Cont (03.15.22 @ 17:00) >  ACC: [id number] EXAM:  CT ABDOMEN AND PELVIS                        ACC: [id number] EXAM:  CT CHEST                          PROCEDURE DATE:  03/15/2022          INTERPRETATION:  CLINICAL INFORMATION: Shortness of breath and abnormal   chest x-ray. Breast cancer status post mastectomy and renal failure    COMPARISON: CT abdomen pelvis of 2022 and CT chest abdomen pelvis of   2/3/2022 chest x-ray of earlier in the day    CONTRAST/COMPLICATIONS:  IV Contrast: NONE  Oral Contrast: NONE  Complications: None reported at time of study completion    PROCEDURE:  CT of the Chest, Abdomen and Pelvis was performed.  Sagittal and coronal reformats were performed.    FINDINGS:  CHEST:  LUNGS AND LARGE AIRWAYS: Patent central airways. There are scattered  small right pulmonary nodules in the upper lobe on image 33 of series 2   and in the right middle lobe on image 85 are likely infectious   inflammatory. Not exclude neoplastic etiology.  PLEURA: Large left pleural effusion with extension into the fissure and   some loculation. Small right pleural effusion extends into the major   fissure.  VESSELS: Atherosclerotic changes of the aorta and coronary arteries.  HEART: Heart size is normal. No pericardial effusion.  MEDIASTINUM AND ZOILA: No lymphadenopathy.  CHEST WALL AND LOWER NECK: Left mastectomy    ABDOMEN AND PELVIS:  LIVER: Within normal limits.  BILE DUCTS: Normal caliber.  GALLBLADDER: Within normal limits.  SPLEEN: Coarsely peripherally calcified splenic cyst again noted.  PANCREAS: Within normal limits.  ADRENALS: Within normal limits.  KIDNEYS/URETERS: There is new severe right hydronephrosis and hydroureter   likely secondary to the large pelvic mass. There is new moderate left   hydronephrosis as well.    BLADDER: Jalloh catheter. Bladder is inseparable from the pelvic mass   which may arise from the bladder is partially based on the prior   contrast-enhanced study  REPRODUCTIVE ORGANS: The uterus is not identified. There is again a   complex cystic mass with calcified septations in the pelvis extending to   lower abdomen measuring 13.5 cm AP by 10.9 cm transverse on image 15 of   series 2    BOWEL: No bowel obstruction. Appendix is not visualized. No evidence of   inflammation in the pericecal region. There is diverticulosis without   diverticulitis  PERITONEUM: No ascites.  VESSELS: Atherosclerotic changes.  RETROPERITONEUM/LYMPH NODES: No lymphadenopathy.  ABDOMINAL WALL: Within normal limits.  BONES: Osteopenia and degenerative changes. ORIF of the left hip for   subacute fracture which was acute on the prior imaging study. Compression   fractures are again seen with the most severe fracture at T12. There is   also mild compression of L2 and L3 again noted. There are also moderate   to severe mid thoracic compression fracture    IMPRESSION: Cystic large pelvic mass with calcified septations is again   identified. This appears significantly increased in size from the prior   study suggesting aggressive tumor.  New bilateral hydronephrosis likely secondaryto pelvic mass. Left-sided   hydronephrosis and hydroureter is severe. It is moderate on the left.  Bilateral pleural effusions are new from the prior study. Left pleural   effusion is large with loculation  New small right-sided pulmonary nodules may be infectious inflammatory   versus neoplastic.  Since the prior study patient has undergone ORIF for left hip fracture    < end of copied text >    RADIOLOGY & ADDITIONAL STUDIES: reviewed  ADVANCED DIRECTIVES:  DNR/DNI         [facility] Geriatric and Palliative Consult Service:  Dr. Jessica Burgess: cell ([phone])  Dr. Meghann Rivera: cell ([phone])   Radha Mistry NP: cell ([phone])  Mansi Arango NP: cell ([phone])   Checo Morangabrielle LSW: cell ([phone])     HPI:  Patient is a 89 y/o Female, from [facility], with medical hx significant for HTN, Bladder mass, Breast cancer s/p Mastectomy, Hx fracture, S/p Left hip IM nailing, and ORIF of Left olecranon fracture, sent from the facility with concern for altered mental status and hypoxia. Pt says she is fine and she does not have any breathing issues, abdominal pain, dysuria, chest pain, nausea, vomiting, diarrhea, numbness, weakness. However she was noted to be in moderate level of pain when I was interviewing her with her HCP at bedside when she was attempting to void. HCP at bedside Trinidad Brain endorses that ever since pt was moved to Rehab after her fracture repair, she has not been eating well and lost about likely 10-15lbs. Her ambulation has been minimal given hip fracture repair with regular PT sessions. Pt has been having worsening abdominal distension ever since she was diagnosed with the bladder mass, has been to a Urologist who has been prescribing her antibiotics (however it seems that patient may have had the knowledge about her bladder mass longer than when her HCP had known about the condition). Pt's HCP says pt has had noticeably increased WOB and could hear the wheezing for over the last 1-2 weeks. Pt used to be very independent before the fracture of her hip in 2022, she lived alone and did all her ADLs by herself.      Interval hx:  Pt seen and examined at the bedside, Aox1.  Noted with IWOB on NC.  CT chest showing bilateral pleural effusions, left pleural effusion large with loculation  CT abdomen and pelvis shows Large pelvic mass with new bilateral HUN, left sided hydronephrosis and severe hydroureter.         PAST MEDICAL & SURGICAL HISTORY:  Hypertension    Fracture of hip  S/p Left hip IM nailing    Olecranon fracture  S/p ORIF    Breast cancer  s/p Mastectomy    No significant past surgical history        SOCIAL HISTORY:    Admitted from:  [facility]  Pt's neighbor Trinidad Brian is her assigned HCP  Substance abuse history:   none           Tobacco hx:   none               Alcohol hx: none             Home Opioid hx: none  Cheondoism:    Restoration                              Preferred Language: English      Trinidad Brian  (HCP)  Phone# [phone]    FAMILY HISTORY:   unable to obtain from patient due to poor mentation  Baseline ADLs (prior to admission): dependent    Allergies    No Known Allergies    Intolerances      Present Symptoms:   Unable to obtain due to poor mentation    MEDICATIONS  (STANDING):  calcium gluconate IVPB 2 Gram(s) IV Intermittent once  dextrose 5% + sodium chloride 0.45%. 1000 milliLiter(s) (75 mL/Hr) IV Continuous <Continuous>  dextrose 50% Injectable 50 milliLiter(s) IV Push once  heparin   Injectable 5000 Unit(s) SubCutaneous every 12 hours  insulin regular  human recombinant 10 Unit(s) IV Push once  meropenem  IVPB      meropenem  IVPB 500 milliGRAM(s) IV Intermittent every 24 hours  rasburicase IVPB 6 milliGRAM(s) IV Intermittent once  sodium zirconium cyclosilicate 10 Gram(s) Oral every 8 hours  tiotropium 18 MICROgram(s) Capsule 1 Capsule(s) Inhalation daily    MEDICATIONS  (PRN):  ALBUTerol    90 MICROgram(s) HFA Inhaler 2 Puff(s) Inhalation every 6 hours PRN Shortness of Breath and/or Wheezing  HYDROmorphone  Injectable 0.5 milliGRAM(s) IV Push every 4 hours PRN Resp distress and/or Pain      PHYSICAL EXAM:  Vital Signs Last 24 Hrs  T(C): 36.7 (16 Mar 2022 05:15), Max: 36.7 (15 Mar 2022 12:30)  T(F): 98 (16 Mar 2022 05:15), Max: 98 (15 Mar 2022 12:30)  HR: 84 (16 Mar 2022 05:15) (68 - 89)  BP: 93/56 (16 Mar 2022 05:15) (93/56 - 127/77)  BP(mean): --  RR: 17 (16 Mar 2022 05:15) (16 - 18)  SpO2: 96% (16 Mar 2022 05:15) (90% - 100%)    General: chronically ill appearing elderly woman, lethargic, AOX1.  IWOB on NC    Palliative Performance Scale/Karnofsky Score: 30%  ECOG Performance: n/a    HEENT: temporal wasting, clear oropharynx, neck supple  Lungs: diminished bases, + wheezing,  dyspneic on NC  CV: RRR, S1S2  GI: soft non distended non tender on palpation  incontinent  : jalloh   Musculoskeletal: bedbound, no edema  Skin: no abnormal skin lesions, poor skin turgor  Neuro: able to follow simple commands  Oral intake ability: poor po intake    LABS:                        11.3   61.56 )-----------( 471      ( 15 Mar 2022 13:54 )             35.5     03-16    134<L>  |  107  |  98<H>  ----------------------------<  108<H>  6.6<HH>   |  14<L>  |  5.24<H>    Ca    8.0<L>      16 Mar 2022 07:22  Phos  5.5     03-16  Mg     2.4     -16    TPro  5.4<L>  /  Alb  1.5<L>  /  TBili  0.4  /  DBili  x   /  AST  22  /  ALT  9<L>  /  AlkPhos  129<H>  03-16    Urinalysis Basic - ( 15 Mar 2022 14:32 )    Color: Ivette / Appearance: very cloudy / S.010 / pH: x  Gluc: x / Ketone: Negative  / Bili: Negative / Urobili: 1   Blood: x / Protein: 500 mg/dL / Nitrite: Negative   Leuk Esterase: Moderate / RBC: 2-5 /HPF / WBC 26-50 /HPF   Sq Epi: x / Non Sq Epi: Occasional /HPF / Bacteria: x    < from: CT Chest No Cont (03.15.22 @ 17:00) >  ACC: [id number] EXAM:  CT ABDOMEN AND PELVIS                        ACC: [id number] EXAM:  CT CHEST                          PROCEDURE DATE:  03/15/2022          INTERPRETATION:  CLINICAL INFORMATION: Shortness of breath and abnormal   chest x-ray. Breast cancer status post mastectomy and renal failure    COMPARISON: CT abdomen pelvis of 2022 and CT chest abdomen pelvis of   2/3/2022 chest x-ray of earlier in the day    CONTRAST/COMPLICATIONS:  IV Contrast: NONE  Oral Contrast: NONE  Complications: None reported at time of study completion    PROCEDURE:  CT of the Chest, Abdomen and Pelvis was performed.  Sagittal and coronal reformats were performed.    FINDINGS:  CHEST:  LUNGS AND LARGE AIRWAYS: Patent central airways. There are scattered  small right pulmonary nodules in the upper lobe on image 33 of series 2   and in the right middle lobe on image 85 are likely infectious   inflammatory. Not exclude neoplastic etiology.  PLEURA: Large left pleural effusion with extension into the fissure and   some loculation. Small right pleural effusion extends into the major   fissure.  VESSELS: Atherosclerotic changes of the aorta and coronary arteries.  HEART: Heart size is normal. No pericardial effusion.  MEDIASTINUM AND ZOILA: No lymphadenopathy.  CHEST WALL AND LOWER NECK: Left mastectomy    ABDOMEN AND PELVIS:  LIVER: Within normal limits.  BILE DUCTS: Normal caliber.  GALLBLADDER: Within normal limits.  SPLEEN: Coarsely peripherally calcified splenic cyst again noted.  PANCREAS: Within normal limits.  ADRENALS: Within normal limits.  KIDNEYS/URETERS: There is new severe right hydronephrosis and hydroureter   likely secondary to the large pelvic mass. There is new moderate left   hydronephrosis as well.    BLADDER: Jalloh catheter. Bladder is inseparable from the pelvic mass   which may arise from the bladder is partially based on the prior   contrast-enhanced study  REPRODUCTIVE ORGANS: The uterus is not identified. There is again a   complex cystic mass with calcified septations in the pelvis extending to   lower abdomen measuring 13.5 cm AP by 10.9 cm transverse on image 15 of   series 2    BOWEL: No bowel obstruction. Appendix is not visualized. No evidence of   inflammation in the pericecal region. There is diverticulosis without   diverticulitis  PERITONEUM: No ascites.  VESSELS: Atherosclerotic changes.  RETROPERITONEUM/LYMPH NODES: No lymphadenopathy.  ABDOMINAL WALL: Within normal limits.  BONES: Osteopenia and degenerative changes. ORIF of the left hip for   subacute fracture which was acute on the prior imaging study. Compression   fractures are again seen with the most severe fracture at T12. There is   also mild compression of L2 and L3 again noted. There are also moderate   to severe mid thoracic compression fracture    IMPRESSION: Cystic large pelvic mass with calcified septations is again   identified. This appears significantly increased in size from the prior   study suggesting aggressive tumor.  New bilateral hydronephrosis likely secondaryto pelvic mass. Left-sided   hydronephrosis and hydroureter is severe. It is moderate on the left.  Bilateral pleural effusions are new from the prior study. Left pleural   effusion is large with loculation  New small right-sided pulmonary nodules may be infectious inflammatory   versus neoplastic.  Since the prior study patient has undergone ORIF for left hip fracture    < end of copied text >    RADIOLOGY & ADDITIONAL STUDIES: reviewed  ADVANCED DIRECTIVES:  DNR/DNI

## 2022-03-16 NOTE — PROGRESS NOTE ADULT - ATTENDING COMMENTS
a/p# Acute respiratory failure w/ hypoxia # Severe protein calorie energy malnutrition # Hyperkalemia # ABDON # Hyperuricemia # Possible bladder cancer # Severe protein calorie energy malnutrition    -patient was in respiratory distress in the morning during exam with wheezing and using accessory muscles, given worsening status and extensive co-morbidities, family meeting was done and patient is made comfort care. will be transitioned in-patient hospice  - no labs, dc atbx  -comfort measures

## 2022-03-16 NOTE — CONSULT NOTE ADULT - PROBLEM SELECTOR RECOMMENDATION 6
Pt appears to be actively dying in respiratory distress. Spoke with her HCP Trinidad Brian via phone and updated her as to the pt's decline. Explained the pt is appropriate for inpatient hospice for symptom management and EOL care.   Ms. Brian stated it is against her belief to stop treatment or not seek treatments, she needs to see the pt before making any decisions about further goals of care.  Palliative care will follow. Pt appears to be actively dying in respiratory distress. Spoke with her HCP Trinidad Brian via phone and updated her as to the pt's decline. Explained the pt is appropriate for inpatient hospice for symptom management and EOL care.   Ms. Brian stated it is against her belief to stop treatment or not seek treatments, she needs to see the pt before making any decisions about further goals of care.  Palliative care will follow.    Met with HCP, pleased see GOC conversation noted above. Pt appears to be actively dying in respiratory distress. Spoke with her HCP Trinidad Brian via phone and updated her as to the pt's decline. Explained the pt is appropriate for inpatient hospice for symptom management and EOL care.   Ms. Brian stated it is against her belief to stop treatment or not seek treatments, she needs to see the pt before making any decisions about further goals of care.  Palliative care will follow.    Later met with HCP, pleased see GOC conversation noted above.

## 2022-03-16 NOTE — PATIENT PROFILE ADULT - FALL HARM RISK - HARM RISK INTERVENTIONS
Assistance with ambulation/Assistance OOB with selected safe patient handling equipment/Communicate Risk of Fall with Harm to all staff/Discuss with provider need for PT consult/Monitor gait and stability/Reinforce activity limits and safety measures with patient and family/Tailored Fall Risk Interventions/Use of alarms - bed, chair and/or voice tab/Visual Cue: Yellow wristband and red socks/Bed in lowest position, wheels locked, appropriate side rails in place/Call bell, personal items and telephone in reach/Instruct patient to call for assistance before getting out of bed or chair/Non-slip footwear when patient is out of bed/[place] to call system/Physically safe environment - no spills, clutter or unnecessary equipment/Purposeful Proactive Rounding/Room/bathroom lighting operational, light cord in reach

## 2022-03-16 NOTE — PROGRESS NOTE ADULT - PROBLEM SELECTOR PLAN 2
Pt p/w BUN/Cr 124/6.36   Baseline creatinine appears to be around 1.03-1.06  Likely in the setting of Post obstructive uropathy as CT abdomen shows new bilateral HUN, left sided hydronephrosis and severe hydroureter vs pre-renal given poor PO intake  continue IV fluids  U/A positive, continue meropenem (started on 3/15)  monitor BMP  Nephrology Dr Rodriguez following

## 2022-03-16 NOTE — PROGRESS NOTE ADULT - ASSESSMENT
Patient is a 89 y/o Female, from [facility], with medical hx significant for HTN, Bladder mass, Breast cancer s/p Mastectomy, Hx fracture, S/p Left hip IM nailing, and ORIF of Left olecranon fracture, sent from the facility with concern for hypoxia, admitted to medicine for Acute Hypoxic Respiratory Failure and ABDON likely in the setting of obstructive uropathy.  Pt was found to have UTI and started on abx. Additionally pt was found to be 90% oxygen saturation on room air.   CT brain negative for any acute finding  CT chest showing bilateral pleural effusions, left pleural effusion large with loculation  CT abdomen and pelvis shows Large pelvic mass with new bilateral HUN, left sided hydronephrosis and severe hydroureter    Pt is aox1 (name only), lethargic appearing, +wheezing, on 4 liters O2 95% on pulse ox. Palliative care consulted.

## 2022-03-16 NOTE — CONSULT NOTE ADULT - CONVERSATION/DISCUSSION
Diagnosis/Prognosis/MOLST Discussed/Treatment Options
Diagnosis/Prognosis/MOLST Discussed/Treatment Options/Hospice Referral

## 2022-03-16 NOTE — PROGRESS NOTE ADULT - PROBLEM SELECTOR PLAN 8
Pt has diagnosed bladder mass, visits Urologist regularly and has been on antibiotics repeatedly  Pt did not want to be further worked up, however likely malignant mass with now secondary obstruction and bilateral hydronephrosis  Surgery ORVILLE Villegas was informed regarding informing Urology Pt has diagnosed bladder mass, visits Urologist regularly and has been on antibiotics repeatedly  Pt did not want to be further worked up, however likely malignant mass with now secondary obstruction and bilateral hydronephrosis  pain control  Surgery ORVILLE Villegas was informed regarding informing Urology

## 2022-03-16 NOTE — CONSULT NOTE ADULT - PROBLEM SELECTOR RECOMMENDATION 5
Hx of falls. W/ hip and left olecranon fracture s/p left hip nailing.  Bedbound.  Total care.  High risk for skin failure.  Supportive care  Frequent positioning Hx of falls. W/ hip and left olecranon fracture s/p left hip nailing.  Bedbound.  Total care.  High risk for skin failure.  Supportive care  Frequent positioning    Comfort only

## 2022-03-16 NOTE — CONSULT NOTE ADULT - REASON FOR ADMISSION
Worsening shortness of breath and abdominal distension
Acute renal failure/ pneumonia
Worsening shortness of breath and abdominal distension
Worsening shortness of breath and abdominal distension

## 2022-03-16 NOTE — CONSULT NOTE ADULT - SUBJECTIVE AND OBJECTIVE BOX
HPI:  Patient is a 89 y/o Female, from [facility], with medical hx significant for HTN, Bladder mass, Breast cancer s/p Mastectomy, Hx fracture, S/p Left hip IM nailing, and ORIF of Left olecranon fracture, sent from the facility with concern for altered mental status and hypoxia. Pt says she is fine and she does not have any breathing issues, abdominal pain, dysuria, chest pain, nausea, vomiting, diarrhea, numbness, weakness. However she was noted to be in moderate level of pain when I was interviewing her with her HCP at bedside when she was attempting to void. HCP at bedside Trinidad Brian endorses that ever since pt was moved to Rehab after her fracture repair, she has not been eating well and lost about likely 10-15lbs. Her ambulation has been minimal given hip fracture repair with regular PT sessions. Pt has been having worsening abdominal distension ever since she was diagnosed with the bladder mass, has been to a Urologist who has been prescribing her antibiotics (however it seems that patient may have had the knowledge about her bladder mass longer than when her HCP had known about the condition). Pt's HCP says pt has had noticeably increased WOB and could hear the wheezing for over the last 1-2 weeks. Pt used to be very independent before the fracture of her hip in 2022, she lived alone and did all her ADLs by herself.    In the ED, pts vitals were   Temp Afebrile, HR 89, /76, RR 18 with 90% saturation on RA  CT chest showing bilateral pleural effusions, left pleural effusion large with loculation  CT abdomen and pelvis shows Large pelvic mass with new bilateral HUN, left sided hydronephrosis and severe hydroureter  WBC 61k, BUN/Cr 124/6.36 with hyperkalemia 6.3> 1 dose of Vanc and Zosyn, 1.5 L NS bolus, Albuterol neb x 3, Lokelma 10, Insulin reg 10 units with D50>potassium 5.5  U/A positive s/p Merrem in ED (15 Mar 2022 19:48)                  PAST MEDICAL & SURGICAL HISTORY:  Hypertension    Fracture of hip  S/p Left hip IM nailing    Olecranon fracture  S/p ORIF    Breast cancer  s/p Mastectomy    No significant past surgical history        No Known Allergies      Meds:  ALBUTerol    90 MICROgram(s) HFA Inhaler 2 Puff(s) Inhalation every 6 hours PRN  dextrose 5% + sodium chloride 0.45%. 1000 milliLiter(s) IV Continuous <Continuous>  heparin   Injectable 5000 Unit(s) SubCutaneous every 12 hours  HYDROmorphone  Injectable 0.5 milliGRAM(s) IV Push every 3 hours  HYDROmorphone  Injectable 0.5 milliGRAM(s) IV Push every 1 hour PRN  LORazepam   Injectable 1 milliGRAM(s) IV Push every 4 hours PRN  meropenem  IVPB      meropenem  IVPB 500 milliGRAM(s) IV Intermittent every 24 hours  sodium chloride 0.9%. 1000 milliLiter(s) IV Continuous <Continuous>  sodium zirconium cyclosilicate 10 Gram(s) Oral every 8 hours  tiotropium 18 MICROgram(s) Capsule 1 Capsule(s) Inhalation daily      SOCIAL HISTORY:  Smoker:  YES / NO        PACK YEARS:                         WHEN QUIT?  ETOH use:  YES / NO               FREQUENCY / QUANTITY:  Ilicit Drug use:  YES / NO  Occupation:  Assisted device use (Cane / Walker):  Live with:    FAMILY HISTORY:      VITALS:  Vital Signs Last 24 Hrs  T(C): 36.9 (16 Mar 2022 13:23), Max: 36.9 (16 Mar 2022 13:23)  T(F): 98.4 (16 Mar 2022 13:23), Max: 98.4 (16 Mar 2022 13:23)  HR: 75 (16 Mar 2022 13:23) (75 - 89)  BP: 111/76 (16 Mar 2022 13:23) (93/56 - 111/76)  BP(mean): --  RR: 20 (16 Mar 2022 13:23) (16 - 20)  SpO2: 93% (16 Mar 2022 13:23) (93% - 100%)    LABS/DIAGNOSTIC TESTS:                          10.0   54.69 )-----------( 317      ( 16 Mar 2022 10:54 )             32.4     WBC Count: 54.69 K/uL ( @ 10:54)  WBC Count: 61.56 K/uL (03-15 @ 13:54)          132<L>  |  106  |  96<H>  ----------------------------<  141<H>  6.0<H>   |  13<L>  |  5.21<H>    Ca    8.4      16 Mar 2022 10:54  Phos  5.5       Mg     2.4         TPro  6.0  /  Alb  1.8<L>  /  TBili  0.4  /  DBili  x   /  AST  15  /  ALT  10  /  AlkPhos  136<H>        Urinalysis Basic - ( 15 Mar 2022 14:32 )    Color: Ivette / Appearance: very cloudy / S.010 / pH: x  Gluc: x / Ketone: Negative  / Bili: Negative / Urobili: 1   Blood: x / Protein: 500 mg/dL / Nitrite: Negative   Leuk Esterase: Moderate / RBC: 2-5 /HPF / WBC 26-50 /HPF   Sq Epi: x / Non Sq Epi: Occasional /HPF / Bacteria: x        LIVER FUNCTIONS - ( 16 Mar 2022 10:54 )  Alb: 1.8 g/dL / Pro: 6.0 g/dL / ALK PHOS: 136 U/L / ALT: 10 U/L DA / AST: 15 U/L / GGT: x                 LACTATE:Lactate, Blood: 2.9 mmol/L ( @ 07:22)  Lactate, Blood: 2.5 mmol/L (03-15 @ 19:15)  Lactate, Blood: 2.4 mmol/L (03-15 @ 17:19)      ABG -     CULTURES:           RADIOLOGY:< from: CT Abdomen and Pelvis No Cont (03.15.22 @ 17:00) >  ACC: [id number] EXAM:  CT ABDOMEN AND PELVIS                        ACC: [id number] EXAM:  CT CHEST                          PROCEDURE DATE:  03/15/2022          INTERPRETATION:  CLINICAL INFORMATION: Shortness of breath and abnormal   chest x-ray. Breast cancer status post mastectomy and renal failure    COMPARISON: CT abdomen pelvis of 2022 and CT chest abdomen pelvis of   2/3/2022 chest x-ray of earlier in the day    CONTRAST/COMPLICATIONS:  IV Contrast: NONE  Oral Contrast: NONE  Complications: None reported at time of study completion    PROCEDURE:  CT of the Chest, Abdomen and Pelvis was performed.  Sagittal and coronal reformats were performed.    FINDINGS:  CHEST:  LUNGS AND LARGE AIRWAYS: Patent central airways. There are scattered  small right pulmonary nodules in the upper lobe on image 33 of series 2   and in the right middle lobe on image 85 are likely infectious   inflammatory. Not exclude neoplastic etiology.  PLEURA: Large left pleural effusion with extension into the fissure and   some loculation. Small right pleural effusion extends into the major   fissure.  VESSELS: Atherosclerotic changes of the aorta and coronary arteries.  HEART: Heart size is normal. No pericardial effusion.  MEDIASTINUM AND ZOILA: No lymphadenopathy.  CHEST WALL AND LOWER NECK: Left mastectomy    ABDOMEN AND PELVIS:  LIVER: Within normal limits.  BILE DUCTS: Normal caliber.  GALLBLADDER: Within normal limits.  SPLEEN: Coarsely peripherally calcified splenic cyst again noted.  PANCREAS: Within normal limits.  ADRENALS: Within normal limits.  KIDNEYS/URETERS: There is new severe right hydronephrosis and hydroureter   likely secondary to the large pelvic mass. There is new moderate left   hydronephrosis as well.    BLADDER: Verduzco catheter. Bladder is inseparable from the pelvic mass   which may arise from the bladder is partially based on the prior   contrast-enhanced study  REPRODUCTIVE ORGANS: The uterus is not identified. There is again a   complex cystic mass with calcified septations in the pelvis extending to   lower abdomen measuring 13.5 cm AP by 10.9 cm transverse on image 15 of   series 2    BOWEL: No bowel obstruction. Appendix is not visualized. No evidence of   inflammation in the pericecal region. There is diverticulosis without   diverticulitis  PERITONEUM: No ascites.  VESSELS: Atherosclerotic changes.  RETROPERITONEUM/LYMPH NODES: No lymphadenopathy.  ABDOMINAL WALL: Within normal limits.  BONES: Osteopenia and degenerative changes. ORIF of the left hip for   subacute fracture which was acute on the prior imaging study. Compression   fractures are again seen with the most severe fracture at T12. There is   also mild compression of L2 and L3 again noted. There are also moderate   to severe mid thoracic compression fracture    IMPRESSION: Cystic large pelvic mass with calcified septations is again   identified. This appears significantly increased in size from the prior   study suggesting aggressive tumor.  New bilateral hydronephrosis likely secondaryto pelvic mass. Left-sided   hydronephrosis and hydroureter is severe. It is moderate on the left.  Bilateral pleural effusions are new from the prior study. Left pleural   effusion is large with loculation  New small right-sided pulmonary nodules may be infectious inflammatory   versus neoplastic.  Since the prior study patient has undergone ORIF for left hip fracture    --- End of Report ---            EDWIN POWELL MD; Attending Radiologist  This document has been electronically signed. Mar [id number]  5:57PM    < end of copied text >        ROS  [  ] UNABLE TO ELICIT               HPI:  Patient is a 89 y/o Female, from [facility], with medical hx significant for HTN, Bladder mass, Breast cancer s/p Mastectomy, Hx fracture, S/p Left hip IM nailing, and ORIF of Left olecranon fracture, sent from the facility with concern for altered mental status and hypoxia. Pt says she is fine and she does not have any breathing issues, abdominal pain, dysuria, chest pain, nausea, vomiting, diarrhea, numbness, weakness. However she was noted to be in moderate level of pain when I was interviewing her with her HCP at bedside when she was attempting to void. HCP at bedside Trinidad Brian endorses that ever since pt was moved to Rehab after her fracture repair, she has not been eating well and lost about likely 10-15lbs. Her ambulation has been minimal given hip fracture repair with regular PT sessions. Pt has been having worsening abdominal distension ever since she was diagnosed with the bladder mass, has been to a Urologist who has been prescribing her antibiotics (however it seems that patient may have had the knowledge about her bladder mass longer than when her HCP had known about the condition). Pt's HCP says pt has had noticeably increased WOB and could hear the wheezing for over the last 1-2 weeks. Pt used to be very independent before the fracture of her hip in 2022, she lived alone and did all her ADLs by herself.    In the ED, pts vitals were   Temp Afebrile, HR 89, /76, RR 18 with 90% saturation on RA  CT chest showing bilateral pleural effusions, left pleural effusion large with loculation  CT abdomen and pelvis shows Large pelvic mass with new bilateral HUN, left sided hydronephrosis and severe hydroureter  WBC 61k, BUN/Cr 124/6.36 with hyperkalemia 6.3> 1 dose of Vanc and Zosyn, 1.5 L NS bolus, Albuterol neb x 3, Lokelma 10, Insulin reg 10 units with D50>potassium 5.5  U/A positive s/p Merrem in ED (15 Mar 2022 19:48)          History as above, patient is lethargic and can only mumble at this time and the only leading question she can answer at this time is that she has no pain, her healthcare proxy is at the bedside and states she has declined dramatically since yesterday when she was fully awake and responsive and even was able to sign her own DNR/ DNI form. She was admitted from a rehab center with AMS and hypoxia and was found to have a very high WBC count of 61k here along with being in acute renal failure and having a high potassium. She has a h/o a bladder mass and now has bilat hydronephrosis, seen on CT abdomen. She has a jalloh in place and has no urine output in it. She is visibly SOB even with oxygen. She is on Meropenem for possible UTI. She isn't coughing in from of me and she has no diarrhea or vomiting.        PAST MEDICAL & SURGICAL HISTORY:  Hypertension    Fracture of hip  S/p Left hip IM nailing    Olecranon fracture  S/p ORIF    Breast cancer  s/p Mastectomy    No significant past surgical history        No Known Allergies      Meds:  ALBUTerol    90 MICROgram(s) HFA Inhaler 2 Puff(s) Inhalation every 6 hours PRN  dextrose 5% + sodium chloride 0.45%. 1000 milliLiter(s) IV Continuous <Continuous>  heparin   Injectable 5000 Unit(s) SubCutaneous every 12 hours  HYDROmorphone  Injectable 0.5 milliGRAM(s) IV Push every 3 hours  HYDROmorphone  Injectable 0.5 milliGRAM(s) IV Push every 1 hour PRN  LORazepam   Injectable 1 milliGRAM(s) IV Push every 4 hours PRN  meropenem  IVPB      meropenem  IVPB 500 milliGRAM(s) IV Intermittent every 24 hours  sodium chloride 0.9%. 1000 milliLiter(s) IV Continuous <Continuous>  sodium zirconium cyclosilicate 10 Gram(s) Oral every 8 hours  tiotropium 18 MICROgram(s) Capsule 1 Capsule(s) Inhalation daily      SOCIAL HISTORY:  Smoker:  no  ETOH use:  no      FAMILY HISTORY: not contributory      VITALS:  Vital Signs Last 24 Hrs  T(C): 36.9 (16 Mar 2022 13:23), Max: 36.9 (16 Mar 2022 13:23)  T(F): 98.4 (16 Mar 2022 13:23), Max: 98.4 (16 Mar 2022 13:23)  HR: 75 (16 Mar 2022 13:23) (75 - 89)  BP: 111/76 (16 Mar 2022 13:23) (93/56 - 111/76)  BP(mean): --  RR: 20 (16 Mar 2022 13:23) (16 - 20)  SpO2: 93% (16 Mar 2022 13:23) (93% - 100%)    LABS/DIAGNOSTIC TESTS:                          10.0   54.69 )-----------( 317      ( 16 Mar 2022 10:54 )             32.4     WBC Count: 54.69 K/uL ( @ 10:54)  WBC Count: 61.56 K/uL (03-15 @ 13:54)          132<L>  |  106  |  96<H>  ----------------------------<  141<H>  6.0<H>   |  13<L>  |  5.21<H>    Ca    8.4      16 Mar 2022 10:54  Phos  5.5       Mg     2.4         TPro  6.0  /  Alb  1.8<L>  /  TBili  0.4  /  DBili  x   /  AST  15  /  ALT  10  /  AlkPhos  136<H>        Urinalysis Basic - ( 15 Mar 2022 14:32 )    Color: Ivette / Appearance: very cloudy / S.010 / pH: x  Gluc: x / Ketone: Negative  / Bili: Negative / Urobili: 1   Blood: x / Protein: 500 mg/dL / Nitrite: Negative   Leuk Esterase: Moderate / RBC: 2-5 /HPF / WBC 26-50 /HPF   Sq Epi: x / Non Sq Epi: Occasional /HPF / Bacteria: x        LIVER FUNCTIONS - ( 16 Mar 2022 10:54 )  Alb: 1.8 g/dL / Pro: 6.0 g/dL / ALK PHOS: 136 U/L / ALT: 10 U/L DA / AST: 15 U/L / GGT: x                 LACTATE:Lactate, Blood: 2.9 mmol/L ( @ 07:22)  Lactate, Blood: 2.5 mmol/L (03-15 @ 19:15)  Lactate, Blood: 2.4 mmol/L (03-15 @ 17:19)      ABG -     CULTURES:           RADIOLOGY:< from: CT Abdomen and Pelvis No Cont (03.15.22 @ 17:00) >  ACC: [id number] EXAM:  CT ABDOMEN AND PELVIS                        ACC: [id number] EXAM:  CT CHEST                          PROCEDURE DATE:  03/15/2022          INTERPRETATION:  CLINICAL INFORMATION: Shortness of breath and abnormal   chest x-ray. Breast cancer status post mastectomy and renal failure    COMPARISON: CT abdomen pelvis of 2022 and CT chest abdomen pelvis of   2/3/2022 chest x-ray of earlier in the day    CONTRAST/COMPLICATIONS:  IV Contrast: NONE  Oral Contrast: NONE  Complications: None reported at time of study completion    PROCEDURE:  CT of the Chest, Abdomen and Pelvis was performed.  Sagittal and coronal reformats were performed.    FINDINGS:  CHEST:  LUNGS AND LARGE AIRWAYS: Patent central airways. There are scattered  small right pulmonary nodules in the upper lobe on image 33 of series 2   and in the right middle lobe on image 85 are likely infectious   inflammatory. Not exclude neoplastic etiology.  PLEURA: Large left pleural effusion with extension into the fissure and   some loculation. Small right pleural effusion extends into the major   fissure.  VESSELS: Atherosclerotic changes of the aorta and coronary arteries.  HEART: Heart size is normal. No pericardial effusion.  MEDIASTINUM AND ZOILA: No lymphadenopathy.  CHEST WALL AND LOWER NECK: Left mastectomy    ABDOMEN AND PELVIS:  LIVER: Within normal limits.  BILE DUCTS: Normal caliber.  GALLBLADDER: Within normal limits.  SPLEEN: Coarsely peripherally calcified splenic cyst again noted.  PANCREAS: Within normal limits.  ADRENALS: Within normal limits.  KIDNEYS/URETERS: There is new severe right hydronephrosis and hydroureter   likely secondary to the large pelvic mass. There is new moderate left   hydronephrosis as well.    BLADDER: Jalloh catheter. Bladder is inseparable from the pelvic mass   which may arise from the bladder is partially based on the prior   contrast-enhanced study  REPRODUCTIVE ORGANS: The uterus is not identified. There is again a   complex cystic mass with calcified septations in the pelvis extending to   lower abdomen measuring 13.5 cm AP by 10.9 cm transverse on image 15 of   series 2    BOWEL: No bowel obstruction. Appendix is not visualized. No evidence of   inflammation in the pericecal region. There is diverticulosis without   diverticulitis  PERITONEUM: No ascites.  VESSELS: Atherosclerotic changes.  RETROPERITONEUM/LYMPH NODES: No lymphadenopathy.  ABDOMINAL WALL: Within normal limits.  BONES: Osteopenia and degenerative changes. ORIF of the left hip for   subacute fracture which was acute on the prior imaging study. Compression   fractures are again seen with the most severe fracture at T12. There is   also mild compression of L2 and L3 again noted. There are also moderate   to severe mid thoracic compression fracture    IMPRESSION: Cystic large pelvic mass with calcified septations is again   identified. This appears significantly increased in size from the prior   study suggesting aggressive tumor.  New bilateral hydronephrosis likely secondaryto pelvic mass. Left-sided   hydronephrosis and hydroureter is severe. It is moderate on the left.  Bilateral pleural effusions are new from the prior study. Left pleural   effusion is large with loculation  New small right-sided pulmonary nodules may be infectious inflammatory   versus neoplastic.  Since the prior study patient has undergone ORIF for left hip fracture    --- End of Report ---            EDWIN POWELL MD; Attending Radiologist  This document has been electronically signed. Mar [id number]  5:57PM    < end of copied text >        ROS  [ x ] UNABLE TO ELICIT

## 2022-03-16 NOTE — CONSULT NOTE ADULT - SUBJECTIVE AND OBJECTIVE BOX
PATIENT SEEN AND EXAMINED; FULL NOTE TO FOLLOW   [facility] NEPHROLOGY- CONSULTATION NOTE    Patient is a 89 y/o Female, [facility], with medical hx significant for HTN, Bladder mass, Breast cancer s/p Mastectomy, Hx fracture, S/p Left hip IM nailing, and ORIF of Left olecranon fracture, sent from the facility with concern for altered mental status and hypoxia. Pt a/w ABDON, Hyperkalemia, b/l hydroureteronephrosis, worsening bladder mass, acute hypoxic respiratory failure with large loculated left pleural effusion, small Rt pleural effusion with pulmonary nodules. Nephrology consulted for Elevated serum creatinine and hyperkalemia.     Pt with increased work of breathing but denies SOB when questioned. Unable to obtain history due to mental status.     PAST MEDICAL & SURGICAL HISTORY:  Hypertension    Fracture of hip  S/p Left hip IM nailing    Olecranon fracture  S/p ORIF    Breast cancer  s/p Mastectomy    No significant past surgical history      No Known Allergies    Home Medications Reviewed  Hospital Medications:   MEDICATIONS  (STANDING):  dextrose 5% + sodium chloride 0.45%. 1000 milliLiter(s) (75 mL/Hr) IV Continuous <Continuous>  glycopyrrolate Injectable 0.2 milliGRAM(s) IV Push every 6 hours  HYDROmorphone  Injectable 0.5 milliGRAM(s) IV Push every 3 hours  tiotropium 18 MICROgram(s) Capsule 1 Capsule(s) Inhalation daily    REVIEW OF SYSTEMS: Unable to obtain due to mental status    VITALS:  T(F): 98.4 (22 @ 13:23), Max: 98.4 (22 @ 13:23)  HR: 75 (22 @ 13:23)  BP: 111/76 (22 @ 13:23)  RR: 20 (22 @ 13:23)  SpO2: 93% (22 @ 13:23)  Wt(kg): --     @ 07:01  -   @ 19:01  --------------------------------------------------------  IN: 0 mL / OUT: 100 mL / NET: -100 mL        Weight (kg): 47.4 (03-15 @ 23:59)    PHYSICAL EXAM:  Gen: +respiratory distress with accessory muscle use  HEENT: anicteric  Neck: no JVD  Cards: RRR, +S1/S2,   Resp: bibasilar rales with diffuse wheezing  GI: +tender; +distended  : +jalloh with minimal; cloudy with blood/ pus  Extremities: no LE edema B/L  Derm: no rashes  Neuro: unable to assess    LABS:    132 <--, 134 <--, 134 <--, 132 <--, 130 <--  132<L>  |  106  |  96<H>  ----------------------------<  141<H>  6.0<H>   |  13<L>  |  5.21<H>    Ca    8.4      16 Mar 2022 10:54  Phos  5.5       Mg     2.4         TPro  6.0  /  Alb  1.8<L>  /  TBili  0.4  /  DBili      /  AST  15  /  ALT  10  /  AlkPhos  136<H>      Creatinine Trend: 5.21 <--, 5.24 <--, 5.10 <--, 5.73 <--, 6.36 <--                        10.0   54.69 )-----------( 317      ( 16 Mar 2022 10:54 )             32.4     Urine Studies:  Urinalysis Basic - ( 15 Mar 2022 14:32 )    Color: Ivette / Appearance: very cloudy / S.010 / pH:   Gluc:  / Ketone: Negative  / Bili: Negative / Urobili: 1   Blood:  / Protein: 500 mg/dL / Nitrite: Negative   Leuk Esterase: Moderate / RBC: 2-5 /HPF / WBC 26-50 /HPF   Sq Epi:  / Non Sq Epi: Occasional /HPF / Bacteria:     < from: CT Abdomen and Pelvis No Cont (03.15.22 @ 17:00) >    ACC: [id number] EXAM:  CT ABDOMEN AND PELVIS                        ACC: [id number] EXAM:  CT CHEST                          PROCEDURE DATE:  03/15/2022        < end of copied text >< from: CT Abdomen and Pelvis No Cont (03.15.22 @ 17:00) >  KIDNEYS/URETERS: There is new severe right hydronephrosis and hydroureter   likely secondary to the large pelvic mass. There is new moderate left   hydronephrosis as well.    BLADDER: Jalloh catheter. Bladder is inseparable from the pelvic mass   which may arise from the bladder is partially based on the prior   contrast-enhanced study    < end of copied text >  < from: CT Abdomen and Pelvis No Cont (03.15.22 @ 17:00) >  IMPRESSION: Cystic large pelvic mass with calcified septations is again   identified. This appears significantly increased in size from the prior   study suggesting aggressive tumor.  New bilateral hydronephrosis likely secondaryto pelvic mass. Left-sided   hydronephrosis and hydroureter is severe. It is moderate on the left.  Bilateral pleural effusions are new from the prior study. Left pleural   effusion is large with loculation  New small right-sided pulmonary nodules may be infectious inflammatory   versus neoplastic.  Since the prior study patient has undergone ORIF for left hip fracture    < end of copied text >      Sodium, Random Urine: 44 mmol/L (03-15 @ 17:00)  Creatinine, Random Urine: 30 mg/dL (03-15 @ 17:00)  Osmolality, Random Urine: 404 mos/kg (03-15 @ 17:00)    RADIOLOGY & ADDITIONAL STUDIES:

## 2022-03-16 NOTE — PROGRESS NOTE ADULT - PROBLEM SELECTOR PLAN 4
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

## 2022-03-16 NOTE — CONSULT NOTE ADULT - CONSULT REASON
Discuss complex medical decision making related to goals of care Discuss complex medical decision making related to goals of care due to multiple comorbidities and acute respiratory failure

## 2022-03-16 NOTE — CONSULT NOTE ADULT - ASSESSMENT
Leukocytosis   UTI    Plan - Cont Meropenem 500mgs iv q24hrs  patient might be going to hospice, if she does then will DC all abxs at that time  Prognosis is very poor.

## 2022-03-16 NOTE — CONSULT NOTE ADULT - PROBLEM SELECTOR RECOMMENDATION 9
p/w shortness of breath.  CT chest showing bilateral pleural effusions, left pleural effusion large with loculation.  Noted with IWOB on NC.       PT is DNR/DNI  -continue Dilaudid for dyspnea p/w shortness of breath,  Noted with IWOB on NC.       Pt is DNR/DNI/comfort measures only.  HCP agreed for IPU.  SW referral made.   -Dilaudid for dyspnea  -ativan prn for agitation   -glycopyrrolate prn for secretions  -bowel regimen

## 2022-03-16 NOTE — CHART NOTE - NSCHARTNOTEFT_GEN_A_CORE
EVENT: Potassium, Serum: 6.2: not hemolyzed. mmol/L (03.16.22 @ 00:04). Pt refusing Lokelma and neb tx    BRIEF HPI:  89 y/o Female, from [facility], with medical hx significant for HTN, Bladder mass, Breast cancer s/p Mastectomy, Hx fracture, S/p Left hip IM nailing, and ORIF of Left olecranon fracture, sent from the facility with concern for hypoxia, admitted for Acute Hypoxic Respiratory Failure and ABDON likely in the setting of obstructive uropathy    OBJECTIVE:  Vital Signs Last 24 Hrs  T(C): 36.3 (15 Mar 2022 23:59), Max: 36.7 (15 Mar 2022 12:30)  T(F): 97.3 (15 Mar 2022 23:59), Max: 98 (15 Mar 2022 12:30)  HR: 80 (15 Mar 2022 23:59) (68 - 89)  BP: 100/68 (15 Mar 2022 23:59) (100/65 - 127/77)  BP(mean): --  RR: 18 (15 Mar 2022 23:59) (16 - 18)  SpO2: 98% (15 Mar 2022 23:59) (90% - 100%)    FOCUSED PHYSICAL EXAM:  CV: S1 S2, regular  NEURO: Awake, uncooperative  RESP: Even, unlabored  : Verduzco in place    LABS:                        11.3   61.56 )-----------( 471      ( 15 Mar 2022 13:54 )             35.5     03-16    134<L>  |  107  |  93<H>  ----------------------------<  96  6.2<HH>   |  15<L>  |  5.10<H>    Ca    8.0<L>      16 Mar 2022 00:04  Phos  5.2     03-15  Mg     2.5     03-15    TPro  6.5  /  Alb  2.1<L>  /  TBili  0.5  /  DBili  x   /  AST  10  /  ALT  12  /  AlkPhos  127<H>  03-15    EKG: UTO    PROBLEM: Hyperkalemia probably due to ABODN  PLAN:   1. Dextrose 50% Injectable 50 milliliter(s) IV Push once  2. Insulin regular  human recombinant 10 Unit(s) IV Push once  3.Cont dextrose 5% + sodium chloride 0.45%. 1000 milliliter(s) (75 mL/Hr) IV Continuous <Continuous>    FOLLOW UP / RESULT: AM labs

## 2022-03-16 NOTE — PROGRESS NOTE ADULT - SUBJECTIVE AND OBJECTIVE BOX
Patient is a 89y old  Female who presents with a chief complaint of Worsening shortness of breath and abdominal distension (16 Mar 2022 09:15)    OVERNIGHT EVENTS: wheezing and shortness of breath    REVIEW OF SYSTEMS:  CONSTITUTIONAL: No fever, chills  NECK: No pain or stiffness  RESPIRATORY: +SOB, No cough  CARDIOVASCULAR: No chest pain, palpitations  GASTROINTESTINAL: No abdominal pain. No nausea, vomiting, or diarrhea  GENITOURINARY: No dysuria  NEUROLOGICAL: No HA  SKIN: No itching, burning, rashes, or lesions   MUSCULOSKELETAL: No joint pain or swelling; No muscle, back, or extremity pain    T(C): 36.9 (22 @ 13:23), Max: 36.9 (22 @ 13:23)  HR: 75 (22 @ 13:23) (68 - 89)  BP: 111/76 (22 @ 13:23) (93/56 - 127/77)  RR: 20 (22 @ 13:23) (16 - 20)  SpO2: 93% (22 @ 13:23) (93% - 100%)  Wt(kg): --Vital Signs Last 24 Hrs  T(C): 36.9 (16 Mar 2022 13:23), Max: 36.9 (16 Mar 2022 13:23)  T(F): 98.4 (16 Mar 2022 13:23), Max: 98.4 (16 Mar 2022 13:23)  HR: 75 (16 Mar 2022 13:23) (68 - 89)  BP: 111/76 (16 Mar 2022 13:23) (93/56 - 127/77)  BP(mean): --  RR: 20 (16 Mar 2022 13:23) (16 - 20)  SpO2: 93% (16 Mar 2022 13:23) (93% - 100%)    MEDICATIONS  (STANDING):  dextrose 5% + sodium chloride 0.45%. 1000 milliLiter(s) (75 mL/Hr) IV Continuous <Continuous>  heparin   Injectable 5000 Unit(s) SubCutaneous every 12 hours  meropenem  IVPB      meropenem  IVPB 500 milliGRAM(s) IV Intermittent every 24 hours  sodium chloride 0.9%. 1000 milliLiter(s) (70 mL/Hr) IV Continuous <Continuous>  sodium zirconium cyclosilicate 10 Gram(s) Oral every 8 hours  tiotropium 18 MICROgram(s) Capsule 1 Capsule(s) Inhalation daily    MEDICATIONS  (PRN):  ALBUTerol    90 MICROgram(s) HFA Inhaler 2 Puff(s) Inhalation every 6 hours PRN Shortness of Breath and/or Wheezing  HYDROmorphone  Injectable 0.5 milliGRAM(s) IV Push every 4 hours PRN Resp distress and/or Pain      PHYSICAL EXAM:  GENERAL: ill-appearing  EYES: clear conjunctiva; EOMI  ENMT: Moist mucous membranes  NECK: Supple, No JVD, Normal thyroid  CHEST/LUNG: Clear to auscultation bilaterally; No rales, rhonchi, wheezing, or rubs  HEART: S1, S2, Regular rate and rhythm  ABDOMEN: Soft, Nontender, Nondistended; Bowel sounds present  NEURO: Alert & Oriented X3  EXTREMITIES: No LE edema, no calf tenderness  LYMPH: No lymphadenopathy noted  SKIN: No rashes or lesions    Consultant(s) Notes Reviewed:  [x ] YES  [ ] NO  Care Discussed with Consultants/Other Providers [ x] YES  [ ] NO    LABS:                        10.0   54.69 )-----------( 317      ( 16 Mar 2022 10:54 )             32.4     03-16    132<L>  |  106  |  96<H>  ----------------------------<  141<H>  6.0<H>   |  13<L>  |  5.21<H>    Ca    8.4      16 Mar 2022 10:54  Phos  5.5     03-16  Mg     2.4     03-16    TPro  6.0  /  Alb  1.8<L>  /  TBili  0.4  /  DBili  x   /  AST  15  /  ALT  10  /  AlkPhos  136<H>  03-16      CAPILLARY BLOOD GLUCOSE      POCT Blood Glucose.: 144 mg/dL (16 Mar 2022 09:24)        Urinalysis Basic - ( 15 Mar 2022 14:32 )    Color: Ivette / Appearance: very cloudy / S.010 / pH: x  Gluc: x / Ketone: Negative  / Bili: Negative / Urobili: 1   Blood: x / Protein: 500 mg/dL / Nitrite: Negative   Leuk Esterase: Moderate / RBC: 2-5 /HPF / WBC 26-50 /HPF   Sq Epi: x / Non Sq Epi: Occasional /HPF / Bacteria: x        RADIOLOGY & ADDITIONAL TESTS:      Imaging Personally Reviewed:  [ ] YES  [ ] NO   Patient is a 89y old  Female who presents with a chief complaint of Worsening shortness of breath and abdominal distension (16 Mar 2022 09:15)    OVERNIGHT EVENTS: wheezing and shortness of breath    REVIEW OF SYSTEMS:  CONSTITUTIONAL: No fever, chills  NECK: No pain or stiffness  RESPIRATORY: +SOB, No cough  CARDIOVASCULAR: No chest pain, palpitations  GASTROINTESTINAL: No abdominal pain. No nausea, vomiting, or diarrhea  GENITOURINARY: No dysuria  NEUROLOGICAL: No HA  SKIN: No itching, burning, rashes, or lesions   MUSCULOSKELETAL: No joint pain or swelling; No muscle, back, or extremity pain    T(C): 36.9 (22 @ 13:23), Max: 36.9 (22 @ 13:23)  HR: 75 (22 @ 13:23) (68 - 89)  BP: 111/76 (22 @ 13:23) (93/56 - 127/77)  RR: 20 (22 @ 13:23) (16 - 20)  SpO2: 93% (22 @ 13:23) (93% - 100%)  Wt(kg): --Vital Signs Last 24 Hrs  T(C): 36.9 (16 Mar 2022 13:23), Max: 36.9 (16 Mar 2022 13:23)  T(F): 98.4 (16 Mar 2022 13:23), Max: 98.4 (16 Mar 2022 13:23)  HR: 75 (16 Mar 2022 13:23) (68 - 89)  BP: 111/76 (16 Mar 2022 13:23) (93/56 - 127/77)  BP(mean): --  RR: 20 (16 Mar 2022 13:23) (16 - 20)  SpO2: 93% (16 Mar 2022 13:23) (93% - 100%)    MEDICATIONS  (STANDING):  dextrose 5% + sodium chloride 0.45%. 1000 milliLiter(s) (75 mL/Hr) IV Continuous <Continuous>  heparin   Injectable 5000 Unit(s) SubCutaneous every 12 hours  meropenem  IVPB      meropenem  IVPB 500 milliGRAM(s) IV Intermittent every 24 hours  sodium chloride 0.9%. 1000 milliLiter(s) (70 mL/Hr) IV Continuous <Continuous>  sodium zirconium cyclosilicate 10 Gram(s) Oral every 8 hours  tiotropium 18 MICROgram(s) Capsule 1 Capsule(s) Inhalation daily    MEDICATIONS  (PRN):  ALBUTerol    90 MICROgram(s) HFA Inhaler 2 Puff(s) Inhalation every 6 hours PRN Shortness of Breath and/or Wheezing  HYDROmorphone  Injectable 0.5 milliGRAM(s) IV Push every 4 hours PRN Resp distress and/or Pain      PHYSICAL EXAM:  GENERAL: thin, ill-appearing, lethargic  EYES: clear conjunctiva  ENMT: Moist mucous membranes  NECK: Supple, No JVD  CHEST/LUNG: +tachypnea. +wheezing  HEART: S1, S2, Regular rate and rhythm  ABDOMEN: Soft, Nontender, +distended; Bowel sounds present  NEURO: Alert & Oriented X1 (name only)  EXTREMITIES: No LE edema, no calf tenderness  SKIN: No rashes or lesions    LABS:                        10.0   54.69 )-----------( 317      ( 16 Mar 2022 10:54 )             32.4     03-16    132<L>  |  106  |  96<H>  ----------------------------<  141<H>  6.0<H>   |  13<L>  |  5.21<H>    Ca    8.4      16 Mar 2022 10:54  Phos  5.5     03-16  Mg     2.4     03-16    TPro  6.0  /  Alb  1.8<L>  /  TBili  0.4  /  DBili  x   /  AST  15  /  ALT  10  /  AlkPhos  136<H>  03-16      CAPILLARY BLOOD GLUCOSE      POCT Blood Glucose.: 144 mg/dL (16 Mar 2022 09:24)        Urinalysis Basic - ( 15 Mar 2022 14:32 )    Color: Ivette / Appearance: very cloudy / S.010 / pH: x  Gluc: x / Ketone: Negative  / Bili: Negative / Urobili: 1   Blood: x / Protein: 500 mg/dL / Nitrite: Negative   Leuk Esterase: Moderate / RBC: 2-5 /HPF / WBC 26-50 /HPF   Sq Epi: x / Non Sq Epi: Occasional /HPF / Bacteria: x        RADIOLOGY & ADDITIONAL TESTS:        Imaging Personally Reviewed:  [ ] YES  [ ] NO   Patient is a 89y old  Female who presents with a chief complaint of Worsening shortness of breath and abdominal distension (16 Mar 2022 09:15)    OVERNIGHT EVENTS: wheezing and shortness of breath    REVIEW OF SYSTEMS:  CONSTITUTIONAL: No fever, chills  NECK: No pain or stiffness  RESPIRATORY: +SOB, No cough  CARDIOVASCULAR: No chest pain, palpitations  GASTROINTESTINAL: No abdominal pain. No nausea, vomiting, or diarrhea  GENITOURINARY: No dysuria  NEUROLOGICAL: No HA  SKIN: No itching, burning, rashes, or lesions   MUSCULOSKELETAL: No joint pain or swelling; No muscle, back, or extremity pain    T(C): 36.9 (22 @ 13:23), Max: 36.9 (22 @ 13:23)  HR: 75 (22 @ 13:23) (68 - 89)  BP: 111/76 (22 @ 13:23) (93/56 - 127/77)  RR: 20 (22 @ 13:23) (16 - 20)  SpO2: 93% (22 @ 13:23) (93% - 100%)  Wt(kg): --Vital Signs Last 24 Hrs  T(C): 36.9 (16 Mar 2022 13:23), Max: 36.9 (16 Mar 2022 13:23)  T(F): 98.4 (16 Mar 2022 13:23), Max: 98.4 (16 Mar 2022 13:23)  HR: 75 (16 Mar 2022 13:23) (68 - 89)  BP: 111/76 (16 Mar 2022 13:23) (93/56 - 127/77)  BP(mean): --  RR: 20 (16 Mar 2022 13:23) (16 - 20)  SpO2: 93% (16 Mar 2022 13:23) (93% - 100%)    MEDICATIONS  (STANDING):  dextrose 5% + sodium chloride 0.45%. 1000 milliLiter(s) (75 mL/Hr) IV Continuous <Continuous>  heparin   Injectable 5000 Unit(s) SubCutaneous every 12 hours  meropenem  IVPB      meropenem  IVPB 500 milliGRAM(s) IV Intermittent every 24 hours  sodium chloride 0.9%. 1000 milliLiter(s) (70 mL/Hr) IV Continuous <Continuous>  sodium zirconium cyclosilicate 10 Gram(s) Oral every 8 hours  tiotropium 18 MICROgram(s) Capsule 1 Capsule(s) Inhalation daily    MEDICATIONS  (PRN):  ALBUTerol    90 MICROgram(s) HFA Inhaler 2 Puff(s) Inhalation every 6 hours PRN Shortness of Breath and/or Wheezing  HYDROmorphone  Injectable 0.5 milliGRAM(s) IV Push every 4 hours PRN Resp distress and/or Pain      PHYSICAL EXAM:  GENERAL: thin, ill-appearing, lethargic  EYES: clear conjunctiva  ENMT: Moist mucous membranes  NECK: Supple, No JVD  CHEST/LUNG: +nasal cannula +tachypnea+wheezing  HEART: S1, S2, Regular rate and rhythm  ABDOMEN: Soft, Nontender, +distended; Bowel sounds present  : +jalloh  NEURO: Alert & Oriented X1 (name only)  EXTREMITIES: No LE edema, no calf tenderness  SKIN: No rashes or lesions    LABS:                        10.0   54.69 )-----------( 317      ( 16 Mar 2022 10:54 )             32.4     03-16    132<L>  |  106  |  96<H>  ----------------------------<  141<H>  6.0<H>   |  13<L>  |  5.21<H>    Ca    8.4      16 Mar 2022 10:54  Phos  5.5     03-16  Mg     2.4     -16    TPro  6.0  /  Alb  1.8<L>  /  TBili  0.4  /  DBili  x   /  AST  15  /  ALT  10  /  AlkPhos  136<H>  -16      CAPILLARY BLOOD GLUCOSE      POCT Blood Glucose.: 144 mg/dL (16 Mar 2022 09:24)        Urinalysis Basic - ( 15 Mar 2022 14:32 )    Color: Ivette / Appearance: very cloudy / S.010 / pH: x  Gluc: x / Ketone: Negative  / Bili: Negative / Urobili: 1   Blood: x / Protein: 500 mg/dL / Nitrite: Negative   Leuk Esterase: Moderate / RBC: 2-5 /HPF / WBC 26-50 /HPF   Sq Epi: x / Non Sq Epi: Occasional /HPF / Bacteria: x        RADIOLOGY & ADDITIONAL TESTS:  < from: CT Abdomen and Pelvis No Cont (03.15.22 @ 17:00) >  ACC: [id number] EXAM:  CT ABDOMEN AND PELVIS                        ACC: [id number] EXAM:  CT CHEST                          PROCEDURE DATE:  03/15/2022          INTERPRETATION:  CLINICAL INFORMATION: Shortness of breath and abnormal   chest x-ray. Breast cancer status post mastectomy and renal failure    COMPARISON: CT abdomen pelvis of 2022 and CT chest abdomen pelvis of   2/3/2022 chest x-ray of earlier in the day    CONTRAST/COMPLICATIONS:  IV Contrast: NONE  Oral Contrast: NONE  Complications: None reported at time of study completion    PROCEDURE:  CT of the Chest, Abdomen and Pelvis was performed.  Sagittal and coronal reformats were performed.    FINDINGS:  CHEST:  LUNGS AND LARGE AIRWAYS: Patent central airways. There are scattered  small right pulmonary nodules in the upper lobe on image 33 of series 2   and in the right middle lobe on image 85 are likely infectious   inflammatory. Not exclude neoplastic etiology.  PLEURA: Large left pleural effusion with extension into the fissure and   some loculation. Small right pleural effusion extends into the major   fissure.  VESSELS: Atherosclerotic changes of the aorta and coronary arteries.  HEART: Heart size is normal. No pericardial effusion.  MEDIASTINUM AND ZOILA: No lymphadenopathy.  CHEST WALL AND LOWER NECK: Left mastectomy    ABDOMEN AND PELVIS:  LIVER: Within normal limits.  BILE DUCTS: Normal caliber.  GALLBLADDER: Within normal limits.  SPLEEN: Coarsely peripherally calcified splenic cyst again noted.  PANCREAS: Within normal limits.  ADRENALS: Within normal limits.  KIDNEYS/URETERS: There is new severe right hydronephrosis and hydroureter   likely secondary to the large pelvic mass. There is new moderate left   hydronephrosis as well.    BLADDER: Jalloh catheter. Bladder is inseparable from the pelvic mass   which may arise from the bladder is partially based on the prior   contrast-enhanced study  REPRODUCTIVE ORGANS: The uterus is not identified. There is again a   complex cystic mass with calcified septations in the pelvis extending to   lower abdomen measuring 13.5 cm AP by 10.9 cm transverse on image 15 of   series 2    BOWEL: No bowel obstruction. Appendix is not visualized. No evidence of   inflammation in the pericecal region. There is diverticulosis without   diverticulitis  PERITONEUM: No ascites.  VESSELS: Atherosclerotic changes.  RETROPERITONEUM/LYMPH NODES: No lymphadenopathy.  ABDOMINAL WALL: Within normal limits.  BONES: Osteopenia and degenerative changes. ORIF of the left hip for   subacute fracture which was acute on the prior imaging study. Compression   fractures are again seen with the most severe fracture at T12. There is   also mild compression of L2 and L3 again noted. There are also moderate   to severe mid thoracic compression fracture    IMPRESSION: Cystic large pelvic mass with calcified septations is again   identified. This appears significantly increased in size from the prior   study suggesting aggressive tumor.  New bilateral hydronephrosis likely secondaryto pelvic mass. Left-sided   hydronephrosis and hydroureter is severe. It is moderate on the left.  Bilateral pleural effusions are new from the prior study. Left pleural   effusion is large with loculation  New small right-sided pulmonary nodules may be infectious inflammatory   versus neoplastic.  Since the prior study patient has undergone ORIF for left hip fracture    --- End of Report ---            EDWIN POWELL MD; Attending Radiologist  This document has been electronically signed. Mar [id number]  5:57PM    < end of copied text >  < from: CT Head No Cont (03.15.22 @ 16:59) >    ACC: [id number] EXAM:  CT BRAIN                          PROCEDURE DATE:  03/15/2022          INTERPRETATION:  CLINICAL INDICATION:  mental status change    TECHNIQUE: Noncontrast CT examination of the head was performed using   contiguous 5 mm CT images.    COMPARISON: There are no prior studies available for comparison.    FINDINGS:    Diagnostic accuracy is limited secondary to patient motion.    There is no gross evidence of mass or acute intracranial hemorrhage.   Ventricles and sulci are normal in size and configuration for the   patient's stated age. No midline shift or other significant mass effect   is noted. There is no CT evidence of acute territorial infarct. There are   periventricular white matter hypodensities that are nonspecific in nature   but may reflect chronic ischemic microvascular disease.    The visualized paranasal sinuses and tympanomastoid spaces are clear.   Left-sided scleral banding. Orbits and orbital contents are otherwise   unremarkable.    There is no depressed calvarial fracture. Small sebaceous cyst along the   occipital scalp.        IMPRESSION:    Markedly limited study secondary to patient motion. No gross evidence of   acute intracranial hemorrhage, midline shift or CT evidence of acute   territorial infarct.    If the patient's symptoms persist, consider short interval follow-up head   CT or brain MRI if there are no MRI contraindications.    --- End of Report ---            NATALIE CUADRA MD; Attending Radiologist  This document has been electronically signed. Mar 15 2022  5:35PM    < end of copied text >  < from: Xray Chest 1 View- PORTABLE-Urgent (03.15.22 @ 13:12) >    ACC: [id number] EXAM:  XR CHEST PORTABLE URGENT 1V                          PROCEDURE DATE:  03/15/2022          INTERPRETATION:  HISTORY: Chest pain    TECHNIQUE: A single AP view of the chest was obtained.    COMPARISON: 2022    FINDINGS: The heart size cannot be adequately assessed on this single   view. There is a large layering left pleural effusion. There is a left   pleural-based opacity which may represent loculated fluid in the pleural   space. There is a calcified left breast implant. There are small nodular   opacities in the mid right lung field. No focal consolidation is seen in   the right lung. There is no right pleural effusion.    IMPRESSION: Large layering left pleural effusion. Left-sided   pleural-based opacity may represent loculated fluid in the pleural space.   Small nodular opacities in the mid right lung field. Please correlate   with the CT scan of the chest performed the same day as this exam.        --- End of Report ---            CHELSEA QUINONES MD; Attending Radiologist  This document has been electronically signed. Mar 15 2022  8:06PM    < end of copied text >        Imaging Personally Reviewed:  [ ] YES  [ ] NO

## 2022-03-16 NOTE — CONSULT NOTE ADULT - PROBLEM SELECTOR RECOMMENDATION 3
Likely ABDON on CKD. Scr 5.21.      Avoid nephrotoxic medications/NSAIDs Likely ARF on CKD. Scr 5.21.   CT abdomen shows new bilateral HUN, left sided hydronephrosis and severe hydroureter is severe    Avoid nephrotoxic medications/NSAIDs Likely ARF on CKD. Scr 5.21.  2/2 obstructive uropathy with b/l hydroureteronephrosis due to bladder mass.  Per nephrology pt is not a candidate for PCN placement or HD.      Avoid nephrotoxic medications/NSAIDs

## 2022-03-16 NOTE — CONSULT NOTE ADULT - CONVERSATION DETAILS
Met with the pt's HCP Trinidad Brian, discussed her current clinical condition and explained she is appropriate for inpatient hospice for respiratory distress, and pain HCP agreed.    HCP emotional and social support. Met with the pt's HCP Trinidad Brian, discussed pt's current clinical condition and explained she is appropriate for inpatient hospice for management of respiratory distress, and pain.  Educated her about hospice philosophy.  Ms. Brian agreed fro IPU with medications for symptom management.  SW referral made.    Confirmed MOLST on file DNR/DNI.  Alk questions answered.  Support provided.   Plan discussed with primary team Met with the pt's HCP Trinidad Brian, discussed pt's current clinical condition and explained she is appropriate for inpatient hospice for management of respiratory distress, and pain.  Ms. Brian expressed being overwhelmed as the she has known the pt most of her life they were neighbors.  The pt assigned her as her HCP because she has no living relatives.  Educated her about hospice philosophy.  Ms. Brian expressed conflict with her Mandaeism and comfort measures, however she agreed for IPU with medications for symptom management.  SW referral made.    Confirmed MOLST on file DNR/DNI.  All questions answered.  Support provided.   Plan discussed with primary team

## 2022-03-16 NOTE — CONSULT NOTE ADULT - PROBLEM SELECTOR RECOMMENDATION 4
Clinical evidence indicates that the patient has Severe protein calorie malnutrition/ 3rd degree. Albumin 1.5    In context of   Chronic Illness (>1 month)    Energy/Food intake <50% of estimated energy requirement >5 days  Weight loss: Moderate - severe ( 10-15 lbs lost recently)  Body Fat loss: Severe   Cachexia, temporal wasting,  muscle atrophy  Muscle mass loss: Severe    Fluid Accumulation: Severe  pleural effusions   Strength: weakened severe bedbound    Recommend:   pleasure feeds as tolerated - aspiration precautions, careful hand-feeding, teaching to caregivers  nutritional supplements as tolerated, nutrition consult

## 2022-03-16 NOTE — PROGRESS NOTE ADULT - PROBLEM SELECTOR PLAN 1
Pt p/w hypoxia from NH, saturating mid 90s with 2-3 L NC, Left lung white out in Cxray  CT chest showing bilateral pleural effusions, left large pleural effusion with loculation  Observe for now, pt does not wish for aggressive intervention  s/p albuterol nebulizer  C/w Albuterol inhaler PRN and Ipratropium bromide inhaler daily  D-dimers likely elevated in the setting of infection  Oxygen support PRN for hypoxia  currently on 4L oxygen nasal cannula Pt p/w hypoxia from NH, saturating mid 90s with 2-3 L NC, Left lung white out in Cxray  CT chest showing bilateral pleural effusions, left large pleural effusion with loculation  Observe for now, pt does not wish for aggressive intervention  s/p albuterol nebulizer  C/w Albuterol inhaler PRN and Ipratropium bromide inhaler daily  NPO for now, aspiration precautions  D-dimers likely elevated in the setting of infection  Oxygen support PRN for hypoxia  currently on 4L oxygen nasal cannula  f/u speech eval

## 2022-03-16 NOTE — PROVIDER CONTACT NOTE (CRITICAL VALUE NOTIFICATION) - NS PROVIDER READ BACK
yes
Plan: Location: Face \\nDuration: Finished 6th month going on 7th month \\nMethod of Contraception: OBC & MLC \\nPrescribed: no RX \\nPharmacy: [facility]\\niPledge: [phone]\\n\\n\\nFAILED: SPIRONOLACTONE 100mg, Plexion topical cleanser \\n\\n07/24/2020 - Absorica 40mg x 30 capsules \\n08/24/2020- Absorica 40mg x 60 capsules \\n09/28/2020- Absorica 40mg x 60 capsules -- 2 capsules on even days and 1 capsule on odd days \\n10/26/2020- Absorica 40mg x 60 capsules -- Now Start. with 2 capsules daily \\n12/14/2020- Absorica 40mg x 60 capsules -- 2 capsules on even days and 1 capsule on odd days \\n\\n1/25/2021 Absorica 40mg take QD (Sent 60 capsules)\\n\\n3/8/2021 Absorica 40mg take QD (NO RX SENT OUT)\\n\\nPatient is here for a follow up on her Accutane\\nShe states that her skin has cleared up since starting Accutane therapy.\\nShe doesn’t complain of any side effects aside from dryness and is taking accutane with a fatty meal\\nInstructed pt to take 1 pill(40mg) on first day, 1 pill (40mg) on 2nd day, then 2 pills(80mg) on the 3rd day, etc. etc.\\n\\nShe states she has 2 boxes of isotretinoin left at her house \\nDiscussed with patient that we will not send out prescription today since she has some left isotretinoin that she needs to finish out  \\n\\nReviewed blood work with patient informed her that her Triglycerides are back to normal since last visit\\nI will have patient repeat the blood work (REQ. form) was provided to the patient in the office\\nDiscussed with patient side effects to be aware of: headaches, joint pain, dryness, nose bleeds, mood changes.\\nDuring therapy, avoid donating blood while on treatment.\\nUse sunscreen with zinc oxide to prevent sun damage and block UV light.\\nDiscussed with patient to take with a fatty meal in order to obtain the full absorption \\nDiscussed with patient that she will need to do her questions after i confirm her today and then she can call and request her medication at [facility] \\n\\nIPLEDGE program and consent form discussed with patient. The side effects and warnings for the use of Accutane were discussed with the patient. She understands she can not donate blood for 1 month post tx and can not have laser tx, cosmetic surgery, waxing or peels for 6 months post treatment.\\nDiscussed musculoskeletal SE’s in detail. Discussed HA’s, dry skin, and depression in detail. Patient denies depression. Denies personal or family H/O Crohns, IBS, IBD, or bloody stools.\\nUnderstand the need to fill Rx in 6 days and have monthly labs and OV.\\n\\nFollow up: 6 weeks.
Detail Level: Zone
Render In Strict Bullet Format?: No

## 2022-03-16 NOTE — CONSULT NOTE ADULT - ASSESSMENT
[facility] [place] [facility]  Chandrakant Gilliam M.D.  Anthony Madden D.O.  Hermila Rodriguez M.D.  Saritha Vallejo, MSN, ANP-C  [phone]    [address] [place] [address]   Patient is a 89 y/o Female, from [facility], with medical hx significant for HTN, Bladder mass, Breast cancer s/p Mastectomy, Hx fracture, S/p Left hip IM nailing, and ORIF of Left olecranon fracture, sent from the facility with concern for altered mental status and hypoxia. Pt a/w ABDON, Hyperkalemia, b/l hydroureteronephrosis, worsening bladder mass, acute hypoxic respiratory failure with large loculated left pleural effusion, small Rt pleural effusion with pulmonary nodules. Nephrology consulted for Elevated serum creatinine and hyperkalemia.   s/p Rasburicase 6mg IV x1 for elevated uric acid for possible tumor lysis    1. ABDON- due to obstructive uropathy with b/l hydroureteronephrosis due to bladder mass. Pt not a candidate for b/l PCN placement. Recc to d/c further IVF. Pt refused work-up of bladder mass; now in respiratory distress; poor prognosis. Pt not a candidate for dialysis.     2. Hyperkalemia- medical management; Pt on Lokelma 10g PO tid x 48 hrs.     3. Obstructive Uropathy- see above    4. Bladder Mass- pt refused further work up on last hospitalization.     Discussed overall poor prognosis with HCP (neighbor). Pt now DNR/ DNI; awaiting inpt hospice due to respiratory distress.       [facility]  Chandrakant Gilliam M.D.  Anthony Madden D.O.  Hermila Rodriguez M.D.  Saritha Vallejo, ZOË, ANP-C  [phone]    [address] [place] [address]

## 2022-03-17 NOTE — PROGRESS NOTE ADULT - PROBLEM SELECTOR PLAN 9
Pt has hx of Breast cancer s/p Mastectomy  Supportive care
Pt has hx of Breast cancer s/p Mastectomy  Supportive care

## 2022-03-17 NOTE — PROGRESS NOTE ADULT - PROBLEM SELECTOR PLAN 1
Pt p/w hypoxia from NH, saturating mid 90s with 2-3 L NC, Left lung white out in Cxray  CT chest showing bilateral pleural effusions, left large pleural effusion with loculation  Observe for now, pt does not wish for aggressive intervention  s/p albuterol nebulizer  C/w Albuterol inhaler PRN and Ipratropium bromide inhaler daily  NPO for now, aspiration precautions  D-dimers likely elevated in the setting of infection  Oxygen support PRN for hypoxia  currently on 4L oxygen nasal cannula  f/u speech eval Pt p/w hypoxia from NH, saturating mid 90s with 2-3 L NC, Left lung white out in Cxray  CT chest showing bilateral pleural effusions, left large pleural effusion with loculation  -Patient is from comfort measures only, pending Inpatient hospice   -C/w NC 2 L   f/u speech eval

## 2022-03-17 NOTE — PATIENT PROFILE ADULT - FALL HARM RISK - HARM RISK INTERVENTIONS
Assistance with ambulation/Assistance OOB with selected safe patient handling equipment/Communicate Risk of Fall with Harm to all staff/Discuss with provider need for PT consult/Monitor gait and stability/Reinforce activity limits and safety measures with patient and family/Tailored Fall Risk Interventions/Visual Cue: Yellow wristband and red socks/Bed in lowest position, wheels locked, appropriate side rails in place/Call bell, personal items and telephone in reach/Instruct patient to call for assistance before getting out of bed or chair/Non-slip footwear when patient is out of bed/[place] to call system/Physically safe environment - no spills, clutter or unnecessary equipment/Purposeful Proactive Rounding/Room/bathroom lighting operational, light cord in reach

## 2022-03-17 NOTE — DIETITIAN INITIAL EVALUATION ADULT. - OTHER INFO
Patient from [facility] & recently d/norma from [facility] in Feb. 2022. Visited pt. alert, on NC & very weak was able to "nod her head" & with minimal verbal response, presently NPO with IV  fluids, seen by Speech/Swallow & recommendation of "pleasure feeds" noted, per HCP/chart states "she has not been eating well and lost about likely 10-15 Lbs" noted, dosing wt. 115.4  Lbs on 03/17/22, also conducted nutrition focus physical exam, see below fields, Palliative Care team following, HCP deciding on hospice care/placement, multiple stage I & II pressure injuries w/wound care noted.

## 2022-03-17 NOTE — DIETITIAN INITIAL EVALUATION ADULT. - ORAL INTAKE PTA/DIET HISTORY
NH diet order - Regular with chopped poultry/meats/fish & thin liquids, Ensure Plus, 8oz once daily & LPS 30ml BID via PO

## 2022-03-17 NOTE — DIETITIAN INITIAL EVALUATION ADULT. - PERTINENT MEDS FT
MEDICATIONS  (STANDING):  dextrose 5% + sodium chloride 0.45%. 1000 milliLiter(s) (75 mL/Hr) IV Continuous <Continuous>  dextrose 5% + sodium chloride 0.45%. 1000 milliLiter(s) (75 mL/Hr) IV Continuous <Continuous>  glycopyrrolate Injectable 0.2 milliGRAM(s) IV Push every 6 hours  HYDROmorphone  Injectable 0.5 milliGRAM(s) IV Push every 3 hours  tiotropium 18 MICROgram(s) Capsule 1 Capsule(s) Inhalation daily    MEDICATIONS  (PRN):  ALBUTerol    90 MICROgram(s) HFA Inhaler 2 Puff(s) Inhalation every 6 hours PRN Shortness of Breath and/or Wheezing  HYDROmorphone  Injectable 0.5 milliGRAM(s) IV Push every 1 hour PRN dyspnea  LORazepam   Injectable 1 milliGRAM(s) IV Push every 4 hours PRN Agitation

## 2022-03-17 NOTE — PROGRESS NOTE ADULT - SUBJECTIVE AND OBJECTIVE BOX
89y Female is under our care for     REVIEW OF SYSTEMS:  [  ] Not able to elicit  General:	  Chest:	  GI:	  :  Skin:	  Musculoskeletal:	  Neuro:	    MEDS:    ALLERGIES: Allergies    No Known Allergies    Intolerances        VITALS:  Vital Signs Last 24 Hrs  T(C): 36.3 (17 Mar 2022 05:20), Max: 36.9 (16 Mar 2022 13:23)  T(F): 97.4 (17 Mar 2022 05:20), Max: 98.4 (16 Mar 2022 13:23)  HR: 70 (17 Mar 2022 09:27) (70 - 101)  BP: 113/66 (17 Mar 2022 09:27) (111/76 - 161/96)  BP(mean): --  RR: 20 (17 Mar 2022 05:20) (20 - 20)  SpO2: 98% (17 Mar 2022 05:20) (93% - 100%)      PHYSICAL EXAM:  HEENT:  Neck:  Respiratory:  Cardiovascular:  Gastrointestinal:  Extremities:  Skin:  Ortho:  Neuro:    LABS/DIAGNOSTIC TESTS:                        10.0   54.69 )-----------( 317      ( 16 Mar 2022 10:54 )             32.4     WBC Count: 54.69 K/uL (03-16 @ 10:54)  WBC Count: 61.56 K/uL (03-15 @ 13:54)    03-16    132<L>  |  106  |  96<H>  ----------------------------<  141<H>  6.0<H>   |  13<L>  |  5.21<H>    Ca    8.4      16 Mar 2022 10:54  Phos  5.5     03-16  Mg     2.4     03-16    TPro  6.0  /  Alb  1.8<L>  /  TBili  0.4  /  DBili  x   /  AST  15  /  ALT  10  /  AlkPhos  136<H>  03-16      CULTURES:   .Blood Blood  03-15 @ 21:02   No growth to date.  --  --      .Blood Blood-Peripheral  02-03 @ 03:59   No Growth Final  --  --      Clean Catch Clean Catch (Midstream)  02-02 @ 18:54   >=3 organisms. Probable collection contamination.  --  --        RADIOLOGY:  no new studies 89y Female is under our care for UTI and leukocytosis.  Patient was seen lethargic laying comfortably in bed with no acute distress on 2L nasal canula.  Patient is comfort measures at this time and antibiotics were discontinued yesterday.  She remains afebrile with downtrending WBC count.    REVIEW OF SYSTEMS:  [ x ] Not able to elicit      MEDS:    ALLERGIES: Allergies    No Known Allergies    Intolerances        VITALS:  Vital Signs Last 24 Hrs  T(C): 36.3 (17 Mar 2022 05:20), Max: 36.9 (16 Mar 2022 13:23)  T(F): 97.4 (17 Mar 2022 05:20), Max: 98.4 (16 Mar 2022 13:23)  HR: 70 (17 Mar 2022 09:27) (70 - 101)  BP: 113/66 (17 Mar 2022 09:27) (111/76 - 161/96)  BP(mean): --  RR: 20 (17 Mar 2022 05:20) (20 - 20)  SpO2: 98% (17 Mar 2022 05:20) (93% - 100%)      PHYSICAL EXAM:  HEENT: n/a  Neck: supple no LN's   Respiratory: lungs clear no rales  Cardiovascular: S1 S2 reg no murmurs  Gastrointestinal: +BS with soft, nondistended abdomen; nontender  : Verduzco catheter in place with cloudy urine  Extremities: no edema  Skin: no rashes  Ortho: n/a  Neuro: Lethargic      LABS/DIAGNOSTIC TESTS:                        10.0   54.69 )-----------( 317      ( 16 Mar 2022 10:54 )             32.4     WBC Count: 54.69 K/uL (03-16 @ 10:54)  WBC Count: 61.56 K/uL (03-15 @ 13:54)    03-16    132<L>  |  106  |  96<H>  ----------------------------<  141<H>  6.0<H>   |  13<L>  |  5.21<H>    Ca    8.4      16 Mar 2022 10:54  Phos  5.5     03-16  Mg     2.4     03-16    TPro  6.0  /  Alb  1.8<L>  /  TBili  0.4  /  DBili  x   /  AST  15  /  ALT  10  /  AlkPhos  136<H>  03-16      CULTURES:   .Blood Blood  03-15 @ 21:02   No growth to date.  --  --      .Blood Blood-Peripheral  02-03 @ 03:59   No Growth Final  --  --      Clean Catch Clean Catch (Midstream)  02-02 @ 18:54   >=3 organisms. Probable collection contamination.  --  --        RADIOLOGY:  no new studies

## 2022-03-17 NOTE — DISCHARGE NOTE PROVIDER - DETAILS OF MALNUTRITION DIAGNOSIS/DIAGNOSES
This patient has been assessed with a concern for Malnutrition and was treated during this hospitalization for the following Nutrition diagnosis/diagnoses:     -  03/17/2022: Severe protein-calorie malnutrition   -  03/17/2022: Underweight (BMI < 19)

## 2022-03-17 NOTE — DIETITIAN INITIAL EVALUATION ADULT. - PROBLEM SELECTOR PLAN 5
Pt has acute UTI on U/A, CT abdomen shows HUN likely 2/2 Bladder mass  Will treat with IV Merrem 500 Qdaily, will increase the frequency to Q12 if pt's creatinine improves  As patient has been treated by Urologist multiple times, suspect antimicrobial resistance, hence started on IV Merrem  Dr. Taveras consulted

## 2022-03-17 NOTE — PROGRESS NOTE ADULT - ATTENDING COMMENTS
Patient is a 89 y/o Female, from [facility], with medical hx significant for HTN, Bladder mass, Breast cancer s/p Mastectomy, Hx fracture, S/p Left hip IM nailing, and ORIF of Left olecranon fracture, sent from the facility with concern for hypoxia, admitted to medicine for Acute Hypoxic Respiratory Failure and ABDON likely in the setting of obstructive uropathy. Patient's clinical status deteriorated while in hospital, palliative care following currently for comfort measures only.       #Acute respiratory failure w/ hypoxia   # Severe protein calorie  malnutrition   # Hyperkalemia   # ABDON   #Obstructive uropathy  # Hyperuricemia   # Bladder mass suspected  cancer      Plan:  -Given patient's deteriorating status and over all poor prognosis, patient was made comfort care. She is pending acceptance to inpatient hospice. C/w Dilaudid, glycopyrrolate, no blood work, MEWS exempt. Discussed with  HCP. Ms. Trinidad Brian.

## 2022-03-17 NOTE — DISCHARGE NOTE PROVIDER - HOSPITAL COURSE
Patient is a 89 y/o Female, from [facility], with medical hx significant for HTN, Bladder mass, Breast cancer s/p Mastectomy, Hx fracture, S/p Left hip IM nailing, and ORIF of Left olecranon fracture, sent from the facility with concern for altered mental status and hypoxia. Pt says she is fine and she does not have any breathing issues, abdominal pain, dysuria, chest pain, nausea, vomiting, diarrhea, numbness, weakness. However she was noted to be in moderate level of pain when I was interviewing her with her HCP at bedside when she was attempting to void. HCP at bedside Trinidad Brian endorses that ever since pt was moved to Rehab after her fracture repair, she has not been eating well and lost about likely 10-15lbs. Her ambulation has been minimal given hip fracture repair with regular PT sessions. Pt has been having worsening abdominal distension ever since she was diagnosed with the bladder mass, has been to a Urologist who has been prescribing her antibiotics (however it seems that patient may have had the knowledge about her bladder mass longer than when her HCP had known about the condition). Pt's HCP says pt has had noticeably increased WOB and could hear the wheezing for over the last 1-2 weeks. Pt used to be very independent before the fracture of her hip in Feb 2022, she lived alone and did all her ADLs by herself.    In the ED, pts vitals were   Temp Afebrile, HR 89, /76, RR 18 with 90% saturation on RA  CT chest showing bilateral pleural effusions, left pleural effusion large with loculation  CT abdomen and pelvis shows Large pelvic mass with new bilateral HUN, left sided hydronephrosis and severe hydroureter  WBC 61k, BUN/Cr 124/6.36 with hyperkalemia 6.3> 1 dose of Vanc and Zosyn, 1.5 L NS bolus, Albuterol neb x 3, Lokelma 10, Insulin reg 10 units with D50>potassium 5.5  U/A positive s/p Merrem in ED   Patient is a 89 y/o Female, from [facility], with medical hx significant for HTN, Bladder mass, Breast cancer s/p Mastectomy, Hx fracture, S/p Left hip IM nailing, and ORIF of Left olecranon fracture, sent from the facility with concern for altered mental status and hypoxia. Pt says she is fine and she does not have any breathing issues, abdominal pain, dysuria, chest pain, nausea, vomiting, diarrhea, numbness, weakness. However she was noted to be in moderate level of pain when I was interviewing her with her HCP at bedside when she was attempting to void. HCP at bedside Trinidad Brian endorses that ever since pt was moved to Rehab after her fracture repair, she has not been eating well and lost about likely 10-15lbs. Her ambulation has been minimal given hip fracture repair with regular PT sessions. Pt has been having worsening abdominal distension ever since she was diagnosed with the bladder mass, has been to a Urologist who has been prescribing her antibiotics (however it seems that patient may have had the knowledge about her bladder mass longer than when her HCP had known about the condition). Pt's HCP says pt has had noticeably increased WOB and could hear the wheezing for over the last 1-2 weeks. Pt used to be very independent before the fracture of her hip in Feb 2022, she lived alone and did all her ADLs by herself.    In the ED, pts vitals were   Temp Afebrile, HR 89, /76, RR 18 with 90% saturation on RA  CT chest showing bilateral pleural effusions, left pleural effusion large with loculation  CT abdomen and pelvis shows Large pelvic mass with new bilateral HUN, left sided hydronephrosis and severe hydroureter  WBC 61k, BUN/Cr 124/6.36 with hyperkalemia 6.3> 1 dose of Vanc and Zosyn, 1.5 L NS bolus, Albuterol neb x 3, Lokelma 10, Insulin reg 10 units with D50>potassium 5.5  U/A positive s/p Merrem in ED    Pt was evaluated by palliative and agreed to IPU.      Please note that this a brief summary of hospital course, please refer to daily progress notes and consult notes for full course and events.

## 2022-03-17 NOTE — PROGRESS NOTE ADULT - PROBLEM SELECTOR PLAN 5
Pt p/w WBC 61k, baseline appears to be maximum of 25-30k  Likely in the setting of infection 2/2 UTI, and dehydration as pt has been having poor PO intake  Monitor for improvement with CBC daily and s/p IVF administration  continue meropenem  F/u urine and blood cultures
Pt p/w WBC 61k, baseline appears to be maximum of 25-30k  Likely in the setting of infection 2/2 UTI, and dehydration as pt has been having poor PO intake  Monitor for improvement with CBC daily and s/p IVF administration  Abx d/c  Blood cx NGTD  Urine culture in lab  Comfort Measure

## 2022-03-17 NOTE — PROGRESS NOTE ADULT - ASSESSMENT
Patient is a 89 y/o Female, from [facility], with medical hx significant for HTN, Bladder mass, Breast cancer s/p Mastectomy, Hx fracture, S/p Left hip IM nailing, and ORIF of Left olecranon fracture, sent from the facility with concern for hypoxia, admitted to medicine for Acute Hypoxic Respiratory Failure and ABDON likely in the setting of obstructive uropathy.  Pt was found to have UTI and started on abx. Additionally pt was found to be 90% oxygen saturation on room air.   CT brain negative for any acute finding  CT chest showing bilateral pleural effusions, left pleural effusion large with loculation  CT abdomen and pelvis shows Large pelvic mass with new bilateral HUN, left sided hydronephrosis and severe hydroureter    Pt is chronically ill and moribund appearing, but does appear to be in distress.  Palliative following.  Comfort measures.

## 2022-03-17 NOTE — DIETITIAN INITIAL EVALUATION ADULT. - PROBLEM SELECTOR PLAN 1
Pt p/w hypoxia from NH, saturating mid 90s with 2-3 L NC, Left lung white out in Cxray  CT chest showing bilateral pleural effusions, left large pleural effusion with loculation  Observe for now, pt does not wish for aggressive intervention  C/w Albuterol inhaler PRN and Ipratropium bromide inhaler daily  D-dimers likely elevated in the setting of infection  Oxygen support PRN for hypoxia  If patient does not clinically improve over the next 24 hours, will consult Palliative care

## 2022-03-17 NOTE — H&P ADULT - NSHPPHYSICALEXAM_GEN_ALL_CORE
Vital Signs Last 24 Hrs  T(C): 36.6 (03-17-22 @ 18:46), Max: 36.6 (03-17-22 @ 13:09)  T(F): 97.9 (03-17-22 @ 18:46), Max: 97.9 (03-17-22 @ 13:09)  HR: 89 (03-17-22 @ 18:46) (70 - 104)  BP: 100/43 (03-17-22 @ 18:46) (96/63 - 161/96)  BP(mean): --  RR: 18 (03-17-22 @ 18:46) (18 - 20)  SpO2: 100% (03-17-22 @ 18:46) (95% - 100%)       GENERAL: lethargic, cachectic, on NC,  NAD  HEENT: Atraumatic, oropharynx clear, neck supple  CHEST/LUNG:  mildly labored  HEART: Regular rate and rhythm    ABDOMEN: Soft, Nontender,  distended   : jalloh in place  MUSCULOSKELETAL:  No  edema,  bedbound  NERVOUS SYSTEM:  lethargic  SKIN: No rashes or lesions noted  Oral intake: npo

## 2022-03-17 NOTE — DIETITIAN INITIAL EVALUATION ADULT. - PERTINENT LABORATORY DATA
03-16 Na132 mmol/L<L> Glu 141 mg/dL<H> K+ 6.0 mmol/L<H> Cr  5.21 mg/dL<H> BUN 96 mg/dL<H>   03-16 Phos 5.5 mg/dL<H>   03-16 Alb 1.8 g/dL<L>

## 2022-03-17 NOTE — DIETITIAN INITIAL EVALUATION ADULT. - PROBLEM SELECTOR PLAN 3
Pt p/w BUN/Cr 124/6.36 with hyperkalemia 6.3>>Albuterol neb x 3, Lokelma 10, Insulin reg 10 units with D50>>repeat potassium 5.5  C/w Lokelma 10 [id number] x 48 hours

## 2022-03-17 NOTE — DISCHARGE NOTE PROVIDER - NSDCQMSTAIRS_GEN_ALL_CORE
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 MRI was concerning for osteomyelitis of the distal fibula and pt had evidence of aterial insufficiency on MIYA. Transferred for vascular surgery consultation prior to surgical I&D.       Left ankle wound with associated cellulitis  Osteomyelitis of distal left fibula: Pt was doing some remodeling work last week when he scraped his left anterior lower extremity and left ankle on the aluminum ladder.  He started to develop increased erythema and opening of the left ankle wound 2-3 days prior to admission.  Xray on admission with bimalleolar soft tissue swelling with no convincing evidence of osteomyelitis.  MIYA were obtained which showed BLE arterial insufficiency.  MRI obtained which shows distal fibula osteitis and concern for early osteomyelitis. Has been on vancomycin and Zosyn since admission. BC NGTD  - IV Vanc discontinued, continue with Zosyn  - ID following, appreciate input   - Podiatry consulted, refer to note by Dr. Whitley. Plan for I&D and bone resection of fibula with antibiotic bone cement placement at 0830 on 8/7  - NPO at midnight      Peripheral arterial disease s/p distal SFA and AT angioplasty with 3mm balloon complicated by distal embolization requiring thrombectomy and 2 mm distal AT/DP angioplasty (8/6/21): MIYA revealed moderate arterial insufficiency in the RLE and moderate to severe in the LLE.  - Vascular surgery following s/p procedure above.   - Plavix 300 mg X1 post-op with plan for 75 mg/day X3 months   - Lipid profile in the AM, start atorvastatin 40 mg po at bedtime     Elevated blood pressure: Noted (most  elevated readings this AM intraop during angiogram). Pt without significant pain. Renal function well managed. No hx of HTN.   - Plan to start ACE/ARB after procedure on    - PRN hydralazine for SBP >180      DM-2 with neuropathy, A1C 7.6: Metformin 1000 mg bid PTA  - Hold Metformin  - SSI     Covid status: Pt received both doses of vaccines.      Diet: Consistent Carb 60 grams CHO per Meal Diet    DVT Prophylaxis: Ambulate every shift  Manuel Catheter: Not present  Central Lines: None  Code Status: Full Code      Disposition Plan   Expected discharge: Pending clinical course     The patient's care was discussed with the Attending Physician, Dr. Velasquez, Bedside Nurse and Patient.    Ramandeep Short PA-C  Hospitalist Service  [facility]  Securely message with the Vocera Web Console (learn more here)  Text page via [facility] Paging/Directory  ______________________________________________________________________    Interval History   Pt resting comfortably in bed. No pain. Baseline neuropathy. He is from [place], has been in [place] since March as his father . 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 Daughter lives in [place].     Data reviewed today: I reviewed all medications, new labs and imaging results over the last 24 hours. I personally reviewed all labs and imaging to date.     Physical Exam   Vital Signs: Temp: 97.4  F (36.3  C) Temp src: Oral BP: (!) 144/74 Pulse: 69   Resp: 18 SpO2: 97 % O2 Device: None (Room air) Oxygen Delivery: 2 LPM  Weight: 0 lbs 0 oz    CONSTITUTIONAL: Pt laying in bed, dressed in hospital garb. Appears comfortable.  HEENT: Normocephalic, atraumatic. Oral mucosa pink and moist; negative for ulcerations, erythema, or exudates.  Dentition in good repair.   CARDIOVASCULAR: RRR, no murmurs, rubs, or extra heart sounds appreciated. Pulses +2/4 and regular in upper and lower extremities, bilaterally.    RESPIRATORY: No increased work of breathing. CTA, bilat; no wheezes, rales, or rhonchi appreciated.  GASTROINTESTINAL:  Abdomen soft, non-distended. BS auscultated in all four quadrants. Negative for tenderness to palpation.  No masses or organomegaly noted.  MUSCULOSKELETAL: Left 5th toe ambulation.   HEMATOLOGIC/LYMPHATIC/IMMUNOLOGIC: Negative for lower extremity edema, bilaterally.  NEUROLOGIC: Alert and oriented to person, place, and time.  strength intact. No focal neuro deficits.   SKIN: LLE with bandaged wound, noted cellulitis in demarcated area with small, non-purulent abrasions noted.     Data   Recent Labs   Lab 08/06/21  1548 08/06/21  0814 08/06/21  0213 08/05/21  0511 08/03/21  1920   WBC  --   --   --  10.3 12.9*   HGB  --   --   --  12.4* 13.1*   MCV  --   --   --  93 92   PLT  --   --   --  203 209   NA  --   --   --  136 136   POTASSIUM  --   --   --  4.3 3.9   CHLORIDE  --   --   --  107 104   CO2  --   --   --  26 25   BUN  --   --   --  18 27   CR  --   --   --  0.97 1.16   ANIONGAP  --   --   --  3 7   SHEILA  --   --   --  8.5 8.8   * 186* 192* 199* 182*     Recent Results (from the past 24 hour(s))   IR Lower Extremity Angiogram Left    Narrative    Preprocedure diagnosis: Non healing wound of the left lateral ankle  with distal fibular osteomyelitis.    Postprocedure diagnosis: Same.    PROCEDURE:  1. Ultrasound-guided right common femoral artery access..  2. Selective catheterization of left common iliac, external iliac,  common femoral, deep femoral, popliteal, anterior tibial and dorsalis  pedis arteries.  3. Left lower extremity arteriogram with runoff.  4. Left distal superficial femoral/proximal popliteal artery  recanalization with balloon angioplasty using 5 mm x 10 cm angioplasty  balloon with completion arteriogram.  5. Left distal superficial femoral/popliteal artery balloon  angioplasty using 6 mm x 8 cm drug-coated balloon with completion  arteriogram.  6. Left  anterior tibial artery balloon angioplasty using 3 mm x 22 cm  angioplasty balloon with completion arteriogram.  7. Left distal anterior tibial artery thrombectomy using Cat 3penumbra  device with balloon angioplasty using 2 mm x 10 cm angioplasty balloon  with completion arteriogram.  8. Right common femoral artery access closure with 6 Ethiopian Angio-Seal  device.    Surgeon: Tomas Champion M.D.  Assistant: Eliana Kinney M.D.    Sedation: Patient received 4 mg of intravenous Versed and 50 mcg of  intravenous fentanyl. Medication was administered under my direct  supervision. Medication was administered by registered nurse in IR.  The patient was continuously monitored.  Heparin: 10,000 units  Protamine: 30 mg.  Sedation time: 116 minutes  Fluoroscopy time: 25 minutes.  Total fluoroscopy dose: 102 mGy.  Contrast: 165 mL of Visipaque was used.    Indication for procedure: This is a 70-year-old male that nonhealing  of the distal left leg at the level of the lateral malleolus and  osteomyelitis of the distal fibula. Noninvasive studies show arterial  insufficiency. Arteriogram is advised for revascularization and limb  salvage.     Procedure details: Patient was identified and then taken to the  procedure room and placed in supine position. Both groins were  prepped. The right common femoral artery was located to the right  femoral head by way of fluoroscopy. It was further localized with  ultrasonography. Local anesthetic was administered in the right groin.  Images were stored. The right common femoral artery was accessed with  a micropuncture needle in retrograde fashion. This was then converted  to a 0.035 inch wire system and a short 5 Ethiopian sheath was placed.  4000 units of heparin were given. A 0.035 inch stiff Glidewire and  Omni Flush catheter were advanced into the distal aorta and aortoiliac  arteriogram was performed which showed that the distal aorta, both  common iliac arteries, external iliac arteries,  both hypogastric  arteries were patent. Wire and catheter were then advanced into the  distal left external iliac artery. Arteriogram showed that the distal  left external iliac artery, common femoral artery and deep femoral  arteries were patent. Proximal superior femoral artery had moderate  stenosis over a long segment. Otherwise it was patent. Wire was  advanced into the superficial femoral artery and replaced with an  angled Navicross catheter. Arteriogram showed that the distal  femoral/proximal popliteal artery was occluded. It reconstituted as an  above-the-knee popliteal artery. The tibioperoneal trunk was occluded.  The anterior tibial artery was patent at its origin but had multiple  areas of high-grade tendon stenoses down to the distal anterior tibial  artery. The peroneal artery reconstituted by way of intramuscular  collaterals. There was hyperemia in the distal anterior tibial artery  in the area of the wound. Additional 4000 units heparin were given.  The sheath was converted to a 6 Japanese crossover sheath and positioned  in the mid superficial femoral artery. The occlusion in the distal  superficial femoral artery was crossed with a straight 0.035 inch  Glidewire and Jordan cross catheter. Catheter was advanced into the  second segment of the popliteal artery. Arteriogram confirmed  intraluminal position. The occlusion was first treated with a 5 mm x  10 cm angioplasty balloon. Balloon was inflated to 10 amaury for 3  minutes. Balloon was deflated and removed. There was no residual  stenosis, perforation or dissection. Then the treated this with a 6 mm  x 8 cm drug coated balloon. Balloon was inflated to 10 amaury for 3  minutes. Balloon was deflated and removed and showed excellent  arteriographic result.     Then we recommend with a 0.014 inch transcend wire and Navicorss  catheter. The anterior tibial artery was selected. The wire and  catheter beyond the area of the multiple stenoses into the  "distal  anterior tibial artery. Intraluminal position was confirmed and the  anterior tibial artery was balloon angioplastied with a 3 mm x 22 cm  angioplasty balloon. Balloon was inflated for 3 minutes completion  arteriogram showed excellent without however it did not showed that  the distal anterior tibial artery was occluded. We suspected that this  is due to embolization. We gave additional 2000 units of heparin.  Multiple passes with Cat 3 thrombectomy device were made. The  completion arteriogram did not show resolution. The occlusion was  traversed with the 0.014\" wire and cat 3 catheter. The 0.014 inch wire  and positioned it in the dorsalis pedis artery anterior to the  occlusion was then balloon angioplastied with a 2 mm x 10 cm  angioplasty balloon. Balloon was inflated to 12 amaury for 3 minutes.  Balloon was then deflated and removed which showed significant  improvement and free flow of contrast into the dorsalis pedis artery.    It also showed hyperemia on the area of the left lateral malleolus.   We decided to treat this area again with a 14 amaury pressure and 3  minute inflation. Balloon was deflated and removed and showed in-line  flow into the forefoot. Procedure was concluded. 30 mg of protamine  were given.    Common femoral artery access site was closed with a 6 Wallisian  Angio-Seal device.    Patient tolerated the procedure well. There were no immediate  complications. Patient was taken to his room in stable condition.    I spoke to his daughter and explained our procedure.     TAD BAUGH MD         SYSTEM ID:  [id number]     Medications     sodium chloride 75 mL/hr at 08/06/21 1424       aspirin  81 mg Oral Daily     [START ON 8/7/2021] clopidogrel  75 mg Oral Daily     insulin aspart  1-7 Units Subcutaneous TID AC     insulin aspart  1-5 Units Subcutaneous At Bedtime     piperacillin-tazobactam  3.375 g Intravenous Q6H     sodium chloride (PF)  3 mL Intracatheter Q8H     " Yes

## 2022-03-17 NOTE — PROGRESS NOTE ADULT - PROBLEM SELECTOR PLAN 7
Pt has hx of HTN, takes Losartan 50 mg   HOLD in the setting of ABDON and soft BP  Monitor BP
Pt has hx of HTN, takes Losartan 50 mg   HOLD in the setting of ABDON and soft BP  Monitor BP

## 2022-03-17 NOTE — PROGRESS NOTE ADULT - REASON FOR ADMISSION
Worsening shortness of breath and abdominal distension

## 2022-03-17 NOTE — DIETITIAN INITIAL EVALUATION ADULT. - PROBLEM SELECTOR PLAN 2
Pt p/w BUN/Cr 124/6.36 with hyperkalemia 6.3>>Albuterol neb x 3, Lokelma 10, Insulin reg 10 units with D50>>repeat potassium 5.5  Baseline creatinine appears to be around 1.03-1.06  Likely in the setting of Post obstructive uropathy as CT abdomen shows new bilateral HUN, left sided hydronephrosis and severe hydroureter vs pre-renal given poor PO intake  -U/A positive, Will treat with Merrem given pt has been treated repeatedly with antibiotics outpatient  Repeat BMP later tonight  Consulted Dr Rodriguez    *******Added on uric acid in view of possible tumor lysis, if uric acid is elevated, can give Rasburicase 6 mg IV dose x 1

## 2022-03-17 NOTE — DIETITIAN INITIAL EVALUATION ADULT. - FACTORS AFF FOOD INTAKE
weakness, acute hypoxic respiratory failure, HTN, bladder mass, CKD, h/o hip fracture w/ORIF, left pleural effusion/difficulty with food procurement/preparation/pain/persistent lack of appetite/Anabaptist/ethnic/cultural/personal food preferences/other (specify)

## 2022-03-17 NOTE — SWALLOW BEDSIDE ASSESSMENT ADULT - COMMENTS
Pt lethargic, oriented to self if given alternatives (e.g., nodded when asked if her name is "Jessa"), not responsive to queries, HOB elevated to 90°, 2L/min O2 via n/c. Pt refused trial.

## 2022-03-17 NOTE — PROGRESS NOTE ADULT - PROBLEM SELECTOR PLAN 2
Pt p/w BUN/Cr 124/6.36   Baseline creatinine appears to be around 1.03-1.06  Likely in the setting of Post obstructive uropathy as CT abdomen shows new bilateral HUN, left sided hydronephrosis and severe hydroureter vs pre-renal given poor PO intake  continue IV fluids  U/A positive, continue meropenem (started on 3/15)  monitor BMP  Nephrology Dr Rodriguez following Pt p/w BUN/Cr 124/6.36   Baseline creatinine appears to be around 1.03-1.06  Likely in the setting of Post obstructive uropathy as CT abdomen shows new bilateral HUN, left sided hydronephrosis and severe hydroureter vs pre-renal given poor PO intake  -No further blood works, pending in-patient hospice

## 2022-03-17 NOTE — DISCHARGE NOTE NURSING/CASE MANAGEMENT/SOCIAL WORK - PATIENT PORTAL LINK FT
You can access the FollowMyHealth Patient Portal offered by [facility] by registering at the following website: [email or web address]. By joining [facility]’s FollowMyHealth portal, you will also be able to view your health information using other applications (apps) compatible with our system.

## 2022-03-17 NOTE — SWALLOW BEDSIDE ASSESSMENT ADULT - SLP PERTINENT HISTORY OF CURRENT PROBLEM
Patient is a 89 y/o Female, from [facility], with medical hx significant for HTN, Bladder mass, Breast cancer s/p Mastectomy, Hx fracture, S/p Left hip IM nailing, and ORIF of Left olecranon fracture, sent from the facility with concern for hypoxia, admitted to medicine for Acute Hypoxic Respiratory Failure and ABDON likely in the setting of obstructive uropathy.  Pt was found to have UTI and started on abx. Additionally pt was found to be 90% oxygen saturation on room air. Pt is aox1 (name only), lethargic appearing, +wheezing, on 4 liters O2 95% on pulse ox. Palliative care consulted.: DNI/DNR/pleasure feeds.

## 2022-03-17 NOTE — PROGRESS NOTE ADULT - ASSESSMENT
Leukocytosis   UTI    Plan - Patient is comfort measures at this time and antibiotics were DC'ed yesterday Leukocytosis   UTI    Plan - Patient is comfort measures at this time and antibiotics were DC'ed yesterday  Reconsult prn Leukocytosis   UTI    Plan - Patient is comfort measures at this time and antibiotics were DC'ed yesterday  Reconsult prn    I agree with above

## 2022-03-17 NOTE — PROGRESS NOTE ADULT - PROBLEM SELECTOR PLAN 4
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

## 2022-03-17 NOTE — DISCHARGE NOTE PROVIDER - NSDCMRMEDTOKEN_GEN_ALL_CORE_FT
Albuterol (Eqv-Proventil HFA) 90 mcg/inh inhalation aerosol: 2 puff(s) inhaled every 6 hours  Atrovent HFA 17 mcg/inh inhalation aerosol: 2 puff(s) inhaled 4 times a day  Dulcolax Laxative 5 mg oral delayed release tablet: 1 tab(s) orally once a day  ferrous sulfate 325 mg (65 mg elemental iron) oral tablet: 1 tab(s) orally once a day  folic acid 1 mg oral tablet: 1 tab(s) orally once a day  Lasix 20 mg oral tablet: 1 tab(s) orally once a day  losartan 50 mg oral tablet: 1 tab(s) orally once a day  Multiple Vitamins oral tablet: 1 tab(s) orally once a day  Senna 8.6 mg oral tablet: 2 tab(s) orally once a day (at bedtime)  Vitamin C 500 mg oral tablet: 1 tab(s) orally once a day  zinc (as gluconate) 50 mg oral tablet:    albuterol 90 mcg/inh inhalation aerosol: 2 puff(s) inhaled every 6 hours, As needed, Shortness of Breath and/or Wheezing  Dextrose 5% with 0.45% NaCl intravenous solution: 1000 milliliter(s) intravenous once  glycopyrrolate 0.2 mg/mL injectable solution: injectable every 6 hours  HYDROmorphone: 0.5 milligram(s) intravenous every 3 hours, As Needed Dyspnea or pain   HYDROmorphone: 0.5 milligram(s) intravenous every hour, As Needed dyspnea  LORazepam: 1 milligram(s) injectable every 4 hours, As Needed for agitation  tiotropium 18 mcg inhalation capsule: 1 cap(s) inhaled once a day

## 2022-03-17 NOTE — PROGRESS NOTE ADULT - PROBLEM SELECTOR PLAN 6
Pt has acute UTI on U/A, CT abdomen shows HUN likely 2/2 Bladder mass  As patient has been treated by Urologist multiple times, suspect antimicrobial resistance  continue meropenem 500 mg QD (can increase to 500 mg BID if creatinine improves)  ID Dr. Taveras following
Pt has acute UTI on U/A, CT abdomen shows HUN likely 2/2 Bladder mass  As patient has been treated by Urologist multiple times, suspect antimicrobial resistance  Off Abx-   ID Dr. Taveras following

## 2022-03-17 NOTE — PROGRESS NOTE ADULT - SUBJECTIVE AND OBJECTIVE BOX
NP Note discussed with Primary Attending    Patient is a 89y old Female who presents with a chief complaint of Worsening shortness of breath and abdominal distension (17 Mar 2022 09:25)      INTERVAL HPI/OVERNIGHT EVENTS: no new complaints    MEDICATIONS  (STANDING):  dextrose 5% + sodium chloride 0.45%. 1000 milliLiter(s) (75 mL/Hr) IV Continuous <Continuous>  dextrose 5% + sodium chloride 0.45%. 1000 milliLiter(s) (75 mL/Hr) IV Continuous <Continuous>  glycopyrrolate Injectable 0.2 milliGRAM(s) IV Push every 6 hours  HYDROmorphone  Injectable 0.5 milliGRAM(s) IV Push every 3 hours  tiotropium 18 MICROgram(s) Capsule 1 Capsule(s) Inhalation daily    MEDICATIONS  (PRN):  ALBUTerol    90 MICROgram(s) HFA Inhaler 2 Puff(s) Inhalation every 6 hours PRN Shortness of Breath and/or Wheezing  HYDROmorphone  Injectable 0.5 milliGRAM(s) IV Push every 1 hour PRN dyspnea  LORazepam   Injectable 1 milliGRAM(s) IV Push every 4 hours PRN Agitation      __________________________________________________  REVIEW OF SYSTEMS:    CONSTITUTIONAL: No fever,   EYES: no acute visual disturbances  NECK: No pain or stiffness  RESPIRATORY: No cough; No shortness of breath  CARDIOVASCULAR: No chest pain, no palpitations  GASTROINTESTINAL: No pain. No nausea or vomiting; No diarrhea   NEUROLOGICAL: No headache or numbness, no tremors  MUSCULOSKELETAL: No joint pain, no muscle pain  GENITOURINARY: no dysuria, no frequency, no hesitancy  PSYCHIATRY: no depression , no anxiety  ALL OTHER  ROS negative        Vital Signs Last 24 Hrs  T(C): 36.3 (17 Mar 2022 05:20), Max: 36.9 (16 Mar 2022 13:23)  T(F): 97.4 (17 Mar 2022 05:20), Max: 98.4 (16 Mar 2022 13:23)  HR: 70 (17 Mar 2022 09:27) (70 - 101)  BP: 113/66 (17 Mar 2022 09:27) (111/76 - 161/96)  BP(mean): --  RR: 20 (17 Mar 2022 05:20) (20 - 20)  SpO2: 98% (17 Mar 2022 05:20) (93% - 100%)    ________________________________________________  PHYSICAL EXAM:  GENERAL: chronically ill, moribund appearing,   HEENT: Normocephalic;  conjunctivae and sclerae clear; moist mucous membranes;   NECK : supple  CHEST/LUNG: moving air b/l w/ abdominal breathing   HEART: S1 S2  regular; no murmurs, gallops or rubs  ABDOMEN: nontender abd mass w/o guarding, + BS  EXTREMITIES: + mild ecchymosis w/ edema of upper extremities, + pulses   SKIN: warm and dry; no rash  NERVOUS SYSTEM: lethargic, oriented x self, does not follow commands,   _________________________________________________  LABS:                        10.0   54.69 )-----------( 317      ( 16 Mar 2022 10:54 )             32.4     03-16    132<L>  |  106  |  96<H>  ----------------------------<  141<H>  6.0<H>   |  13<L>  |  5.21<H>    Ca    8.4      16 Mar 2022 10:54  Phos  5.5     03-16  Mg     2.4     -16    TPro  6.0  /  Alb  1.8<L>  /  TBili  0.4  /  DBili  x   /  AST  15  /  ALT  10  /  AlkPhos  136<H>  03-16      Urinalysis Basic - ( 15 Mar 2022 14:32 )    Color: Ivette / Appearance: very cloudy / S.010 / pH: x  Gluc: x / Ketone: Negative  / Bili: Negative / Urobili: 1   Blood: x / Protein: 500 mg/dL / Nitrite: Negative   Leuk Esterase: Moderate / RBC: 2-5 /HPF / WBC 26-50 /HPF   Sq Epi: x / Non Sq Epi: Occasional /HPF / Bacteria: x      CAPILLARY BLOOD GLUCOSE            RADIOLOGY & ADDITIONAL TESTS:    Imaging  Reviewed:  YES/NO    Consultant(s) Notes Reviewed:   YES/ No      Plan of care was discussed with patient and /or primary care giver; all questions and concerns were addressed  NP Note discussed with Primary Attending    Patient is a 89y old Female who presents with a chief complaint of Worsening shortness of breath and abdominal distension (17 Mar 2022 09:25)      INTERVAL HPI/OVERNIGHT EVENTS: no new complaints    MEDICATIONS  (STANDING):  dextrose 5% + sodium chloride 0.45%. 1000 milliLiter(s) (75 mL/Hr) IV Continuous <Continuous>  dextrose 5% + sodium chloride 0.45%. 1000 milliLiter(s) (75 mL/Hr) IV Continuous <Continuous>  glycopyrrolate Injectable 0.2 milliGRAM(s) IV Push every 6 hours  HYDROmorphone  Injectable 0.5 milliGRAM(s) IV Push every 3 hours  tiotropium 18 MICROgram(s) Capsule 1 Capsule(s) Inhalation daily    MEDICATIONS  (PRN):  ALBUTerol    90 MICROgram(s) HFA Inhaler 2 Puff(s) Inhalation every 6 hours PRN Shortness of Breath and/or Wheezing  HYDROmorphone  Injectable 0.5 milliGRAM(s) IV Push every 1 hour PRN dyspnea  LORazepam   Injectable 1 milliGRAM(s) IV Push every 4 hours PRN Agitation      __________________________________________________  REVIEW OF SYSTEMS:  unable to obtain  given current status        Vital Signs Last 24 Hrs  T(C): 36.3 (17 Mar 2022 05:20), Max: 36.9 (16 Mar 2022 13:23)  T(F): 97.4 (17 Mar 2022 05:20), Max: 98.4 (16 Mar 2022 13:23)  HR: 70 (17 Mar 2022 09:27) (70 - 101)  BP: 113/66 (17 Mar 2022 09:27) (111/76 - 161/96)  BP(mean): --  RR: 20 (17 Mar 2022 05:20) (20 - 20)  SpO2: 98% (17 Mar 2022 05:20) (93% - 100%)    ________________________________________________  PHYSICAL EXAM:  GENERAL: chronically ill, moribund appearing,   HEENT: Normocephalic;  conjunctivae and sclerae clear; moist mucous membranes;   CHEST/LUNG: moving air b/l w/ abdominal breathing   HEART: S1 S2  regular; no murmurs, gallops or rubs  ABDOMEN: nontender abd mass w/o guarding, + BS  EXTREMITIES: + mild ecchymosis w/ edema of upper extremities, + pulses   SKIN: warm and dry; no rash  NERVOUS SYSTEM: lethargic, oriented x self, does not follow commands,   _________________________________________________  LABS:                        10.0   54.69 )-----------( 317      ( 16 Mar 2022 10:54 )             32.4     03-16    132<L>  |  106  |  96<H>  ----------------------------<  141<H>  6.0<H>   |  13<L>  |  5.21<H>    Ca    8.4      16 Mar 2022 10:54  Phos  5.5     03-16  Mg     2.4     -16    TPro  6.0  /  Alb  1.8<L>  /  TBili  0.4  /  DBili  x   /  AST  15  /  ALT  10  /  AlkPhos  136<H>  03-16      Urinalysis Basic - ( 15 Mar 2022 14:32 )    Color: Ivette / Appearance: very cloudy / S.010 / pH: x  Gluc: x / Ketone: Negative  / Bili: Negative / Urobili: 1   Blood: x / Protein: 500 mg/dL / Nitrite: Negative   Leuk Esterase: Moderate / RBC: 2-5 /HPF / WBC 26-50 /HPF   Sq Epi: x / Non Sq Epi: Occasional /HPF / Bacteria: x      CAPILLARY BLOOD GLUCOSE            RADIOLOGY & ADDITIONAL TESTS:    Imaging  Reviewed:  YES/NO    Consultant(s) Notes Reviewed:   YES/ No      Plan of care was discussed with patient and /or primary care giver; all questions and concerns were addressed

## 2022-03-17 NOTE — PROGRESS NOTE ADULT - PROBLEM SELECTOR PLAN 8
Pt has diagnosed bladder mass, visits Urologist regularly and has been on antibiotics repeatedly  Pt did not want to be further worked up, however likely malignant mass with now secondary obstruction and bilateral hydronephrosis  pain control  Surgery ORVILLE Villegas was informed regarding informing Urology

## 2022-03-17 NOTE — H&P ADULT - PROBLEM SELECTOR PLAN 5
Pt actively dying, prognosis likely hours to days. IPU appropriate for management of symptoms req IV medications, end of life care. HCP in agreement w plan. DNR/DNI, comfort measures.   Comfort meds as above  Bowel regimen  Oral care  Acetaminophen suppository prn fever

## 2022-03-17 NOTE — DIETITIAN INITIAL EVALUATION ADULT. - PROBLEM SELECTOR PLAN 7
Pt has diagnosed bladder mass, visits Urologist regularly and has been on antibiotics repeatedly  Pt did not want to be further worked up, however likely malignant mass with now secondary obstruction reflecting as HUN  Surgery ORVILLE Villegas was informed regarding informing Urology

## 2022-03-17 NOTE — H&P ADULT - HISTORY OF PRESENT ILLNESS
89 y/o Female, from [facility], with medical hx significant for HTN, Bladder mass, Breast cancer s/p Mastectomy, Hx fracture, S/p Left hip IM nailing, and ORIF of Left olecranon fracture, sent from the facility with concern for altered mental status and hypoxia. Pt says she is fine and she does not have any breathing issues, abdominal pain, dysuria, chest pain, nausea, vomiting, diarrhea, numbness, weakness. However she was noted to be in moderate level of pain when I was interviewing her with her HCP at bedside when she was attempting to void. HCP at bedside Trinidad Brian endorses that ever since pt was moved to Rehab after her fracture repair, she has not been eating well and lost about likely 10-15lbs. Her ambulation has been minimal given hip fracture repair with regular PT sessions. Pt has been having worsening abdominal distension ever since she was diagnosed with the bladder mass, has been to a Urologist who has been prescribing her antibiotics (however it seems that patient may have had the knowledge about her bladder mass longer than when her HCP had known about the condition). Pt's HCP says pt has had noticeably increased WOB and could hear the wheezing for over the last 1-2 weeks. Pt used to be very independent before the fracture of her hip in Feb 2022, she lived alone and did all her ADLs by herself.    In the ED, pts vitals were   Temp Afebrile, HR 89, /76, RR 18 with 90% saturation on RA  CT chest showing bilateral pleural effusions, left pleural effusion large with loculation  CT abdomen and pelvis shows Large pelvic mass with new bilateral HUN, left sided hydronephrosis and severe hydroureter  WBC 61k, BUN/Cr 124/6.36 with hyperkalemia 6.3> 1 dose of Vanc and Zosyn, 1.5 L NS bolus, Albuterol neb x 3, Lokelma 10, Insulin reg 10 units with D50>potassium 5.5  U/A positive s/p Merrem in ED    Patient  refused workup or treatment of bladder mass on last admission.     Pt was evaluated by palliative and HCP agreed to comfort measures, IPU for management of dyspnea, secretions.

## 2022-03-17 NOTE — DISCHARGE NOTE NURSING/CASE MANAGEMENT/SOCIAL WORK - NSDCPEFALRISK_GEN_ALL_CORE
For information on Fall & Injury Prevention, visit: [email or web address] OR  [email or web address] OR  https://www.cdc.gov/steadi/patient.html

## 2022-03-17 NOTE — PROGRESS NOTE ADULT - PROBLEM SELECTOR PLAN 3
pt with elevated uric acid level in setting of breast ca/bladder metastasis   s/p rasburicase 6 mg IV x1 dose  f/u repeat uric acid level
pt with elevated uric acid level in setting of breast ca/bladder metastasis   s/p rasburicase 6 mg IV x1 dose  Comfort Measure

## 2022-03-17 NOTE — DIETITIAN INITIAL EVALUATION ADULT. - SIGNS/SYMPTOMS
Poor oral intake w/11% wt.loss >2wks, [id number], severe muscle wasting, multiple pres. injuries I&II

## 2022-03-17 NOTE — DISCHARGE NOTE PROVIDER - NSDCCPCAREPLAN_GEN_ALL_CORE_FT
PRINCIPAL DISCHARGE DIAGNOSIS  Diagnosis: ABDON (acute kidney injury)  Assessment and Plan of Treatment: You were admitted for acute kidney injury associated by elevated renal function.      SECONDARY DISCHARGE DIAGNOSES  Diagnosis: Pleural effusion, left  Assessment and Plan of Treatment: Your chest xray showed a left pleural effusion which is fluid in the pleural space of your lung.    Diagnosis: Hydronephrosis  Assessment and Plan of Treatment: You kidney showed swelling which fluid accumulation within the kidneys and this finding is likely seconday to your pelvic mass.

## 2022-03-17 NOTE — H&P ADULT - PROBLEM SELECTOR PLAN 4
W/ obstructive uropathy with b/l hydroureteronephrosis. Remote h/o breast cancer s/p left mastectomy.     CT abd and pelvis: Cystic large pelvic mass with calcified septations is again identified. This appears significantly increased in size from the prior study suggesting aggressive tumor.     Pt refused work up or immunotherapy on prior admission.

## 2022-03-17 NOTE — DIETITIAN INITIAL EVALUATION ADULT. - PROBLEM SELECTOR PLAN 4
Pt p/w WBC 61k, baseline appears to be maximum of 25-30k  Likely in the setting of infection 2/2 UTI, and dehydration as pt has been having poor PO intake  Monitor for improvement with CBC daily and s/p IVF administration and Merrem  F/u urine and blood cultures

## 2022-03-18 PROBLEM — C50.919 MALIGNANT NEOPLASM OF UNSPECIFIED SITE OF UNSPECIFIED FEMALE BREAST: Chronic | Status: ACTIVE | Noted: 2022-01-01

## 2022-03-18 PROBLEM — S52.023A DISPLACED FRACTURE OF OLECRANON PROCESS WITHOUT INTRAARTICULAR EXTENSION OF UNSPECIFIED ULNA, INITIAL ENCOUNTER FOR CLOSED FRACTURE: Chronic | Status: ACTIVE | Noted: 2022-01-01

## 2022-03-18 PROBLEM — S72.009A FRACTURE OF UNSPECIFIED PART OF NECK OF UNSPECIFIED FEMUR, INITIAL ENCOUNTER FOR CLOSED FRACTURE: Chronic | Status: ACTIVE | Noted: 2022-01-01

## 2022-03-18 NOTE — PROGRESS NOTE ADULT - SUBJECTIVE AND OBJECTIVE BOX
MARCOS GUEVARA                    89y  Female       Symptom Requiring Inpatient Hospice Admission: dyspnea, secretions    Overnight events/interim history: labored breathing, audible secretions, receiving prns.     HPI:  89 y/o Female, from [facility], with medical hx significant for HTN, Bladder mass, Breast cancer s/p Mastectomy, Hx fracture, S/p Left hip IM nailing, and ORIF of Left olecranon fracture, sent from the facility with concern for altered mental status and hypoxia. Pt says she is fine and she does not have any breathing issues, abdominal pain, dysuria, chest pain, nausea, vomiting, diarrhea, numbness, weakness. However she was noted to be in moderate level of pain when I was interviewing her with her HCP at bedside when she was attempting to void. HCP at bedside Trinidad Brian endorses that ever since pt was moved to Rehab after her fracture repair, she has not been eating well and lost about likely 10-15lbs. Her ambulation has been minimal given hip fracture repair with regular PT sessions. Pt has been having worsening abdominal distension ever since she was diagnosed with the bladder mass, has been to a Urologist who has been prescribing her antibiotics (however it seems that patient may have had the knowledge about her bladder mass longer than when her HCP had known about the condition). Pt's HCP says pt has had noticeably increased WOB and could hear the wheezing for over the last 1-2 weeks. Pt used to be very independent before the fracture of her hip in Feb 2022, she lived alone and did all her ADLs by herself.    In the ED, pts vitals were   Temp Afebrile, HR 89, /76, RR 18 with 90% saturation on RA  CT chest showing bilateral pleural effusions, left pleural effusion large with loculation  CT abdomen and pelvis shows Large pelvic mass with new bilateral HUN, left sided hydronephrosis and severe hydroureter  WBC 61k, BUN/Cr 124/6.36 with hyperkalemia 6.3> 1 dose of Vanc and Zosyn, 1.5 L NS bolus, Albuterol neb x 3, Lokelma 10, Insulin reg 10 units with D50>potassium 5.5  U/A positive s/p Merrem in ED    Patient  refused workup or treatment of bladder mass on last admission.     Pt was evaluated by palliative and HCP agreed to comfort measures, IPU for management of dyspnea, secretions.     (17 Mar 2022 20:59)          PPSV2: 10    Code Status: DNR/DNI      Allergies    No Known Allergies    Intolerances        MEDICATIONS  (STANDING):  HYDROmorphone Infusion. 0.5 mG/Hr (0.5 mL/Hr) IV Continuous <Continuous>    MEDICATIONS  (PRN):  acetaminophen  Suppository .. 650 milliGRAM(s) Rectal every 6 hours PRN Temp greater or equal to 38.5C (101.3F)  bisacodyl Suppository 10 milliGRAM(s) Rectal daily PRN Constipation  glycopyrrolate Injectable 0.4 milliGRAM(s) IV Push four times a day PRN Secretions  HYDROmorphone  Injectable 0.5 milliGRAM(s) IV Push every 1 hour PRN labored breathing, respiratory distress  LORazepam   Injectable 1 milliGRAM(s) IV Push every 1 hour PRN Agitation                             Vital Signs Last 24 Hrs  T(C): 36.8 (18 Mar 2022 05:18), Max: 36.8 (18 Mar 2022 05:18)  T(F): 98.2 (18 Mar 2022 05:18), Max: 98.2 (18 Mar 2022 05:18)  HR: 88 (18 Mar 2022 05:18) (88 - 104)  BP: 68/35 (18 Mar 2022 05:18) (68/35 - 100/43)  BP(mean): --  RR: 18 (18 Mar 2022 05:18) (17 - 20)  SpO2: 91% (18 Mar 2022 05:18) (91% - 100%)    GENERAL: lethargic, cachectic, on NC,  NAD  HEENT: Atraumatic, oropharynx clear, neck supple, audible secretions  CHEST/LUNG:  mildly labored  HEART: Regular rate and rhythm    ABDOMEN: Soft, Nontender,  distended   : jalloh in place  MUSCULOSKELETAL:  No  edema,  bedbound  NERVOUS SYSTEM:  lethargic  SKIN: No rashes or lesions noted  Oral intake: npo

## 2022-03-18 NOTE — PROGRESS NOTE ADULT - PROBLEM SELECTOR PLAN 1
dilaudid 0.5 mg ivp every 3 hr ATC w prns for breakthrough dyspnea, will transition to dilaudid gtt to optimize comfort, better control work of breathing  O2 suppl via NC for comfort

## 2022-03-19 NOTE — DISCHARGE NOTE FOR THE EXPIRED PATIENT - OTHER SIGNIFICANT FINDINGS
I was called by the nurse to evaluate patient for unresponsiveness. On examination, patient was unresponsive, no spontaneous movements observed. Patient did not respond to verbal or noxious stimuli. Absent heart and breath sounds noted. Pupils fixed and dilated, corneal reflex absent. Patient pronounced dead at 2:13am. Attending Physician, Dr. Meghann Rivera made aware. Called Summer Saini at  multiple times and left a message to call back. I also called Trinidad Brian at  multiple times, it went to voicemail, unable to leave message because the mailbox is full. I was called by the nurse to evaluate patient for unresponsiveness. On examination, patient was unresponsive, no spontaneous movements observed. Patient did not respond to verbal or noxious stimuli. Absent heart and breath sounds noted. Pupils fixed and dilated, corneal reflex absent. Patient pronounced dead at 2:13am. Attending Physician, Dr. Meghann Rivera made aware. I spoke to Trinidad Brian at  and also Summer Saini at . Organ donation notified.

## 2022-03-19 NOTE — DISCHARGE NOTE FOR THE EXPIRED PATIENT - HOSPITAL COURSE
89 y/o Female, from [facility], with medical hx significant for HTN, Bladder mass, Breast cancer s/p Mastectomy, Hx fracture, S/p Left hip IM nailing, and ORIF of Left olecranon fracture, sent from the facility with concern for altered mental status and hypoxia. Pt says she is fine and she does not have any breathing issues, abdominal pain, dysuria, chest pain, nausea, vomiting, diarrhea, numbness, weakness. However she was noted to be in moderate level of pain when I was interviewing her with her HCP at bedside when she was attempting to void. HCP at bedside Trinidad Brian endorses that ever since pt was moved to Rehab after her fracture repair, she has not been eating well and lost about likely 10-15lbs. Her ambulation has been minimal given hip fracture repair with regular PT sessions. Pt has been having worsening abdominal distension ever since she was diagnosed with the bladder mass, has been to a Urologist who has been prescribing her antibiotics (however it seems that patient may have had the knowledge about her bladder mass longer than when her HCP had known about the condition). Pt's HCP says pt has had noticeably increased work of breathing and could hear the wheezing for over the last 1-2 weeks. Pt used to be very independent before the fracture of her hip in Feb 2022, she lived alone and did all her ADLs by herself.    In the ED, pts vitals were   Temp Afebrile, HR 89, /76, RR 18 with 90% saturation on RA  CT chest showing bilateral pleural effusions, left pleural effusion large with loculation  CT abdomen and pelvis shows Large pelvic mass with new bilateral HUN, left sided hydronephrosis and severe hydroureter  WBC 61k, BUN/Cr 124/6.36 with hyperkalemia 6.3> 1 dose of Vanc and Zosyn, 1.5 L NS bolus, Albuterol neb x 3, Lokelma 10, Insulin reg 10 units with D50>potassium 5.5  U/A positive s/p Merrem in ED  Patient  refused workup or treatment of bladder mass on last admission.   Pt was evaluated by palliative and HCP agreed to comfort measures, IPU for management of dyspnea, secretions

## 2022-03-20 LAB
CULTURE RESULTS: SIGNIFICANT CHANGE UP
CULTURE RESULTS: SIGNIFICANT CHANGE UP
SPECIMEN SOURCE: SIGNIFICANT CHANGE UP
SPECIMEN SOURCE: SIGNIFICANT CHANGE UP

## 2022-09-30 NOTE — PATIENT PROFILE ADULT - DO YOU LACK THE NECESSARY SUPPORT TO HELP YOU COPE WITH LIFE CHALLENGES?
Pharmacy Note - Admission Medication History    Pertinent Provider Information: n/a     ______________________________________________________________________    Prior To Admission (PTA) med list completed and updated in EMR.       PTA Med List   Medication Sig Last Dose     aspirin 81 MG EC tablet Take 81 mg by mouth daily 9/29/2022 at x6 tablets spread throughout the day     ibuprofen (ADVIL/MOTRIN) 200 MG tablet Take 600 mg by mouth every 8 hours as needed for mild pain Past Month at PRN     Loratadine-Pseudoephedrine (CLARITIN-D 12 HOUR PO) Take 1 tablet by mouth daily as needed Past Month at PRN       Information source(s): Patient and CareEverywhere/SureScripts  Method of interview communication: in-person with N95 mask and faceshield    Summary of Changes to PTA Med List  New: ibuprofen  Discontinued: Benadryl, prednisone  Changed: aspirin 325mg to 81mg daily    Patient was asked about OTC/herbal products specifically.  PTA med list reflects this.    Allergies were reviewed, assessed, and updated with the patient.      Patient does not use any multi-dose medications prior to admission.    The information provided in this note is only as accurate as the sources available at the time of the update(s).    Thank you for the opportunity to participate in the care of this patient.    Cinthya Dorman [facility]  9/29/2022 11:31 PM     no

## 2022-10-28 NOTE — ED PROVIDER NOTE - WET READ LAUNCH FT
Procedure(s): HYSTEROSCOPY, DILATION AND CURETTAGE, POLYPECTOMY. general    Anesthesia Post Evaluation      Multimodal analgesia: multimodal analgesia not used between 6 hours prior to anesthesia start to PACU discharge  Patient location during evaluation: PACU  Patient participation: complete - patient participated  Level of consciousness: awake and alert  Pain score: 0  Pain management: adequate  Airway patency: patent  Anesthetic complications: no  Cardiovascular status: hemodynamically stable and acceptable  Respiratory status: acceptable  Hydration status: acceptable  Comments: Patient seen and evaluated; no concerns. Post anesthesia nausea and vomiting:  none      INITIAL Post-op Vital signs:   Vitals Value Taken Time   /82 10/28/22 1625   Temp 36.4 °C (97.5 °F) 10/28/22 1610   Pulse 56 10/28/22 1630   Resp 12 10/28/22 1630   SpO2 100 % 10/28/22 1630   Vitals shown include unvalidated device data. There is 1 Wet Read(s) to document.

## 2023-01-08 NOTE — PATIENT PROFILE ADULT - FUNCTIONAL ASSESSMENT - DAILY ACTIVITY 1.
Use your albuterol every 4 hours for 2 days, then every 4-6 hours as needed.  Take prednisone as directed.  Stop smoking. Follow up with your primary care provider in 2 days. Return to the emergency department for any increase in symptoms or for any other new or worrisome symptoms.     
1 = Total assistance

## 2023-05-10 NOTE — PACU DISCHARGE NOTE - COMMENTS
05/10/23 1130   Tunneled Central Line Insertion/Assessment - Double Lumen  04/14/23 1319 Atrial Right   Placement Date/Time: 04/14/23 1319   Present Prior to Hospital Arrival?: No  Inserted by: MD  Hand Hygiene: Performed  Barrier Precautions: Performed  Skin Antisepsis: ChloraPrep  Location: Atrial Right  : Memoright Shwetha 19 cm HD catheter  ...   Site Assessment No drainage;No redness;No swelling;No warmth   Line Securement Device Secured with sutures   Dressing Type Central line dressing;CHG impregnated dressing/sponge;Transparent (Tegaderm)   Dressing Status Clean;Dry;Intact   Dressing Intervention Sterile dressing change   Date on Dressing 05/10/23   Dressing Due to be Changed 05/17/23   Distal Patency/Care normal saline locked   Proximal 1 Patency/Care normal saline locked   Post-Hemodialysis Assessment   Blood Volume Processed (Liters) 72.4 L   Dialyzer Clearance Moderately streaked   Duration of Treatment 210 minutes   Total UF (mL) 3500 mL   Net Fluid Removal 3000   Patient Response to Treatment Tolerated well.  Vitals stable. Net uf of 3 liters.   Post-Hemodialysis Comments Deaccessed per p and p.  Dressing per p and p.  Dressing changed.  Clearguards applied.        to [address]

## 2023-05-11 NOTE — PATIENT PROFILE ADULT - STATED REASON FOR ADMISSION
- Small amount of bleeding is normal, please change the mustache dressing as needed to prevent dripping from the nose  - May use small amount of afrin and hold pressure if notice to have active bleeding  - May use nasal saline spray to help with crusty in the nose  - Please start Neilmed nasal rinse 2 times a day, starting tomorrow  - Doxycycline 100mg BID   - Do not blow or pick nose  - Pain Control: OTC Tylenol 650mg every 6 hours as needed for pain. Do not take more than 4000mg of Tylenol in 24 hour period.   - No NSAIDs   - Please follow up with Dr. Arellano in a week
Acute Renal Failure and Hypoxemia.

## 2023-06-08 NOTE — PHYSICAL THERAPY INITIAL EVALUATION ADULT - BALANCE DISTURBANCE, SYSTEM IMPAIRMENT CONTRIBUTE, REHAB EVAL
Take antibiotic as prescribed. Make sure you take the entire course. Can take OTC pain relievers as needed. Saltwater gargles. Follow up with dentist for further care. Continue to take BP medications. Follow up with PCP for ongoing care.
neuromuscular/musculoskeletal
neuromuscular/musculoskeletal

## 2023-08-21 NOTE — CONSULT NOTE ADULT - TREATMENT GUIDELINES
Surgery Instructions       Patient Name:  Isabella Knapp  Surgery: Left total knee arthroplasty  Date of Surgery:  December 22, 2023    Your procedure is scheduled with Mal Degroot MD at:    [address] [address]  The surgery department is located on the 1st floor between our two Main Entrances (North and South) on [facility]. Please use the Main hospital entrance.     You will receive a call from the pre op department the day before surgery between 8 AM and 3 PM (they will call you Friday if your surgery is on a Monday) to confirm surgery time and other details. If you do not receive this call for surgery instructions, please call [phone] between 8AM-3PM.    If you should become ill prior to surgery, or have an unexpected need to cancel surgery, please call the Orthopedic Office at [phone] Monday - Thursday, 8AM-4:30PM and Friday, 8AM-4PM.    If it is after hours AND the Orthopedic Office is closed and it is the day before surgery, please call [phone].     For your safety, you must have a ride home after surgery due to anesthesia and post op pain medication.  This must be someone who can take responsibility for you and assist with your discharge from the hospital/surgery center, not a cab, bus, etc.  You will need someone available to remain with you for up to 24 hours after you are discharged.     Schedule your preoperative exam with your family doctor within 30 days of surgery, but no later than 1 week prior to surgery. Your Family Medicine Provider may ask you to schedule a visit with a Cardiologist to complete additional testing; this must be completed prior to your surgery.      You will need to attend physical therapy after surgery. Please contact the therapy department prior to your surgery to set up these appointments.   Physical Therapy begin date: December 26, 2023   Physical Therapy timeline: 3 times a week for 8-12 weeks    In preparation for surgery,  we ask that you meet with our Perioperative Surgery Clinic.  The Perioperative Surgery Clinic will contact you to schedule an appointment approximately 30 days prior to your surgery.  The purpose of this visit is to evaluate you from an anesthesia perspective, ensure you are medically optimized to undergo surgery safely, discuss diet and medication instructions.  This visit lasts approximately 45 minutes and is located at [address]  in the surgery department.  If you have any concerns regarding this visit, you may contact either Talisha CARRILLO at [phone] or Karie Foley NP at [phone].      Post-Operative Appointment will be on:  Date and Time: January 2, 2024 @ 10:00am with Damaso Franklin NP at the [facility].     Pre-Procedure / Pre-Surgery COVID-19 Testing is NOT required as long as you remain symptom free from now through your surgery date.   Covid testing is no longer required prior to surgery.     Please continue to monitor for signs/symptoms of COVID-19 and notify your primary care physician for an evaluation.  Please contact the surgeon's office ASAP if you test positive for COVID before your scheduled surgery. We may need to postpone your procedure until it is safe for you to proceed.     Important Information:  1.   For leg/ankle/foot surgery, bring crutches or a walker with, if you have them.     Review handout for Chlorhexidine Skin Preparation.     As part of your post-operative therapy, Dr. Degroot has prescribed for you a Continuous Passive Motion (CPM) machine to reduce pain and swelling to help restore your range of motion. A representative from [employer] will contact you approximately one week before surgery to discuss insurance coverage and coordinate delivery/demonstration of the machine to your home.  They will work directly with your insurance company for authorization/coverage and will discuss billing/out-of-pocket expenses with you directly.  If you have  any questions or do not hear from them at least 3 days before your surgery, please contact them at [phone]. If you decide to decline the equipment for any reason, please call our office so the order can be cancelled and we can make a note of this.   Please note: the shoulder CPM machine is not covered by Medicare/Medicaid.      We will put a referral through to your insurance company to ensure that they have all the pre-authorization information they need from us. We will let you know if we encounter any issues or have any concerns in advance of your scheduled surgery. If you have any questions regarding your benefits/out-of-pocket expenses, please contact your insurance company. You may check with your insurance a few days prior to your procedure to confirm that the procedure has been approved. If you have any questions after speaking with your insurance company regarding your surgery authorization, please contact our Authorization Department at [phone].    If this is a work-related injury, please check with your workers compensation Insurance company or  to confirm that the procedure has been authorized.     If you have any work related and/or disability forms that need to be completed, please bring these forms to the office or fax them to our forms processing department in [place] at [phone].  If you have any questions regarding your forms, you can contact the Forms Department at [phone].  Please be advised that it can take 7-10 business days to complete these forms.     Pain Medication will only be prescribed post-operatively for up to 8-12 weeks. We ask that you allow 48-72 hours for pain medication refills to be completed.     You will receive an invitation via email from [place] to view [email or web address].  This informative web-based education program will give you helpful information pertaining to your upcoming surgery.       Please feel free to contact me with any questions.      FILIBERTO [facility]is[facility] [address]s Nurse and Surgery Scheduler  Monday-Thursday [phone]  Friday: Off    DNR Order

## 2023-12-16 NOTE — SWALLOW BEDSIDE ASSESSMENT ADULT - SWALLOW EVAL: RECOMMENDED DIET
Unable to make diet recommendations at this time 2/2 pt refusal to take PO trials for swallow evaluation. Therefore, will defer PO diet to medical team at this time. Per SNF papers, pt was previously on regular diet and thin liquids, however, given pt's current mental status, if aligned w/ medical team and HCP wishes to re-initiate PO diet, would attempt a more soft consistency at this time.
Normal rate, regular rhythm.  Heart sounds S1, S2.

## 2024-12-04 NOTE — ED ADULT NURSE NOTE - PAIN RATING/NUMBER SCALE (0-10): ACTIVITY
on 2024. Hx of depression and anxiety depression and TAM screenings sent.  
----- Message from Ania FRYE RN sent at 11/25/2024  2:18 PM CST -----  Please contact pt for PP depression screening.  
0

## 2025-04-16 NOTE — PROGRESS NOTE ADULT - PROBLEM SELECTOR PROBLEM 3
Labs due  Mychart message sent  Future Appointments   Date Time Provider Department Center   5/5/2025  3:30 PM NP TX RN1 NP [facility] [place] [facility]   6/2/2025  3:30 PM NP TX RN3 NP [facility] [place] [facility]   6/30/2025  3:30 PM NP TX RN3 NP [facility] [place] [facility]   7/28/2025  3:30 PM NP TX RN3 NP [facility] [place] [facility]   8/25/2025  3:30 PM NP TX RN2 NP [facility] [place] [facility]   9/22/2025  3:30 PM NP OOT NP [facility] [place] [facility]   9/22/2025  3:45 PM Jason Munoz MD NP [facility] [place] [facility]   9/22/2025  4:00 PM NP TX RN3 NP [facility] [place] [facility]         Hematuria

## (undated) DEVICE — FOR-ESU VALLEYLAB T7E14843DX: Type: DURABLE MEDICAL EQUIPMENT

## (undated) DEVICE — SUT POLYSORB 1 18" UNDYED

## (undated) DEVICE — POSITIONER MATTRESS PAD CONVOLUTED FOAM

## (undated) DEVICE — SOL IRR POUR NS 0.9% 1500ML

## (undated) DEVICE — DRAPE IOBAN 13X13"

## (undated) DEVICE — DRSG ADAPTIC 3X8"

## (undated) DEVICE — ELCTR GROUNDING PAD ADULT COVIDIEN

## (undated) DEVICE — DRAPE ISOLATION W IOBAN & POUCH

## (undated) DEVICE — GLV 8.5 PROTEXIS BLUE

## (undated) DEVICE — DRILL BIT STRYKER 2.6MM

## (undated) DEVICE — DRSG WEBRIL 6"

## (undated) DEVICE — DRSG MEDIPORE DRESS IT 7-7/8 X 11"

## (undated) DEVICE — SUT POLYSORB 2-0 18" V-20

## (undated) DEVICE — DRAPE HALF SHEET 40X57"

## (undated) DEVICE — SUT MONOSOF 3-0 18" P-12

## (undated) DEVICE — DRAPE LEGGINGS 6" CUFF 28X43"

## (undated) DEVICE — DRAPE TOWEL BLUE 17" X 24"

## (undated) DEVICE — FOR-ESU VALLEYLAB T7E15009DX: Type: DURABLE MEDICAL EQUIPMENT

## (undated) DEVICE — DRSG KLING 6"

## (undated) DEVICE — BLANKET WARMER FULL ADULT

## (undated) DEVICE — CLIP CLSD TB GAMMA III

## (undated) DEVICE — Device

## (undated) DEVICE — PREP BETADINE SPONGE STICKS

## (undated) DEVICE — DRILL BIT STRYKER ORTHO 4.2X80MM

## (undated) DEVICE — DRAPE C ARM MINI PACK FOR 6800

## (undated) DEVICE — WRAP COMPRESSION CALF MED

## (undated) DEVICE — DRSG COBAN 6"

## (undated) DEVICE — DRSG 4X4

## (undated) DEVICE — DRSG COMBINE 5X9"

## (undated) DEVICE — SUT POLYSORB 2-0 30" GS-10 UNDYED

## (undated) DEVICE — STAPLER SKIN VISI-STAT 35 WIDE

## (undated) DEVICE — TAPE SILK 3"

## (undated) DEVICE — DRSG STOCKINETTE TUBULAR COTTON 2PLY 4X36"

## (undated) DEVICE — FOR-TOURNIQUET 1130808363: Type: DURABLE MEDICAL EQUIPMENT

## (undated) DEVICE — GLV 8 PROTEXIS PI MICRO

## (undated) DEVICE — BLANKET WARMER UPPER ADULT

## (undated) DEVICE — DRAPE HAND TABLE EXTENSION

## (undated) DEVICE — PACK MINOR NO DRAPE

## (undated) DEVICE — REAMER STRYKER ORTHO SHAFT BIXCUT MOD 450MM